# Patient Record
Sex: FEMALE | Race: WHITE | NOT HISPANIC OR LATINO | Employment: OTHER | URBAN - METROPOLITAN AREA
[De-identification: names, ages, dates, MRNs, and addresses within clinical notes are randomized per-mention and may not be internally consistent; named-entity substitution may affect disease eponyms.]

---

## 2018-03-30 ENCOUNTER — APPOINTMENT (OUTPATIENT)
Dept: CT IMAGING | Facility: HOSPITAL | Age: 63
End: 2018-03-30

## 2018-03-30 ENCOUNTER — HOSPITAL ENCOUNTER (EMERGENCY)
Facility: HOSPITAL | Age: 63
Discharge: HOME | End: 2018-03-30
Attending: EMERGENCY MEDICINE | Admitting: EMERGENCY MEDICINE

## 2018-03-30 ENCOUNTER — APPOINTMENT (OUTPATIENT)
Dept: ULTRASOUND IMAGING | Facility: HOSPITAL | Age: 63
End: 2018-03-30

## 2018-03-30 VITALS
TEMPERATURE: 98.6 F | RESPIRATION RATE: 18 BRPM | DIASTOLIC BLOOD PRESSURE: 78 MMHG | BODY MASS INDEX: 16.22 KG/M2 | WEIGHT: 95 LBS | HEIGHT: 64 IN | HEART RATE: 91 BPM | OXYGEN SATURATION: 97 % | SYSTOLIC BLOOD PRESSURE: 143 MMHG

## 2018-03-30 DIAGNOSIS — R10.11 RIGHT UPPER QUADRANT ABDOMINAL PAIN: Primary | ICD-10-CM

## 2018-03-30 DIAGNOSIS — E27.8 ADRENAL MASS, LEFT (HCC): ICD-10-CM

## 2018-03-30 LAB
ALBUMIN SERPL-MCNC: 3.9 G/DL (ref 3.5–5.2)
ALBUMIN/GLOB SERPL: 1.2 G/DL
ALP SERPL-CCNC: 72 U/L (ref 39–117)
ALT SERPL W P-5'-P-CCNC: 13 U/L (ref 1–33)
ANION GAP SERPL CALCULATED.3IONS-SCNC: 10 MMOL/L
AST SERPL-CCNC: 22 U/L (ref 1–32)
BASOPHILS # BLD AUTO: 0.02 10*3/MM3 (ref 0–0.2)
BASOPHILS NFR BLD AUTO: 0.3 % (ref 0–1.5)
BILIRUB SERPL-MCNC: 0.2 MG/DL (ref 0.1–1.2)
BILIRUB UR QL STRIP: NEGATIVE
BUN BLD-MCNC: 15 MG/DL (ref 8–23)
BUN/CREAT SERPL: 30 (ref 7–25)
CALCIUM SPEC-SCNC: 9.9 MG/DL (ref 8.6–10.5)
CHLORIDE SERPL-SCNC: 98 MMOL/L (ref 98–107)
CLARITY UR: CLEAR
CO2 SERPL-SCNC: 29 MMOL/L (ref 22–29)
COLOR UR: YELLOW
CREAT BLD-MCNC: 0.5 MG/DL (ref 0.57–1)
DEPRECATED RDW RBC AUTO: 46.6 FL (ref 37–54)
EOSINOPHIL # BLD AUTO: 0.03 10*3/MM3 (ref 0–0.7)
EOSINOPHIL NFR BLD AUTO: 0.4 % (ref 0.3–6.2)
ERYTHROCYTE [DISTWIDTH] IN BLOOD BY AUTOMATED COUNT: 12.8 % (ref 11.7–13)
GFR SERPL CREATININE-BSD FRML MDRD: 124 ML/MIN/1.73
GLOBULIN UR ELPH-MCNC: 3.3 GM/DL
GLUCOSE BLD-MCNC: 100 MG/DL (ref 65–99)
GLUCOSE UR STRIP-MCNC: NEGATIVE MG/DL
HCT VFR BLD AUTO: 42.6 % (ref 35.6–45.5)
HGB BLD-MCNC: 14 G/DL (ref 11.9–15.5)
HGB UR QL STRIP.AUTO: NEGATIVE
HOLD SPECIMEN: NORMAL
HOLD SPECIMEN: NORMAL
IMM GRANULOCYTES # BLD: 0 10*3/MM3 (ref 0–0.03)
IMM GRANULOCYTES NFR BLD: 0 % (ref 0–0.5)
KETONES UR QL STRIP: ABNORMAL
LEUKOCYTE ESTERASE UR QL STRIP.AUTO: NEGATIVE
LIPASE SERPL-CCNC: 23 U/L (ref 13–60)
LYMPHOCYTES # BLD AUTO: 1.33 10*3/MM3 (ref 0.9–4.8)
LYMPHOCYTES NFR BLD AUTO: 17 % (ref 19.6–45.3)
MCH RBC QN AUTO: 32.8 PG (ref 26.9–32)
MCHC RBC AUTO-ENTMCNC: 32.9 G/DL (ref 32.4–36.3)
MCV RBC AUTO: 99.8 FL (ref 80.5–98.2)
MONOCYTES # BLD AUTO: 0.93 10*3/MM3 (ref 0.2–1.2)
MONOCYTES NFR BLD AUTO: 11.9 % (ref 5–12)
NEUTROPHILS # BLD AUTO: 5.53 10*3/MM3 (ref 1.9–8.1)
NEUTROPHILS NFR BLD AUTO: 70.4 % (ref 42.7–76)
NITRITE UR QL STRIP: NEGATIVE
PH UR STRIP.AUTO: 6 [PH] (ref 5–8)
PLATELET # BLD AUTO: 310 10*3/MM3 (ref 140–500)
PMV BLD AUTO: 10.8 FL (ref 6–12)
POTASSIUM BLD-SCNC: 3.9 MMOL/L (ref 3.5–5.2)
PROT SERPL-MCNC: 7.2 G/DL (ref 6–8.5)
PROT UR QL STRIP: NEGATIVE
RBC # BLD AUTO: 4.27 10*6/MM3 (ref 3.9–5.2)
SODIUM BLD-SCNC: 137 MMOL/L (ref 136–145)
SP GR UR STRIP: 1.02 (ref 1–1.03)
UROBILINOGEN UR QL STRIP: ABNORMAL
WBC NRBC COR # BLD: 7.84 10*3/MM3 (ref 4.5–10.7)
WHOLE BLOOD HOLD SPECIMEN: NORMAL
WHOLE BLOOD HOLD SPECIMEN: NORMAL

## 2018-03-30 PROCEDURE — 36415 COLL VENOUS BLD VENIPUNCTURE: CPT

## 2018-03-30 PROCEDURE — 80053 COMPREHEN METABOLIC PANEL: CPT

## 2018-03-30 PROCEDURE — 81003 URINALYSIS AUTO W/O SCOPE: CPT | Performed by: EMERGENCY MEDICINE

## 2018-03-30 PROCEDURE — 85025 COMPLETE CBC W/AUTO DIFF WBC: CPT

## 2018-03-30 PROCEDURE — 76705 ECHO EXAM OF ABDOMEN: CPT

## 2018-03-30 PROCEDURE — 74176 CT ABD & PELVIS W/O CONTRAST: CPT

## 2018-03-30 PROCEDURE — 99283 EMERGENCY DEPT VISIT LOW MDM: CPT

## 2018-03-30 PROCEDURE — 83690 ASSAY OF LIPASE: CPT

## 2018-03-30 RX ORDER — SODIUM CHLORIDE 0.9 % (FLUSH) 0.9 %
10 SYRINGE (ML) INJECTION AS NEEDED
Status: DISCONTINUED | OUTPATIENT
Start: 2018-03-30 | End: 2018-03-30 | Stop reason: HOSPADM

## 2018-03-30 RX ORDER — OMEPRAZOLE 40 MG/1
40 CAPSULE, DELAYED RELEASE ORAL 2 TIMES DAILY
Qty: 60 CAPSULE | Refills: 0 | Status: SHIPPED | OUTPATIENT
Start: 2018-03-30 | End: 2018-04-23 | Stop reason: SINTOL

## 2018-04-05 ENCOUNTER — TELEPHONE (OUTPATIENT)
Dept: FAMILY MEDICINE CLINIC | Facility: CLINIC | Age: 63
End: 2018-04-05

## 2018-04-05 DIAGNOSIS — E27.8 ADRENAL MASS, LEFT (HCC): Primary | ICD-10-CM

## 2018-04-05 DIAGNOSIS — Z12.39 SCREENING FOR BREAST CANCER: ICD-10-CM

## 2018-04-05 NOTE — TELEPHONE ENCOUNTER
Patient called and spoke with the  wanting someone to let her know the results of her CT scan.  Patient's call returned and let her know there was a mass on her adrenal gland that we would like to refer her to Endo for. She understands and accepts.

## 2018-04-13 ENCOUNTER — TELEPHONE (OUTPATIENT)
Dept: FAMILY MEDICINE CLINIC | Facility: CLINIC | Age: 63
End: 2018-04-13

## 2018-04-13 NOTE — TELEPHONE ENCOUNTER
"Patient left  asking for a call back because \"there is a test she thinks should be ordered from her ER visit last month.\"  "

## 2018-04-16 NOTE — TELEPHONE ENCOUNTER
Called and spoke with patient to let her know we cannot order any testing until we see her in office. If the pain gets worse she needs to go back to the ER.

## 2018-04-16 NOTE — TELEPHONE ENCOUNTER
Please call pt back and let her know that we can discuss her abdominal pain further on May 1st at her appointment.  If the pain is severe or if she cannot PO intake, then she should return to the ER.

## 2018-04-16 NOTE — TELEPHONE ENCOUNTER
Patient left a second VM this morning stating that she is still having gallbladder pain and would like the further imaging suggested from the ultrasound of her gallbladder from her ER visit.     No evidence for acute cholecystitis. With persistent clinical indication, hepatobiliary scintigraphy and/or CT scan could be considered for further evaluation.

## 2018-04-19 ENCOUNTER — HOSPITAL ENCOUNTER (OUTPATIENT)
Dept: MAMMOGRAPHY | Facility: HOSPITAL | Age: 63
Discharge: HOME | End: 2018-04-19
Admitting: FAMILY MEDICINE

## 2018-04-19 DIAGNOSIS — Z12.39 SCREENING FOR BREAST CANCER: ICD-10-CM

## 2018-04-19 PROCEDURE — 77067 SCR MAMMO BI INCL CAD: CPT

## 2018-04-23 ENCOUNTER — OFFICE VISIT (OUTPATIENT)
Dept: FAMILY MEDICINE CLINIC | Facility: CLINIC | Age: 63
End: 2018-04-23

## 2018-04-23 VITALS
RESPIRATION RATE: 13 BRPM | WEIGHT: 89 LBS | HEIGHT: 64 IN | HEART RATE: 77 BPM | SYSTOLIC BLOOD PRESSURE: 124 MMHG | BODY MASS INDEX: 15.19 KG/M2 | DIASTOLIC BLOOD PRESSURE: 86 MMHG | OXYGEN SATURATION: 98 %

## 2018-04-23 DIAGNOSIS — N85.8 UTERINE MASS: ICD-10-CM

## 2018-04-23 DIAGNOSIS — R10.11 RUQ PAIN: ICD-10-CM

## 2018-04-23 DIAGNOSIS — N28.1 RENAL CYST: ICD-10-CM

## 2018-04-23 DIAGNOSIS — R53.83 FATIGUE, UNSPECIFIED TYPE: ICD-10-CM

## 2018-04-23 DIAGNOSIS — R59.0 LEFT CERVICAL LYMPHADENOPATHY: Primary | ICD-10-CM

## 2018-04-23 DIAGNOSIS — R63.4 WEIGHT LOSS: ICD-10-CM

## 2018-04-23 PROBLEM — L71.9 ROSACEA: Status: ACTIVE | Noted: 2018-04-23

## 2018-04-23 PROBLEM — I34.1 PROLAPSE OF MITRAL VALVE: Status: ACTIVE | Noted: 2018-04-23

## 2018-04-23 PROCEDURE — 99215 OFFICE O/P EST HI 40 MIN: CPT | Performed by: FAMILY MEDICINE

## 2018-04-23 RX ORDER — DOXYCYCLINE HYCLATE 100 MG/1
100 CAPSULE ORAL AS NEEDED
COMMUNITY
End: 2018-05-17

## 2018-04-23 NOTE — PROGRESS NOTES
Subjective   Sujata Waters is a 62 y.o. female.     Chief Complaint   Patient presents with   • Establish Care   • Adenopathy   • Weight Loss   • Abdominal/flank pain   • ER follow up       HPI     ER follow up:  Pt went to [facility] ER on 3/30/18 for R flank pain  -ER labs reviewed and essentially WNL, notable only for: mild macrocytosis w/o anemia, BUN/Cr ratio of 30, serum glucose 100, and ketones in urine  -CT abdomen and pelvis dated 3/30/18 revealed:   1. 21 mm left adrenal mass with average density measurement of 40,  indeterminate.  2. 1.5 mm nonobstructing right posterior middle pole kidney stone  appears be associated with a low attenuating right renal mass 12 mm  question benign cyst, no obstruction.  3. Left kidney appears normal, no obstruction nor calcification.  4. Significant vascular calcification without aneurysm. There are 5  normal bowel gas gas pattern no obstruction, free air nor dilatation of  loops.  6. Apparent exophytic mass off the uterus with coarse calcifications  likely fibroid measuring up to 36 mm, no free fluid  -she was discharged home with omeprazole but she did not tolerate it due to palpitations   -of note pt has a hx of  mitral valve prolapse diagnosed in the 1980s and she does not follow regularly with a Cardiologist any more  -palpitations resolved with discontinuation of PPI  -today she is still having intermittent RUQ and R flank pain radiating to the R upper back  -no vomiting  -no apparent food triggers  -bowel movements are normal  -she has lost about 6 lb  -baseline weight 100-105 lb, but she is down to 89 lb today  -diet consists of a lot of fish and rice  -she has been trying to eat high-calorie nutritious snacks like dried fruit and peanut butter, but the weight loss continues  -on 4/5/18 I referred her to Endocrinology to follow up the adrenal mass discovered on CT scan and the appt is scheduled for  6/11/18    Swollen lymph node:  -in left side of  neck  -painless  -present x at least 2 weeks   -she does not think it has grown  -her dentist noticed the enlarged node and advised her to seek care last week  -pt reports a hx of basal cell carcinoma s/p resection but no other cancers    It has been several years since she last saw her previous PCP Dr. Karyn Snow      Review of Systems   Constitutional: Positive for fatigue and unexpected weight change (losing weight despite eating normally). Negative for activity change, appetite change, diaphoresis (no night sweats) and fever.   HENT: Negative for congestion, ear pain, rhinorrhea, sinus pain, sinus pressure and sore throat.    Respiratory: Negative for cough and shortness of breath.    Cardiovascular: Positive for palpitations (intermittently for years). Negative for chest pain.   Gastrointestinal: Positive for abdominal pain and nausea. Negative for abdominal distention, blood in stool, constipation, diarrhea and vomiting.   Genitourinary: Negative for dysuria and hematuria.   Skin: Negative for rash and wound.   Neurological: Negative for dizziness and headaches.   Hematological: Positive for adenopathy. Does not bruise/bleed easily.       The following portions of the patient's history were reviewed and updated as appropriate: allergies, current medications, past family history, past medical history, past social history, past surgical history and problem list.    Past Medical History:   Diagnosis Date   • Atherosclerosis of abdominal aorta    • Basal cell carcinoma     Follows up with Dermatology annually, PA with Dr. Antoine's office   • Bowen's disease of vulva    • Left adrenal mass    • Mitral valve prolapse    • Right renal mass    • Rosacea    • Uterine mass      Past Surgical History:   Procedure Laterality Date   • BREAST AUGMENTATION     • EYE SURGERY     • RETINAL LASER PROCEDURE     • VULVECTOMY COMPLETE / RADICAL / PARTIAL      partial, due to Bowen's Disease       Family History   Problem Relation  Age of Onset   • Heart disease Father    • Thyroid cancer Sister    • No Known Problems Brother      Social History     Social History   • Marital status:      Social History Main Topics   • Smoking status: Current Every Day Smoker     Packs/day: 1.50     Types: Cigarettes   • Smokeless tobacco: Never Used      Comment: started smoking at age 16   • Alcohol use Yes      Comment: 2 glasses of wine a few nights per week   • Drug use: No     Social History Narrative    Works as a .  Lives at home with her daughter.          No Known Allergies     Outpatient Medications Prior to Visit   Medication Sig Dispense Refill   • Loratadine (CLARITIN CHILDRENS PO) Take  by mouth.           Objective     Vitals:    04/23/18 1404   BP: 124/86   Pulse: 77   Resp: 13   SpO2: 98%   BMI 15.3    Physical Exam   Constitutional: No distress.   Cachetic appearing adult woman in no acute distress   HENT:   Head: Normocephalic and atraumatic.   Nose: Nose normal.   Mouth/Throat: Oropharynx is clear and moist.   Eyes: Conjunctivae are normal. Pupils are equal, round, and reactive to light.   Neck: No thyromegaly present.   Cardiovascular: Normal rate, regular rhythm and normal heart sounds.  Exam reveals no gallop and no friction rub.    No murmur heard.  Pulmonary/Chest: Effort normal and breath sounds normal. No respiratory distress. She has no wheezes. She has no rhonchi. She has no rales.   Abdominal: Soft. Bowel sounds are normal. She exhibits no distension and no mass. There is no tenderness. There is no rebound and no guarding.   Lymphadenopathy:     She has cervical adenopathy (enlarged, firm, fixed post-auricular lymph node on the L side).   Skin: Skin is warm and dry.   Psychiatric: She has a normal mood and affect. Her behavior is normal.       ASSESSMENT/PLAN          Visit Diagnoses     Left cervical lymphadenopathy    physical exam concerning for possible malignancy    Relevant Orders    Urgent Ambulatory  Referral to ENT (Otolaryngology) for biopsy    CBC & Differential (Completed)    Comprehensive Metabolic Panel (Completed)    TSH (Completed)    Weight loss   ddx is broad and includes malignancy (particulalry lymphoma given enlarged lymph node), gallbladder disease, thyroid disease, and anxiety    Relevant Orders    Ambulatory Referral to ENT (Otolaryngology)    CBC & Differential (Completed)    Comprehensive Metabolic Panel (Completed)    TSH (Completed)    US Abdomen Complete (Completed)  Pt given sample bottles of Ensure nutritional supplement to increase caloric intake.        RUQ pain        Relevant Orders    Comprehensive Metabolic Panel (Completed)    US Abdomen Complete (Completed)  Consider HIDA scan and/or referral to GI pending results        Fatigue, unspecified type        Relevant Orders    Ambulatory Referral to ENT (Otolaryngology)    CBC & Differential (Completed)    Comprehensive Metabolic Panel (Completed)    TSH (Completed)    Renal cyst        Relevant Orders    US Abdomen Complete (Completed)  Consider referral to Urology pending results      Uterine mass   Consistent with fibroid on initial CT scan, pt denies pelvic complaints at this time     Relevant Orders    US Abdomen Complete (Completed)  Consider referral to Gynecology pending results          Available records from pt's OB/GYN and previous PCP reviewed via Care Everywhere and chart updated    Patient Instructions   Please try to drink 2 Ensure drinks daily in addition to your usual 3 meals per day.      High-Protein and High-Calorie Diet  Eating high-protein and high-calorie foods can help you to gain weight, heal after an injury, and recover after an illness or surgery.  What is my plan?  The specific amount of daily protein and calories you need depends on:  · Your body weight.  · The reason this diet is recommended for you.  Generally, a high-protein, high-calorie diet involves:  · Eating 250-500 extra calories each day.  · Making  sure that 10-35% of your daily calories come from protein.  Talk to your health care provider about how much protein and how many calories you need each day. Follow the diet as directed by your health care provider.  What do I need to know about this diet?  · Ask your health care provider if you should take a nutritional supplement.  · Try to eat six small meals each day instead of three large meals.  · Eat a balanced diet, including one food that is high in protein at each meal.  · Keep nutritious snacks handy, such as nuts, trail mixes, dried fruit, and yogurt.  · If you have kidney disease or diabetes, eating too much protein may put extra stress on your kidneys. Talk to your health care provider if you have either of those conditions.  What are some high-protein foods?  Grains   Quinoa. Bulgur wheat.  Vegetables   Soybeans. Peas.  Meats and Other Protein Sources   Beef, pork, and poultry. Fish and seafood. Eggs. Tofu. Textured vegetable protein (TVP). Peanut butter. Nuts and seeds. Dried beans. Protein powders.  Dairy   Whole milk. Whole-milk yogurt. Powdered milk. Cheese. Cottage Cheese. Eggnog.  Beverages   High-protein supplement drinks. Soy milk.  Other   Protein bars.  The items listed above may not be a complete list of recommended foods or beverages. Contact your dietitian for more options.   What are some high-calorie foods?  Grains   Pasta. Quick breads. Muffins. Pancakes. Ready-to-eat cereal.  Vegetables   Vegetables cooked in oil or butter. Fried potatoes.  Fruits   Dried fruit. Fruit leather. Canned fruit in syrup. Fruit juice. Avocados.  Meats and Other Protein Sources   Peanut butter. Nuts and seeds.  Dairy   Heavy cream. Whipped cream. Cream cheese. Sour cream. Ice cream. Custard. Pudding.  Beverages   Meal-replacement beverages. Nutrition shakes. Fruit juice. Sugar-sweetened soft drinks.  Condiments   Salad dressing. Mayonnaise. Ramesh sauce. Fruit preserves or jelly. Honey.  Syrup.  Sweets/Desserts   Cake. Cookies. Pie. Pastries. Candy bars. Chocolate.  Fats and Oils   Butter or margarine. Oil. Gravy.  Other   Meal-replacement bars.  The items listed above may not be a complete list of recommended foods or beverages. Contact your dietitian for more options.   What are some tips for including high-protein and high-calorie foods in my diet?  · Add whole milk, half-and-half, or heavy cream to cereal, pudding, soup, or hot cocoa.  · Add whole milk to instant breakfast drinks.  · Add peanut butter to oatmeal or smoothies.  · Add powdered milk to baked goods, smoothies, or milkshakes.  · Add powdered milk, cream, or butter to mashed potatoes.  · Add cheese to cooked vegetables.  · Make whole-milk yogurt parfaits. Top them with granola, fruit, or nuts.  · Add cottage cheese to your fruit.  · Add avocados, cheese, or both to sandwiches or salads.  · Add meat, poultry, or seafood to rice, pasta, casseroles, salads, and soups.  · Use mayonnaise when making egg salad, chicken salad, or tuna salad.  · Use peanut butter as a topping for pretzels, celery, or crackers.  · Add beans to casseroles, dips, and spreads.  · Add pureed beans to sauces and soups.  · Replace calorie-free drinks with calorie-containing drinks, such as milk and fruit juice.  This information is not intended to replace advice given to you by your health care provider. Make sure you discuss any questions you have with your health care provider.  Document Released: 12/18/2006 Document Revised: 05/25/2017 Document Reviewed: 06/02/2015  [employer] Interactive Patient Education © 2017 [employer]      Return in about 1 week (around 4/30/2018). for close follow up      Bernie Francois MD  04/24/18

## 2018-04-23 NOTE — PATIENT INSTRUCTIONS
Please try to drink 2 Ensure drinks daily in addition to your usual 3 meals per day.      High-Protein and High-Calorie Diet  Eating high-protein and high-calorie foods can help you to gain weight, heal after an injury, and recover after an illness or surgery.  What is my plan?  The specific amount of daily protein and calories you need depends on:  · Your body weight.  · The reason this diet is recommended for you.  Generally, a high-protein, high-calorie diet involves:  · Eating 250-500 extra calories each day.  · Making sure that 10-35% of your daily calories come from protein.  Talk to your health care provider about how much protein and how many calories you need each day. Follow the diet as directed by your health care provider.  What do I need to know about this diet?  · Ask your health care provider if you should take a nutritional supplement.  · Try to eat six small meals each day instead of three large meals.  · Eat a balanced diet, including one food that is high in protein at each meal.  · Keep nutritious snacks handy, such as nuts, trail mixes, dried fruit, and yogurt.  · If you have kidney disease or diabetes, eating too much protein may put extra stress on your kidneys. Talk to your health care provider if you have either of those conditions.  What are some high-protein foods?  Grains   Quinoa. Bulgur wheat.  Vegetables   Soybeans. Peas.  Meats and Other Protein Sources   Beef, pork, and poultry. Fish and seafood. Eggs. Tofu. Textured vegetable protein (TVP). Peanut butter. Nuts and seeds. Dried beans. Protein powders.  Dairy   Whole milk. Whole-milk yogurt. Powdered milk. Cheese. Cottage Cheese. Eggnog.  Beverages   High-protein supplement drinks. Soy milk.  Other   Protein bars.  The items listed above may not be a complete list of recommended foods or beverages. Contact your dietitian for more options.   What are some high-calorie foods?  Grains   Pasta. Quick breads. Muffins. Pancakes.  Ready-to-eat cereal.  Vegetables   Vegetables cooked in oil or butter. Fried potatoes.  Fruits   Dried fruit. Fruit leather. Canned fruit in syrup. Fruit juice. Avocados.  Meats and Other Protein Sources   Peanut butter. Nuts and seeds.  Dairy   Heavy cream. Whipped cream. Cream cheese. Sour cream. Ice cream. Custard. Pudding.  Beverages   Meal-replacement beverages. Nutrition shakes. Fruit juice. Sugar-sweetened soft drinks.  Condiments   Salad dressing. Mayonnaise. Ramesh sauce. Fruit preserves or jelly. Honey. Syrup.  Sweets/Desserts   Cake. Cookies. Pie. Pastries. Candy bars. Chocolate.  Fats and Oils   Butter or margarine. Oil. Gravy.  Other   Meal-replacement bars.  The items listed above may not be a complete list of recommended foods or beverages. Contact your dietitian for more options.   What are some tips for including high-protein and high-calorie foods in my diet?  · Add whole milk, half-and-half, or heavy cream to cereal, pudding, soup, or hot cocoa.  · Add whole milk to instant breakfast drinks.  · Add peanut butter to oatmeal or smoothies.  · Add powdered milk to baked goods, smoothies, or milkshakes.  · Add powdered milk, cream, or butter to mashed potatoes.  · Add cheese to cooked vegetables.  · Make whole-milk yogurt parfaits. Top them with granola, fruit, or nuts.  · Add cottage cheese to your fruit.  · Add avocados, cheese, or both to sandwiches or salads.  · Add meat, poultry, or seafood to rice, pasta, casseroles, salads, and soups.  · Use mayonnaise when making egg salad, chicken salad, or tuna salad.  · Use peanut butter as a topping for pretzels, celery, or crackers.  · Add beans to casseroles, dips, and spreads.  · Add pureed beans to sauces and soups.  · Replace calorie-free drinks with calorie-containing drinks, such as milk and fruit juice.  This information is not intended to replace advice given to you by your health care provider. Make sure you discuss any questions you have with  your health care provider.  Document Released: 12/18/2006 Document Revised: 05/25/2017 Document Reviewed: 06/02/2015  [employer] Interactive Patient Education © 2017 [employer]

## 2018-04-24 ENCOUNTER — HOSPITAL ENCOUNTER (OUTPATIENT)
Dept: ULTRASOUND IMAGING | Facility: HOSPITAL | Age: 63
Discharge: HOME | End: 2018-04-24
Admitting: FAMILY MEDICINE

## 2018-04-24 DIAGNOSIS — N28.1 RENAL CYST: ICD-10-CM

## 2018-04-24 DIAGNOSIS — R10.11 RUQ PAIN: ICD-10-CM

## 2018-04-24 DIAGNOSIS — R63.4 WEIGHT LOSS: ICD-10-CM

## 2018-04-24 DIAGNOSIS — N85.8 UTERINE MASS: ICD-10-CM

## 2018-04-24 PROBLEM — R59.0 LEFT CERVICAL LYMPHADENOPATHY: Status: ACTIVE | Noted: 2018-04-24

## 2018-04-24 LAB
ALBUMIN SERPL-MCNC: 4.3 G/DL (ref 3.6–4.8)
ALBUMIN/GLOB SERPL: 1.4 {RATIO} (ref 1.2–2.2)
ALP SERPL-CCNC: 78 IU/L (ref 39–117)
ALT SERPL-CCNC: 13 IU/L (ref 0–32)
AST SERPL-CCNC: 28 IU/L (ref 0–40)
BASOPHILS # BLD AUTO: 0 X10E3/UL (ref 0–0.2)
BASOPHILS NFR BLD AUTO: 0 %
BILIRUB SERPL-MCNC: 0.4 MG/DL (ref 0–1.2)
BUN SERPL-MCNC: 9 MG/DL (ref 8–27)
BUN/CREAT SERPL: 19 (ref 12–28)
CALCIUM SERPL-MCNC: 9.7 MG/DL (ref 8.7–10.3)
CHLORIDE SERPL-SCNC: 93 MMOL/L (ref 96–106)
CO2 SERPL-SCNC: 24 MMOL/L (ref 18–29)
CREAT SERPL-MCNC: 0.47 MG/DL (ref 0.57–1)
EOSINOPHIL # BLD AUTO: 0 X10E3/UL (ref 0–0.4)
EOSINOPHIL NFR BLD AUTO: 0 %
ERYTHROCYTE [DISTWIDTH] IN BLOOD BY AUTOMATED COUNT: 13.6 % (ref 12.3–15.4)
GFR SERPLBLD CREATININE-BSD FMLA CKD-EPI: 105 ML/MIN/1.73
GFR SERPLBLD CREATININE-BSD FMLA CKD-EPI: 121 ML/MIN/1.73
GLOBULIN SER CALC-MCNC: 3 G/DL (ref 1.5–4.5)
GLUCOSE SERPL-MCNC: 94 MG/DL (ref 65–99)
HCT VFR BLD AUTO: 42.8 % (ref 34–46.6)
HGB BLD-MCNC: 14.7 G/DL (ref 11.1–15.9)
IMM GRANULOCYTES # BLD: 0 X10E3/UL (ref 0–0.1)
IMM GRANULOCYTES NFR BLD: 0 %
LYMPHOCYTES # BLD AUTO: 1.3 X10E3/UL (ref 0.7–3.1)
LYMPHOCYTES NFR BLD AUTO: 18 %
MCH RBC QN AUTO: 33 PG (ref 26.6–33)
MCHC RBC AUTO-ENTMCNC: 34.3 G/DL (ref 31.5–35.7)
MCV RBC AUTO: 96 FL (ref 79–97)
MONOCYTES # BLD AUTO: 1 X10E3/UL (ref 0.1–0.9)
MONOCYTES NFR BLD AUTO: 13 %
NEUTROPHILS # BLD AUTO: 5 X10E3/UL (ref 1.4–7)
NEUTROPHILS NFR BLD AUTO: 69 %
PLATELET # BLD AUTO: 311 X10E3/UL (ref 150–379)
POTASSIUM SERPL-SCNC: 4.5 MMOL/L (ref 3.5–5.2)
PROT SERPL-MCNC: 7.3 G/DL (ref 6–8.5)
RBC # BLD AUTO: 4.45 X10E6/UL (ref 3.77–5.28)
SODIUM SERPL-SCNC: 135 MMOL/L (ref 134–144)
TSH SERPL DL<=0.005 MIU/L-ACNC: 1.02 UIU/ML (ref 0.45–4.5)
WBC # BLD AUTO: 7.3 X10E3/UL (ref 3.4–10.8)

## 2018-04-24 PROCEDURE — 76700 US EXAM ABDOM COMPLETE: CPT

## 2018-04-26 DIAGNOSIS — Z72.0 TOBACCO ABUSE: ICD-10-CM

## 2018-04-26 DIAGNOSIS — R59.1 LYMPHADENOPATHY: ICD-10-CM

## 2018-04-26 DIAGNOSIS — R63.4 WEIGHT LOSS: Primary | ICD-10-CM

## 2018-04-26 NOTE — PROGRESS NOTES
Patient called and informed of indeterminate right renal and left adrenal lesions essentially unchanged compared to previous CT abdomen and pelvis.  Will defer follow-up CT abdomen renal protocol until after lymph node biopsy and CT chest as there is high concern for possible malignancy and these studies take precedence at this time.  Stat order for CT chest with contrast placed.  Lymph node biopsy scheduled in 2 weeks time on May 10, 2018.  Patient reports that the physician's assistant at the ENT office whom she saw today was able to perform flexible laryngoscopy in the office which was normal.

## 2018-05-01 ENCOUNTER — HOSPITAL ENCOUNTER (OUTPATIENT)
Dept: CT IMAGING | Facility: HOSPITAL | Age: 63
Discharge: HOME | End: 2018-05-01
Admitting: FAMILY MEDICINE

## 2018-05-01 DIAGNOSIS — R63.4 WEIGHT LOSS: ICD-10-CM

## 2018-05-01 DIAGNOSIS — R59.1 LYMPHADENOPATHY: ICD-10-CM

## 2018-05-01 DIAGNOSIS — Z72.0 TOBACCO ABUSE: ICD-10-CM

## 2018-05-01 LAB — CREAT BLDA-MCNC: 0.4 MG/DL (ref 0.6–1.3)

## 2018-05-01 PROCEDURE — 82565 ASSAY OF CREATININE: CPT

## 2018-05-01 PROCEDURE — 71260 CT THORAX DX C+: CPT

## 2018-05-01 PROCEDURE — 25010000002 IOPAMIDOL 61 % SOLUTION: Performed by: FAMILY MEDICINE

## 2018-05-01 RX ADMIN — IOPAMIDOL 75 ML: 612 INJECTION, SOLUTION INTRAVENOUS at 10:03

## 2018-05-02 DIAGNOSIS — R91.8 MASS OF RIGHT LUNG: Primary | ICD-10-CM

## 2018-05-09 ENCOUNTER — OFFICE VISIT (OUTPATIENT)
Dept: OTHER | Facility: HOSPITAL | Age: 63
End: 2018-05-09
Attending: INTERNAL MEDICINE

## 2018-05-09 VITALS
TEMPERATURE: 97.8 F | RESPIRATION RATE: 16 BRPM | HEIGHT: 64 IN | OXYGEN SATURATION: 96 % | SYSTOLIC BLOOD PRESSURE: 152 MMHG | DIASTOLIC BLOOD PRESSURE: 71 MMHG | WEIGHT: 87.6 LBS | HEART RATE: 73 BPM | BODY MASS INDEX: 14.95 KG/M2

## 2018-05-09 DIAGNOSIS — C79.9 METASTATIC CANCER (HCC): Primary | ICD-10-CM

## 2018-05-09 DIAGNOSIS — C34.90 MALIGNANT NEOPLASM OF LUNG, UNSPECIFIED LATERALITY, UNSPECIFIED PART OF LUNG (HCC): ICD-10-CM

## 2018-05-09 DIAGNOSIS — I34.1 PROLAPSE OF MITRAL VALVE: ICD-10-CM

## 2018-05-09 DIAGNOSIS — R59.0 ENLARGED LYMPH NODE IN NECK: ICD-10-CM

## 2018-05-09 DIAGNOSIS — B02.29 POSTHERPETIC NEURALGIA: ICD-10-CM

## 2018-05-09 PROCEDURE — 99205 OFFICE O/P NEW HI 60 MIN: CPT | Performed by: INTERNAL MEDICINE

## 2018-05-09 PROCEDURE — G0463 HOSPITAL OUTPT CLINIC VISIT: HCPCS | Performed by: INTERNAL MEDICINE

## 2018-05-09 RX ORDER — LORAZEPAM 1 MG/1
1 TABLET ORAL ONCE
Qty: 1 TABLET | Refills: 0 | Status: SHIPPED | OUTPATIENT
Start: 2018-05-09 | End: 2018-05-09

## 2018-05-09 NOTE — PROGRESS NOTES
Subjective     REASON FOR CONSULTATION:  Metastatic cancer likely lung primary  Provide an opinion on any further workup or treatment                             REQUESTING PHYSICIAN:  Dr. Kevin Heath    RECORDS OBTAINED:  Records of the patients history including those obtained from the referring provider were reviewed and summarized in detail.    HISTORY OF PRESENT ILLNESS:  The patient is a 62 y.o. year old female who is here for an opinion about the above issue.    History of Present Illness patient is a 62-year-old female who had gone to the emergency room at [facility] on March 30, 2018 with intermittent severe sharp right flank pain and right back pain which started a week prior.  She also states that it worsens in the night and it lasted 20-30 minutes and was intermittent.  She had pain after eating.  In the emergency room they did a CT of the abdomen pelvis which showed a 21 mm left adrenal mass.  A 1.5 cm nonobstructing right kidney stone.  Ultrasound of the gallbladder was done which showed no evidence of acute cholecystitis.    She also had a recent mammogram April 19, 2018 which was negative.  She came in with continued pain and went to the primary care physician.  She had a repeat abdominal ultrasound on April 23, 2018.  This showed that the liver and pancreas appeared normal the gallbladder appeared normal the spleen was normal.  She had calcifications in the [employer].  There is a 15 mm focus hypoechoic lesion in the right mid pole of the kidney.  Adjacent to the upper pole of the left kidney there is a solid appearing mass which is 28 mm.  This corresponds to the adrenal lesion which was seen on the CT scan previously.    Patient  had a CT scan of the chest with contrast on May 2, 2018.  There is a large heterogeneous mass within the posterior  segment of the right upper lobe which measures approximately 8.8 x 5.6  cm and the craniocaudad span is approximately 6.6 cm. There is extension  into the  posterior chest wall at the posterior right 4th-5th intercostal  space and there is extension into the right T4-5 neural foramen. There  are multiple nodules surrounding the lesion in the right upper lobe.  There is also an irregular 4 x 3 cm mass at the anterior aspect of the  left upper lobe. In addition, there is an 8 mm irregular opacity in the  left apical region and a 6 mm pulmonary nodule inferiorly in the left  upper lobe. There are 2 irregular reticular opacities in the right lower  lobe which both measure approximately 1 cm. There are no pleural or  pericardial effusions. There is ill-defined lymphadenopathy at the right  hilum. There are moderately extensive underlying emphysematous changes.  In the visualized upper abdomen, there is a 3.0 x 2.2 cm left adrenal  nodule. There is a faintly hyperenhancing 7 mm lesion within the  anterior hepatic segment.    Patient continues to have right upper back pain.  She denies any bladder or bowel incontinence.  She denies any headache.  She does not have a tissue diagnosis.  She has also seen that she developed a left neck node which has increased within the last month.  She was referred to ENT Dr. Forbes.  The patient had an endoscopy which apparently was negative.  The neck node is reasonably large about 3 into 4 cm.  She will require core needle biopsy of the neck node which is easy access.  She did lose a lot of weight.        Past Medical History:   Diagnosis Date   • Atherosclerosis of abdominal aorta    • Basal cell carcinoma     Follows up with Dermatology annually, [place] with Dr. Antoine's office   • Bowen's disease of vulva    • Left adrenal mass    • Lung mass    • Lung mass     Bilateral   • Mitral valve prolapse    • Right renal mass    • Rosacea    • Shingles    • Uterine mass         Past Surgical History:   Procedure Laterality Date   • BREAST AUGMENTATION     • EYE SURGERY     • RETINAL LASER PROCEDURE     • VULVECTOMY COMPLETE / RADICAL / PARTIAL       "partial, due to Bowen's Disease        Current Outpatient Prescriptions on File Prior to Visit   Medication Sig Dispense Refill   • doxycycline (VIBRAMYCIN) 100 MG capsule Take 100 mg by mouth.     • Loratadine (CLARITIN CHILDRENS PO) Take  by mouth.       No current facility-administered medications on file prior to visit.         ALLERGIES:  No Known Allergies     Social History     Social History   • Marital status:      Social History Main Topics   • Smoking status: Current Every Day Smoker     Packs/day: 1.50     Types: Cigarettes   • Smokeless tobacco: Never Used      Comment: started smoking at age 16   • Alcohol use Yes      Comment: 2 glasses of wine a few nights per week   • Drug use: No   • Sexual activity: Defer     Other Topics Concern   • Not on file     Social History Narrative    Works as a .  Lives at home with her daughter.          Family History   Problem Relation Age of Onset   • Heart disease Father    • Thyroid cancer Sister    • No Known Problems Brother         Review of Systems   Constitutional: Positive for fatigue and unexpected weight change.   Respiratory: Negative for cough, chest tightness and shortness of breath.    Cardiovascular: Positive for chest pain. Negative for palpitations and leg swelling.   Gastrointestinal: Negative for abdominal pain, blood in stool, nausea and vomiting.   All other systems reviewed and are negative.       Objective     Vitals:    05/09/18 1024   BP: 152/71   Pulse: 73   Resp: 16   Temp: 97.8 °F (36.6 °C)   TempSrc: Oral   SpO2: 96%  Comment: room air   Weight: 39.7 kg (87 lb 9.6 oz)   Height: 162.6 cm (64.02\")     No flowsheet data found.    Physical Exam      GENERAL:  Well-developed, well-nourished in no acute distress.   SKIN:  Warm, dry without rashes, purpura or petechiae.  EYES:  Pupils equal, round and reactive to light.  EOMs intact.  Conjunctivae normal.  EARS:  Hearing intact.  NOSE:  Septum midline.  No excoriations or " nasal discharge.  MOUTH:  Tongue is well-papillated; no stomatitis or ulcers.  Lips normal.  THROAT:  Oropharynx without lesions or exudates.  NECK:  Supple with good range of motion; no thyromegaly or masses, no JVD.  LYMPHATICS:  No cervical, supraclavicular, axillary or inguinal adenopathy.  CHEST:  Lungs clear to auscultation. Good airflow.  CARDIAC:  Regular rate and rhythm without murmurs, rubs or gallops. Normal S1,S2.  ABDOMEN:  Soft, nontender with no hepatosplenomegaly or masses.  EXTREMITIES:  No clubbing, cyanosis or edema.  NEUROLOGICAL:  Cranial Nerves II-XII grossly intact.  No focal neurological deficits.  PSYCHIATRIC:  Normal affect and mood.          RECENT LABS:  Hematology WBC   Date Value Ref Range Status   04/23/2018 7.3 3.4 - 10.8 x10E3/uL Final   03/30/2018 7.84 4.50 - 10.70 10*3/mm3 Final     RBC   Date Value Ref Range Status   04/23/2018 4.45 3.77 - 5.28 x10E6/uL Final   03/30/2018 4.27 3.90 - 5.20 10*6/mm3 Final     Hemoglobin   Date Value Ref Range Status   04/23/2018 14.7 11.1 - 15.9 g/dL Final   03/30/2018 14.0 11.9 - 15.5 g/dL Final     Hematocrit   Date Value Ref Range Status   04/23/2018 42.8 34.0 - 46.6 % Final   03/30/2018 42.6 35.6 - 45.5 % Final     Platelets   Date Value Ref Range Status   04/23/2018 311 150 - 379 x10E3/uL Final   03/30/2018 310 140 - 500 10*3/mm3 Final          Assessment/Plan     1.  Newly diagnosed bilateral lung nodules, with a large 8 cm ×6 cm right upper lobe lung nodule with extension into the intercostal space between the fourth and fifth rib and also extending into the thoracic 4-5 neural foramina.  Patient has bilateral pulmonary nodular opacities which are suspicious for metastasis.  Patient also has a 3 cm left adrenal nodule suspicious for metastasis.  Further evaluation with a PET CT is recommended.  Patient has noticed a left neck nodule and is a highly high nodule which is growing very quickly in the last month.  Patient has seen ENT and  endoscopy was negative.  She does have a significant amount of pain on the right chest wall area.  She does not have a tissue diagnosis and will give 1.  She has distant metastases to the adrenal gland.  I reviewed both the CT scan and the PET scan personally with the patient    2.  Pain with tumor extending near the thoracic 4 and 5 neural canal as well as the intercostal spaces.  Patient will require a PET scan.  He'll also require an MRI of the thoracic spine.    3.  Anxiety and depression    Plan 1.  MRI of the brain    2.  MRI of the thoracic spine    3.  Will receive Ativan 1 mg prior to the MRI    4.  Follow-up extended week    5.  Ultrasound-guided neck node biopsy, we will obtain a core biopsy and needs to be sent to pathology as this is likely metastatic lung cancer.    6.  Will obtain CD X testing on the tissue    Leyla Canada MD        Cc: Dr. Heath

## 2018-05-09 NOTE — PROGRESS NOTES
Subjective   Patient ID: Sujata Waters is a 62 y.o. female {Specialty - why patient here?:[id number]}    History of Present Illness  Dear Colleague,  Sujata Waters was seen in our office today for continued follow up and surveillance  postoperative visit after surgery for ***.  She denies any complaints of fever, chills, cough, hemoptysis, pleuritic chest pain, shortness of air, dyspnea with exertion, night sweats, hoarseness, or unintentional weight loss. Underlying medical conditions including *** remain stable.  She has no other somatic complaints or alleviating or exacerbating factors aside from those mentioned above.    The following portions of the patient's history were reviewed and updated as appropriate: allergies, current medications, past family history, past medical history, past social history, past surgical history and problem list.  ROS  Patient Active Problem List   Diagnosis   • AR (allergic rhinitis)   • Postherpetic neuralgia   • Prolapse of mitral valve   • Rosacea   • Smoking addiction   • Left adrenal mass   • Right renal mass   • Uterine mass   • Basal cell carcinoma   • Shingles   • Left cervical lymphadenopathy   • Weight loss   • RUQ pain   • Renal cyst   • Lung mass     Past Medical History:   Diagnosis Date   • Atherosclerosis of abdominal aorta    • Basal cell carcinoma     Follows up with Dermatology annually, [place] with Dr. Antoine's office   • Bowen's disease of vulva    • Left adrenal mass    • Lung mass    • Mitral valve prolapse    • Right renal mass    • Rosacea    • Shingles    • Uterine mass      Past Surgical History:   Procedure Laterality Date   • BREAST AUGMENTATION     • EYE SURGERY     • RETINAL LASER PROCEDURE     • VULVECTOMY COMPLETE / RADICAL / PARTIAL      partial, due to Bowen's Disease     Family History   Problem Relation Age of Onset   • Heart disease Father    • Thyroid cancer Sister    • No Known Problems Brother      Social History     Social History   • Marital  status:      Spouse name: N/A   • Number of children: N/A   • Years of education: N/A     Occupational History   • Not on file.     Social History Main Topics   • Smoking status: Current Every Day Smoker     Packs/day: 1.50     Types: Cigarettes   • Smokeless tobacco: Never Used      Comment: started smoking at age 16   • Alcohol use Yes      Comment: 2 glasses of wine a few nights per week   • Drug use: No   • Sexual activity: Defer     Other Topics Concern   • Not on file     Social History Narrative    Works as a .  Lives at home with her daughter.         Current Outpatient Prescriptions:   •  doxycycline (VIBRAMYCIN) 100 MG capsule, Take 100 mg by mouth., Disp: , Rfl:   •  Loratadine (CLARITIN CHILDRENS PO), Take  by mouth., Disp: , Rfl:   No Known Allergies     Objective   Vitals:    05/09/18 1024   BP: 152/71   Pulse: 73   Resp: 16   Temp: 97.8 °F (36.6 °C)   SpO2: 96%     Physical Exam  Independent Review of Radiographic Studies:    ***  Assessment/Plan   Assessment:      Plan:    There are no diagnoses linked to this encounter.

## 2018-05-10 ENCOUNTER — TELEPHONE (OUTPATIENT)
Dept: ONCOLOGY | Facility: CLINIC | Age: 63
End: 2018-05-10

## 2018-05-10 NOTE — TELEPHONE ENCOUNTER
----- Message from April June Marion RN sent at 5/10/2018 12:52 PM EDT -----  Traci,  Can you call me about this patient when you have a minute. Dr. BERNAL wanted there scheduled next week on 05/16 @0800. [phone]

## 2018-05-11 ENCOUNTER — HOSPITAL ENCOUNTER (OUTPATIENT)
Dept: MRI IMAGING | Facility: HOSPITAL | Age: 63
Discharge: HOME | End: 2018-05-11
Attending: INTERNAL MEDICINE

## 2018-05-11 ENCOUNTER — HOSPITAL ENCOUNTER (OUTPATIENT)
Dept: MRI IMAGING | Facility: HOSPITAL | Age: 63
Discharge: HOME | End: 2018-05-11
Attending: INTERNAL MEDICINE | Admitting: INTERNAL MEDICINE

## 2018-05-11 DIAGNOSIS — C79.9 METASTATIC CANCER (HCC): ICD-10-CM

## 2018-05-11 PROCEDURE — 70553 MRI BRAIN STEM W/O & W/DYE: CPT

## 2018-05-11 PROCEDURE — 0 GADOBENATE DIMEGLUMINE 529 MG/ML SOLUTION: Performed by: INTERNAL MEDICINE

## 2018-05-11 PROCEDURE — A9577 INJ MULTIHANCE: HCPCS | Performed by: INTERNAL MEDICINE

## 2018-05-11 PROCEDURE — 72157 MRI CHEST SPINE W/O & W/DYE: CPT

## 2018-05-11 RX ADMIN — GADOBENATE DIMEGLUMINE 8 ML: 529 INJECTION, SOLUTION INTRAVENOUS at 07:54

## 2018-05-14 ENCOUNTER — HOSPITAL ENCOUNTER (OUTPATIENT)
Dept: CT IMAGING | Facility: HOSPITAL | Age: 63
Discharge: HOME | End: 2018-05-14
Attending: INTERNAL MEDICINE | Admitting: INTERNAL MEDICINE

## 2018-05-14 ENCOUNTER — HOSPITAL ENCOUNTER (OUTPATIENT)
Dept: ULTRASOUND IMAGING | Facility: HOSPITAL | Age: 63
Discharge: HOME | End: 2018-05-14
Attending: INTERNAL MEDICINE

## 2018-05-14 VITALS
DIASTOLIC BLOOD PRESSURE: 76 MMHG | TEMPERATURE: 97.3 F | SYSTOLIC BLOOD PRESSURE: 128 MMHG | WEIGHT: 85 LBS | HEIGHT: 64 IN | OXYGEN SATURATION: 97 % | BODY MASS INDEX: 14.51 KG/M2 | HEART RATE: 72 BPM | RESPIRATION RATE: 16 BRPM

## 2018-05-14 VITALS
HEART RATE: 61 BPM | SYSTOLIC BLOOD PRESSURE: 140 MMHG | OXYGEN SATURATION: 94 % | RESPIRATION RATE: 16 BRPM | DIASTOLIC BLOOD PRESSURE: 76 MMHG

## 2018-05-14 DIAGNOSIS — R59.0 ENLARGED LYMPH NODE IN NECK: ICD-10-CM

## 2018-05-14 DIAGNOSIS — C34.90 MALIGNANT NEOPLASM OF LUNG, UNSPECIFIED LATERALITY, UNSPECIFIED PART OF LUNG (HCC): ICD-10-CM

## 2018-05-14 DIAGNOSIS — C79.9 METASTATIC CANCER (HCC): ICD-10-CM

## 2018-05-14 LAB
INR PPP: 1 (ref 0.8–1.2)
PROTHROMBIN TIME: 12.5 SECONDS (ref 12.8–15.2)

## 2018-05-14 PROCEDURE — 76942 ECHO GUIDE FOR BIOPSY: CPT

## 2018-05-14 PROCEDURE — 88305 TISSUE EXAM BY PATHOLOGIST: CPT | Performed by: INTERNAL MEDICINE

## 2018-05-14 PROCEDURE — 88341 IMHCHEM/IMCYTCHM EA ADD ANTB: CPT | Performed by: INTERNAL MEDICINE

## 2018-05-14 PROCEDURE — 88342 IMHCHEM/IMCYTCHM 1ST ANTB: CPT | Performed by: INTERNAL MEDICINE

## 2018-05-14 RX ORDER — LIDOCAINE HYDROCHLORIDE 10 MG/ML
10 INJECTION, SOLUTION INFILTRATION; PERINEURAL ONCE
Status: COMPLETED | OUTPATIENT
Start: 2018-05-14 | End: 2018-05-14

## 2018-05-14 RX ADMIN — LIDOCAINE HYDROCHLORIDE 4 ML: 10 INJECTION, SOLUTION INFILTRATION; PERINEURAL at 08:31

## 2018-05-14 NOTE — DISCHARGE INSTRUCTIONS
EDUCATION /DISCHARGE INSTRUCTIONS  CT/US guided biopsy:  A biopsy is a procedure done to remove tissue for further analysis.  Before images are taken to locate the target area.  Images can be obtained using ultrasound, CT or MRI.  A physician will clean your skin with antiseptic soap, place a sterile towel around the site and administer a local anesthetic to numb the area.  The physician will then insert a special needle.  Sometimes images are taken of the needle after it is inserted to ensure the needle is in the correct area to be biopsied.   A sample is obtained and sent to the laboratory for study.  Occasionally the laboratory is unable to make a diagnosis from the sample and the procedure may need to be repeated.  Within a week the radiologist will send a report to your physician.  A pathologist will also examine the tissue and send a report.      Risks of the procedure include but are not limited to:   *  Bleeding    *  Infection   *  Puncture of surrounding organs *  Death     *  Lung collapse if the biopsy is near the chest which may require insertion of a       tube to re-inflate the lung if severe.      Benefits of the procedure:  Using x-ray helps to locate the area that requires a biopsy. The procedure is less invasive than a surgical procedure, there are no large incisions and it does not require anesthesia.      Alternatives to the procedure:  A biopsy can be performed surgically.  Risks of a surgical biopsy include exposure to anesthesia, infection, excessive bleeding and injury to abdominal organs.  A benefit of surgical biopsy is the ability to see the area to be biopsied and remove of a larger piece of tissue.    THIS EDUCATION INFORMATION WAS REVIEWED PRIOR TO PROCEDURE AND CONSENT. Patient initials__________________Time___________________    Post Procedure:    *  Expect the biopsy site may be tender up to one week.    *  Rest today (no pushing pulling or straining).   *  Slowly increase activity  tomorrow.    *  If you received sedation do not drive for 24 hours.   *  Keep dressing clean and dry.   *  Leave dressing on puncture site for 24 hours.    *  You may shower when dressing removed.    Call your doctor if experiencing:   *  Signs of infection such as redness, swelling, excessive pain and / or foul        smelling drainage from the puncture site.   *  Chills or fever over 101 degrees (by mouth).   *  Unrelieved pain.   *  Any new or severe symptoms.   *  If experiencing sudden / severe shortness of breath or chest pain go to the       nearest emergency room.     Following the procedure:     Follow-up with the ordering physician as directed.    Continue to take other medications as directed by your physician unless    otherwise instructed.   If applicable, resume taking your blood thinners or Aspirin on __No aspirin for 24 hours after procedure__.    If you have any concerns please call the Radiology Nurses Desk at [phone].  You are the most important factor in your recovery.  Follow the above instructions carefully.

## 2018-05-14 NOTE — DISCHARGE INSTRUCTIONS
EDUCATION /DISCHARGE INSTRUCTIONS  CT/US guided biopsy:  A biopsy is a procedure done to remove tissue for further analysis.  Before images are taken to locate the target area.  Images can be obtained using ultrasound, CT or MRI.  A physician will clean your skin with antiseptic soap, place a sterile towel around the site and administer a local anesthetic to numb the area.  The physician will then insert a special needle.  Sometimes images are taken of the needle after it is inserted to ensure the needle is in the correct area to be biopsied.   A sample is obtained and sent to the laboratory for study.  Occasionally the laboratory is unable to make a diagnosis from the sample and the procedure may need to be repeated.  Within a week the radiologist will send a report to your physician.  A pathologist will also examine the tissue and send a report.      Risks of the procedure include but are not limited to:   *  Bleeding    *  Infection   *  Puncture of surrounding organs *  Death     *  Lung collapse if the biopsy is near the chest which may require insertion of a       tube to re-inflate the lung if severe.      Benefits of the procedure:  Using x-ray helps to locate the area that requires a biopsy. The procedure is less invasive than a surgical procedure, there are no large incisions and it does not require anesthesia.      Alternatives to the procedure:  A biopsy can be performed surgically.  Risks of a surgical biopsy include exposure to anesthesia, infection, excessive bleeding and injury to abdominal organs.  A benefit of surgical biopsy is the ability to see the area to be biopsied and remove of a larger piece of tissue.    THIS EDUCATION INFORMATION WAS REVIEWED PRIOR TO PROCEDURE AND CONSENT. Patient initials__________________Time___________________    Post Procedure:    *  Expect the biopsy site may be tender up to one week.    *  Rest today (no pushing pulling or straining).   *  Slowly increase activity  tomorrow.    *  If you received sedation do not drive for 24 hours.   *  Keep dressing clean and dry.   *  Leave dressing on puncture site for 24 hours.    *  You may shower when dressing removed.    Call your doctor if experiencing:   *  Signs of infection such as redness, swelling, excessive pain and / or foul        smelling drainage from the puncture site.   *  Chills or fever over 101 degrees (by mouth).   *  Unrelieved pain.   *  Any new or severe symptoms.   *  If experiencing sudden / severe shortness of breath or chest pain go to the       nearest emergency room.     Following the procedure:     Follow-up with the ordering physician as directed.    Continue to take other medications as directed by your physician unless    otherwise instructed.   If applicable, resume taking your blood thinners or Aspirin on ___________.   If applicable, resume taking Glucophage on ___________.    If you have any concerns please call the Radiology Nurses Desk at [phone].  You are the most important factor in your recovery.  Follow the above instructions carefully.

## 2018-05-14 NOTE — NURSING NOTE
Patient returned from biopsy. Patient has bandaid to left side of neck that is dry and intact. Patient given coffee and icepack. Patient denies pain.

## 2018-05-14 NOTE — H&P (VIEW-ONLY)
Subjective     REASON FOR CONSULTATION:  Metastatic cancer likely lung primary  Provide an opinion on any further workup or treatment                             REQUESTING PHYSICIAN:  Dr. Kevin Heath    RECORDS OBTAINED:  Records of the patients history including those obtained from the referring provider were reviewed and summarized in detail.    HISTORY OF PRESENT ILLNESS:  The patient is a 62 y.o. year old female who is here for an opinion about the above issue.    History of Present Illness patient is a 62-year-old female who had gone to the emergency room at [facility] on March 30, 2018 with intermittent severe sharp right flank pain and right back pain which started a week prior.  She also states that it worsens in the night and it lasted 20-30 minutes and was intermittent.  She had pain after eating.  In the emergency room they did a CT of the abdomen pelvis which showed a 21 mm left adrenal mass.  A 1.5 cm nonobstructing right kidney stone.  Ultrasound of the gallbladder was done which showed no evidence of acute cholecystitis.    She also had a recent mammogram April 19, 2018 which was negative.  She came in with continued pain and went to the primary care physician.  She had a repeat abdominal ultrasound on April 23, 2018.  This showed that the liver and pancreas appeared normal the gallbladder appeared normal the spleen was normal.  She had calcifications in the [place].  There is a 15 mm focus hypoechoic lesion in the right mid pole of the kidney.  Adjacent to the upper pole of the left kidney there is a solid appearing mass which is 28 mm.  This corresponds to the adrenal lesion which was seen on the CT scan previously.    Patient  had a CT scan of the chest with contrast on May 2, 2018.  There is a large heterogeneous mass within the posterior  segment of the right upper lobe which measures approximately 8.8 x 5.6  cm and the craniocaudad span is approximately 6.6 cm. There is extension  into the  posterior chest wall at the posterior right 4th-5th intercostal  space and there is extension into the right T4-5 neural foramen. There  are multiple nodules surrounding the lesion in the right upper lobe.  There is also an irregular 4 x 3 cm mass at the anterior aspect of the  left upper lobe. In addition, there is an 8 mm irregular opacity in the  left apical region and a 6 mm pulmonary nodule inferiorly in the left  upper lobe. There are 2 irregular reticular opacities in the right lower  lobe which both measure approximately 1 cm. There are no pleural or  pericardial effusions. There is ill-defined lymphadenopathy at the right  hilum. There are moderately extensive underlying emphysematous changes.  In the visualized upper abdomen, there is a 3.0 x 2.2 cm left adrenal  nodule. There is a faintly hyperenhancing 7 mm lesion within the  anterior hepatic segment.    Patient continues to have right upper back pain.  She denies any bladder or bowel incontinence.  She denies any headache.  She does not have a tissue diagnosis.  She has also seen that she developed a left neck node which has increased within the last month.  She was referred to ENT Dr. Forbes.  The patient had an endoscopy which apparently was negative.  The neck node is reasonably large about 3 into 4 cm.  She will require core needle biopsy of the neck node which is easy access.  She did lose a lot of weight.        Past Medical History:   Diagnosis Date   • Atherosclerosis of abdominal aorta    • Basal cell carcinoma     Follows up with Dermatology annually, [place] with Dr. Antoine's office   • Bowen's disease of vulva    • Left adrenal mass    • Lung mass    • Lung mass     Bilateral   • Mitral valve prolapse    • Right renal mass    • Rosacea    • Shingles    • Uterine mass         Past Surgical History:   Procedure Laterality Date   • BREAST AUGMENTATION     • EYE SURGERY     • RETINAL LASER PROCEDURE     • VULVECTOMY COMPLETE / RADICAL / PARTIAL       "partial, due to Bowen's Disease        Current Outpatient Prescriptions on File Prior to Visit   Medication Sig Dispense Refill   • doxycycline (VIBRAMYCIN) 100 MG capsule Take 100 mg by mouth.     • Loratadine (CLARITIN CHILDRENS PO) Take  by mouth.       No current facility-administered medications on file prior to visit.         ALLERGIES:  No Known Allergies     Social History     Social History   • Marital status:      Social History Main Topics   • Smoking status: Current Every Day Smoker     Packs/day: 1.50     Types: Cigarettes   • Smokeless tobacco: Never Used      Comment: started smoking at age 16   • Alcohol use Yes      Comment: 2 glasses of wine a few nights per week   • Drug use: No   • Sexual activity: Defer     Other Topics Concern   • Not on file     Social History Narrative    Works as a .  Lives at home with her daughter.          Family History   Problem Relation Age of Onset   • Heart disease Father    • Thyroid cancer Sister    • No Known Problems Brother         Review of Systems   Constitutional: Positive for fatigue and unexpected weight change.   Respiratory: Negative for cough, chest tightness and shortness of breath.    Cardiovascular: Positive for chest pain. Negative for palpitations and leg swelling.   Gastrointestinal: Negative for abdominal pain, blood in stool, nausea and vomiting.   All other systems reviewed and are negative.       Objective     Vitals:    05/09/18 1024   BP: 152/71   Pulse: 73   Resp: 16   Temp: 97.8 °F (36.6 °C)   TempSrc: Oral   SpO2: 96%  Comment: room air   Weight: 39.7 kg (87 lb 9.6 oz)   Height: 162.6 cm (64.02\")     No flowsheet data found.    Physical Exam      GENERAL:  Well-developed, well-nourished in no acute distress.   SKIN:  Warm, dry without rashes, purpura or petechiae.  EYES:  Pupils equal, round and reactive to light.  EOMs intact.  Conjunctivae normal.  EARS:  Hearing intact.  NOSE:  Septum midline.  No excoriations or " nasal discharge.  MOUTH:  Tongue is well-papillated; no stomatitis or ulcers.  Lips normal.  THROAT:  Oropharynx without lesions or exudates.  NECK:  Supple with good range of motion; no thyromegaly or masses, no JVD.  LYMPHATICS:  No cervical, supraclavicular, axillary or inguinal adenopathy.  CHEST:  Lungs clear to auscultation. Good airflow.  CARDIAC:  Regular rate and rhythm without murmurs, rubs or gallops. Normal S1,S2.  ABDOMEN:  Soft, nontender with no hepatosplenomegaly or masses.  EXTREMITIES:  No clubbing, cyanosis or edema.  NEUROLOGICAL:  Cranial Nerves II-XII grossly intact.  No focal neurological deficits.  PSYCHIATRIC:  Normal affect and mood.          RECENT LABS:  Hematology WBC   Date Value Ref Range Status   04/23/2018 7.3 3.4 - 10.8 x10E3/uL Final   03/30/2018 7.84 4.50 - 10.70 10*3/mm3 Final     RBC   Date Value Ref Range Status   04/23/2018 4.45 3.77 - 5.28 x10E6/uL Final   03/30/2018 4.27 3.90 - 5.20 10*6/mm3 Final     Hemoglobin   Date Value Ref Range Status   04/23/2018 14.7 11.1 - 15.9 g/dL Final   03/30/2018 14.0 11.9 - 15.5 g/dL Final     Hematocrit   Date Value Ref Range Status   04/23/2018 42.8 34.0 - 46.6 % Final   03/30/2018 42.6 35.6 - 45.5 % Final     Platelets   Date Value Ref Range Status   04/23/2018 311 150 - 379 x10E3/uL Final   03/30/2018 310 140 - 500 10*3/mm3 Final          Assessment/Plan     1.  Newly diagnosed bilateral lung nodules, with a large 8 cm ×6 cm right upper lobe lung nodule with extension into the intercostal space between the fourth and fifth rib and also extending into the thoracic 4-5 neural foramina.  Patient has bilateral pulmonary nodular opacities which are suspicious for metastasis.  Patient also has a 3 cm left adrenal nodule suspicious for metastasis.  Further evaluation with a PET CT is recommended.  Patient has noticed a left neck nodule and is a highly high nodule which is growing very quickly in the last month.  Patient has seen ENT and  endoscopy was negative.  She does have a significant amount of pain on the right chest wall area.  She does not have a tissue diagnosis and will give 1.  She has distant metastases to the adrenal gland.  I reviewed both the CT scan and the PET scan personally with the patient    2.  Pain with tumor extending near the thoracic 4 and 5 neural canal as well as the intercostal spaces.  Patient will require a PET scan.  He'll also require an MRI of the thoracic spine.    3.  Anxiety and depression    Plan 1.  MRI of the brain    2.  MRI of the thoracic spine    3.  Will receive Ativan 1 mg prior to the MRI    4.  Follow-up extended week    5.  Ultrasound-guided neck node biopsy, we will obtain a core biopsy and needs to be sent to pathology as this is likely metastatic lung cancer.    6.  Will obtain CD X testing on the tissue    Leyla Canada MD        Cc: Dr. Heath

## 2018-05-15 ENCOUNTER — TELEPHONE (OUTPATIENT)
Dept: INTERVENTIONAL RADIOLOGY/VASCULAR | Facility: HOSPITAL | Age: 63
End: 2018-05-15

## 2018-05-16 NOTE — PROGRESS NOTES
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      Provide an opinion on any further workup or treatment                             REQUESTING PHYSICIAN:  Dr. Kevin Heath    RECORDS OBTAINED:  Records of the patients history including those obtained from the referring provider were reviewed and summarized in detail.    HISTORY OF PRESENT ILLNESS:  The patient is a 62 y.o. year old female who is here for an opinion about the above issue.    History of Present Illness patient is a 62-year-old female with metastatic cancer with a 9 cm large right upper lobe lung mass and a right hilar node as well as a left upper lobe lung mass which was 4 cm.  Patient has left adrenal gland metastasis and left neck node metastasis.  Patient underwent MRI of the brain and thoracic spine.  MRI brain is negative and thoracic spine MRI does not show evidence of any cord compression except involvement and impingement at the T4 and approved.    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  This is thought to be a small cell metastatic lung cancer.  Patient is here to discuss options of treatment.    I had a lengthy discussion about consideration of chemotherapy with carboplatin/-16.        Oncologic history  patient is a 62-year-old female who had gone to the emergency room at [facility] on March 30, 2018 with intermittent severe sharp right flank pain and right back pain which started a week prior.  She also states that it worsens in the night and it lasted 20-30 minutes and was intermittent.  She had pain after eating.  In the emergency room they did a CT of the abdomen pelvis which showed a 21 mm left adrenal mass.  A 1.5 cm nonobstructing right kidney stone.  Ultrasound  of the gallbladder was done which showed no evidence of acute cholecystitis.    She also had a recent mammogram April 19, 2018 which was negative.  She came in with continued pain and went to the primary care physician.  She had a repeat abdominal ultrasound on April 23, 2018.  This showed that the liver and pancreas appeared normal the gallbladder appeared normal the spleen was normal.  She had calcifications in the [place].  There is a 15 mm focus hypoechoic lesion in the right mid pole of the kidney.  Adjacent to the upper pole of the left kidney there is a solid appearing mass which is 28 mm.  This corresponds to the adrenal lesion which was seen on the CT scan previously.    Patient  had a CT scan of the chest with contrast on May 2, 2018.  There is a large heterogeneous mass within the posterior  segment of the right upper lobe which measures approximately 8.8 x 5.6  cm and the craniocaudad span is approximately 6.6 cm. There is extension  into the posterior chest wall at the posterior right 4th-5th intercostal  space and there is extension into the right T4-5 neural foramen. There  are multiple nodules surrounding the lesion in the right upper lobe.  There is also an irregular 4 x 3 cm mass at the anterior aspect of the  left upper lobe. In addition, there is an 8 mm irregular opacity in the  left apical region and a 6 mm pulmonary nodule inferiorly in the left  upper lobe. There are 2 irregular reticular opacities in the right lower  lobe which both measure approximately 1 cm. There are no pleural or  pericardial effusions. There is ill-defined lymphadenopathy at the right  hilum. There are moderately extensive underlying emphysematous changes.  In the visualized upper abdomen, there is a 3.0 x 2.2 cm left adrenal  nodule. There is a faintly hyperenhancing 7 mm lesion within the  anterior hepatic segment.    Patient continues to have right upper back pain.  She denies any bladder or bowel incontinence.  She  denies any headache.  She does not have a tissue diagnosis.  She has also seen that she developed a left neck node which has increased within the last month.  She was referred to ENT Dr. Forbes.  The patient had an endoscopy which apparently was negative.  The neck node is reasonably large about 3 into 4 cm.  She will require core needle biopsy of the neck node which is easy access.  She did lose a lot of weight.  MRI brain is negative and thoracic spine MRI does not show evidence of any cord compression except involvement and impingement at the T4 and approved.    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  This is thought to be a small cell metastatic lung cancer.  Patient is here to discuss options of treatment.    I had a lengthy discussion about consideration of chemotherapy with carboplatin/-16.        Past Medical History:   Diagnosis Date   • Anxiety    • Atherosclerosis of abdominal aorta    • Basal cell carcinoma     Follows up with Dermatology annually, [place] with Dr. Antoine's office   • Bowen's disease of vulva    • Depression    • H/O Left adrenal mass 2018   • History of kidney stone 2018   • Left adrenal mass    • Lung mass    • Lung mass     Bilateral   • Metastatic cancer    • Mitral valve prolapse    • Neuropathy    • Prolapse of mitral valve    • Right renal mass    • Rosacea    • Seasonal allergies    • Shingles    • Uterine mass         Past Surgical History:   Procedure Laterality Date   • BREAST AUGMENTATION     • EYE SURGERY     • RETINAL LASER PROCEDURE     • US GUIDED LYMPH NODE BIOPSY  5/14/2018   • VULVECTOMY COMPLETE / RADICAL / PARTIAL      partial, due to Bowen's Disease        Current Outpatient Prescriptions on File Prior to Visit   Medication Sig Dispense Refill   • doxycycline (VIBRAMYCIN) 100 MG capsule Take 100 mg by mouth As Needed.     • Loratadine (CLARITIN CHILDRENS PO) Take 5 mL by mouth Daily.       No current  facility-administered medications on file prior to visit.         ALLERGIES:  No Known Allergies     Social History     Social History   • Marital status:      Occupational History   •       self-employed     Social History Main Topics   • Smoking status: Current Every Day Smoker     Packs/day: 1.50     Types: Cigarettes   • Smokeless tobacco: Never Used      Comment: started smoking at age 16   • Alcohol use Yes      Comment: 2 glasses of wine a few nights per week   • Drug use: No   • Sexual activity: Defer     Other Topics Concern   • Not on file     Social History Narrative    Works as a .  Lives at home with her daughter.          Family History   Problem Relation Age of Onset   • Other Mother         Cerebral hemorrhage   • Heart disease Father    • Thyroid cancer Sister    • No Known Problems Brother         Review of Systems   Constitutional: Positive for fatigue and unexpected weight change.   Respiratory: Negative for cough, chest tightness and shortness of breath.    Cardiovascular: Positive for chest pain. Negative for palpitations and leg swelling.   Gastrointestinal: Negative for abdominal pain, blood in stool, nausea and vomiting.   All other systems reviewed and are negative.       Objective     There were no vitals filed for this visit.  No flowsheet data found.    Physical Exam      GENERAL:  Well-developed, well-nourished in no acute distress.   SKIN:  Warm, dry without rashes, purpura or petechiae.  EYES:  Pupils equal, round and reactive to light.  EOMs intact.  Conjunctivae normal.  EARS:  Hearing intact.  NOSE:  Septum midline.  No excoriations or nasal discharge.  MOUTH:  Tongue is well-papillated; no stomatitis or ulcers.  Lips normal.  THROAT:  Oropharynx without lesions or exudates.  NECK:  Supple with good range of motion; no thyromegaly or masses, no JVD.  LYMPHATICS:  No cervical, supraclavicular, axillary or inguinal adenopathy.  CHEST:  Lungs clear  "to auscultation. Good airflow.  CARDIAC:  Regular rate and rhythm without murmurs, rubs or gallops. Normal S1,S2.  ABDOMEN:  Soft, nontender with no hepatosplenomegaly or masses.  EXTREMITIES:  No clubbing, cyanosis or edema.  NEUROLOGICAL:  Cranial Nerves II-XII grossly intact.  No focal neurological deficits.  PSYCHIATRIC:  Normal affect and mood.        Left Neck mass   Final Diagnosis   \"ULTRASOUND GUIDED LEFT NECK MASS BIOPSY\", NEEDLE BIOPSY:               NEUROENDOCRINE CARCINOMA WITH NECROSIS, PREDOMINANTLY SMALL CELL TYPE.                PENDING:  [facility] AS PER REQUEST.         Final Diagnosis   \"ULTRASOUND GUIDED LEFT NECK MASS BIOPSY\", NEEDLE BIOPSY:               NEUROENDOCRINE CARCINOMA WITH NECROSIS, PREDOMINANTLY SMALL CELL TYPE.                PENDING:  [facility] ONE MOLECULAR STUDIES AS PER REQUEST.      THM/brb IHC/a/CMK     CPT CODES:  1.  17623, 83774, 88341 x6   Electronically signed by Roberto Bates MD on 5/15/2018 at 1426   Intradepartmental Consult    Dr. PIERRE Ash who concurs.      Gross Description    Received in formalin labeled \"left neck biopsy\" are multiple white tan needle core biopsies up to 1.9 cm long and less than 0.1 cm across.  The tissue is entirely submitted in a single block labeled 1A.  CC/USO/CMK/brb    Special Stains    Immunoperoxidase stains with appropriate controls are performed on block 1A with tumor cells showing the following reactivity:      CK 7:   positive  CK 20:  negative  AE I/3:    positive  TTF-1:    positive  Synaptophysin:   positive  Cd56:   positive  CD45:  negative   Microscopic Description        RECENT LABS:  Hematology WBC   Date Value Ref Range Status   04/23/2018 7.3 3.4 - 10.8 x10E3/uL Final   03/30/2018 7.84 4.50 - 10.70 10*3/mm3 Final     RBC   Date Value Ref Range Status   04/23/2018 4.45 3.77 - 5.28 x10E6/uL Final   03/30/2018 4.27 3.90 - 5.20 10*6/mm3 Final     Hemoglobin   Date Value Ref Range Status "   04/23/2018 14.7 11.1 - 15.9 g/dL Final   03/30/2018 14.0 11.9 - 15.5 g/dL Final     Hematocrit   Date Value Ref Range Status   04/23/2018 42.8 34.0 - 46.6 % Final   03/30/2018 42.6 35.6 - 45.5 % Final     Platelets   Date Value Ref Range Status   04/23/2018 311 150 - 379 x10E3/uL Final   03/30/2018 310 140 - 500 10*3/mm3 Final          Assessment/Plan     1.  Newly diagnosed bilateral lung nodules, with a large 8 cm ×6 cm right upper lobe lung nodule with extension into the intercostal space between the fourth and fifth rib and also extending into the thoracic 4-5 neural foramina.  Patient has bilateral pulmonary nodular opacities which are suspicious for metastasis.  Patient also has a 3 cm left adrenal nodule suspicious for metastasis.  Further evaluation with a PET CT is recommended.  Patient has noticed a left neck nodule and is a highly high nodule which is growing very quickly in the last month.  Patient has seen ENT and endoscopy was negative.  She does have a significant amount of pain on the right chest wall area.  She does not have a tissue diagnosis and will give 1.  She has distant metastases to the adrenal gland.  I reviewed both the CT scan .  Patient has extensive disease with involvement of the left adrenal gland and left neck node.  I have reviewed the pathology in length with the patient and is consistent with small cell consistent with a lung primary.  I did discuss with pathologist Dr. Roberto Bates and he does think that there is some intermediate appearing cells in addition to small cells with necrosis and that this is a lung primary.  I had a lengthy discussion about consideration of chemotherapy with carboplatinum -16.  I also reviewed the MRI of the brain and MRI of the spine with the patient.  MRI brain negative and MRI of the spine shows no impingement without any cord compression.  Patient has no neurological symptoms.    I have educated her on the side effects of chemotherapy with  carboplatinum and -16 and we will plan to obtain a port placement and started chemotherapy next week.    3.  Anxiety and depression    Plan 1. Obtain port placement in 1 week.    2.  Chemotherapy education with nurse practitioner next week with every 3 weeks carbo platinum/-16 ×4- 6 cycles    3.  Follow-up with me next with the stay to start chemotherapy with day 1 carboplatinum and day 1-3 -16    6.  Await [facility]  CD X testing on the tissue    Leyla Canada MD        Cc: Dr. Heath

## 2018-05-17 ENCOUNTER — OFFICE VISIT (OUTPATIENT)
Dept: ONCOLOGY | Facility: CLINIC | Age: 63
End: 2018-05-17

## 2018-05-17 ENCOUNTER — APPOINTMENT (OUTPATIENT)
Dept: LAB | Facility: HOSPITAL | Age: 63
End: 2018-05-17

## 2018-05-17 VITALS
TEMPERATURE: 99.2 F | RESPIRATION RATE: 14 BRPM | OXYGEN SATURATION: 97 % | DIASTOLIC BLOOD PRESSURE: 78 MMHG | BODY MASS INDEX: 14.75 KG/M2 | HEART RATE: 66 BPM | WEIGHT: 86.4 LBS | SYSTOLIC BLOOD PRESSURE: 122 MMHG | HEIGHT: 64 IN

## 2018-05-17 DIAGNOSIS — C34.90 SMALL CELL LUNG CANCER IN ADULT (HCC): ICD-10-CM

## 2018-05-17 DIAGNOSIS — R91.8 LUNG MASS: ICD-10-CM

## 2018-05-17 DIAGNOSIS — C34.90 MALIGNANT NEOPLASM OF LUNG, UNSPECIFIED LATERALITY, UNSPECIFIED PART OF LUNG (HCC): Primary | ICD-10-CM

## 2018-05-17 DIAGNOSIS — C44.91 BASAL CELL CARCINOMA, UNSPECIFIED SITE: Primary | ICD-10-CM

## 2018-05-17 LAB
BASOPHILS # BLD AUTO: 0.04 10*3/MM3 (ref 0–0.1)
BASOPHILS NFR BLD AUTO: 0.6 % (ref 0–1.1)
DEPRECATED RDW RBC AUTO: 42.9 FL (ref 37–49)
EOSINOPHIL # BLD AUTO: 0.06 10*3/MM3 (ref 0–0.36)
EOSINOPHIL NFR BLD AUTO: 0.8 % (ref 1–5)
ERYTHROCYTE [DISTWIDTH] IN BLOOD BY AUTOMATED COUNT: 12.3 % (ref 11.7–14.5)
HCT VFR BLD AUTO: 43.8 % (ref 34–45)
HGB BLD-MCNC: 14.7 G/DL (ref 11.5–14.9)
IMM GRANULOCYTES # BLD: 0.04 10*3/MM3 (ref 0–0.03)
IMM GRANULOCYTES NFR BLD: 0.6 % (ref 0–0.5)
LYMPHOCYTES # BLD AUTO: 1.35 10*3/MM3 (ref 1–3.5)
LYMPHOCYTES NFR BLD AUTO: 18.7 % (ref 20–49)
MCH RBC QN AUTO: 31.9 PG (ref 27–33)
MCHC RBC AUTO-ENTMCNC: 33.6 G/DL (ref 32–35)
MCV RBC AUTO: 95 FL (ref 83–97)
MONOCYTES # BLD AUTO: 0.95 10*3/MM3 (ref 0.25–0.8)
MONOCYTES NFR BLD AUTO: 13.2 % (ref 4–12)
NEUTROPHILS # BLD AUTO: 4.78 10*3/MM3 (ref 1.5–7)
NEUTROPHILS NFR BLD AUTO: 66.1 % (ref 39–75)
NRBC BLD MANUAL-RTO: 0 /100 WBC (ref 0–0)
PLATELET # BLD AUTO: 269 10*3/MM3 (ref 150–375)
PMV BLD AUTO: 9.7 FL (ref 8.9–12.1)
RBC # BLD AUTO: 4.61 10*6/MM3 (ref 3.9–5)
WBC NRBC COR # BLD: 7.22 10*3/MM3 (ref 4–10)

## 2018-05-17 PROCEDURE — 99215 OFFICE O/P EST HI 40 MIN: CPT | Performed by: INTERNAL MEDICINE

## 2018-05-17 PROCEDURE — 85025 COMPLETE CBC W/AUTO DIFF WBC: CPT | Performed by: INTERNAL MEDICINE

## 2018-05-17 PROCEDURE — 36416 COLLJ CAPILLARY BLOOD SPEC: CPT | Performed by: INTERNAL MEDICINE

## 2018-05-18 ENCOUNTER — TELEPHONE (OUTPATIENT)
Dept: FAMILY MEDICINE CLINIC | Facility: CLINIC | Age: 63
End: 2018-05-18

## 2018-05-18 NOTE — TELEPHONE ENCOUNTER
Pt called and new diagnosis of metastatic lung cancer discussed.  She has established care with an Oncologist, Dr. Canada and has plans to start chemotherapy next week.  She will also have a port placed next week.  Her daughter has canceled a trip to [place] in order to help her mother through treatment.  Pt's sister also lives nearby and plans to lend support.  Pt reports having no medical questions at this time.

## 2018-05-21 ENCOUNTER — OFFICE VISIT (OUTPATIENT)
Dept: SURGERY | Facility: CLINIC | Age: 63
End: 2018-05-21

## 2018-05-21 ENCOUNTER — APPOINTMENT (OUTPATIENT)
Dept: LAB | Facility: HOSPITAL | Age: 63
End: 2018-05-21

## 2018-05-21 ENCOUNTER — OFFICE VISIT (OUTPATIENT)
Dept: ONCOLOGY | Facility: CLINIC | Age: 63
End: 2018-05-21

## 2018-05-21 ENCOUNTER — APPOINTMENT (OUTPATIENT)
Dept: ONCOLOGY | Facility: CLINIC | Age: 63
End: 2018-05-21

## 2018-05-21 VITALS — BODY MASS INDEX: 14.97 KG/M2 | WEIGHT: 87.2 LBS

## 2018-05-21 VITALS — OXYGEN SATURATION: 97 % | WEIGHT: 87.2 LBS | BODY MASS INDEX: 14.89 KG/M2 | HEIGHT: 64 IN | HEART RATE: 72 BPM

## 2018-05-21 DIAGNOSIS — C80.1 ADENOCARCINOMA (HCC): Primary | ICD-10-CM

## 2018-05-21 DIAGNOSIS — C34.91 SMALL CELL CARCINOMA OF RIGHT LUNG (HCC): Primary | ICD-10-CM

## 2018-05-21 DIAGNOSIS — C44.91 BASAL CELL CARCINOMA, UNSPECIFIED SITE: Primary | ICD-10-CM

## 2018-05-21 DIAGNOSIS — C34.91 SMALL CELL CARCINOMA OF RIGHT LUNG (HCC): ICD-10-CM

## 2018-05-21 PROBLEM — Z45.2 FITTING AND ADJUSTMENT OF VASCULAR CATHETER: Status: ACTIVE | Noted: 2018-05-21

## 2018-05-21 PROCEDURE — G0463 HOSPITAL OUTPT CLINIC VISIT: HCPCS | Performed by: NURSE PRACTITIONER

## 2018-05-21 PROCEDURE — 99203 OFFICE O/P NEW LOW 30 MIN: CPT | Performed by: SURGERY

## 2018-05-21 PROCEDURE — 99215 OFFICE O/P EST HI 40 MIN: CPT | Performed by: NURSE PRACTITIONER

## 2018-05-21 RX ORDER — DEXAMETHASONE 4 MG/1
TABLET ORAL
Qty: 40 TABLET | Refills: 0 | Status: SHIPPED | OUTPATIENT
Start: 2018-05-21 | End: 2018-06-05

## 2018-05-21 RX ORDER — SODIUM CHLORIDE 0.9 % (FLUSH) 0.9 %
10 SYRINGE (ML) INJECTION AS NEEDED
Status: CANCELLED | OUTPATIENT
Start: 2018-05-22

## 2018-05-21 RX ORDER — CEFAZOLIN SODIUM 2 G/100ML
2 INJECTION, SOLUTION INTRAVENOUS ONCE
Status: CANCELLED | OUTPATIENT
Start: 2018-05-25 | End: 2018-05-25

## 2018-05-21 RX ORDER — ONDANSETRON 4 MG/1
4 TABLET, FILM COATED ORAL EVERY 8 HOURS PRN
Qty: 30 TABLET | Refills: 2 | Status: SHIPPED | OUTPATIENT
Start: 2018-05-21 | End: 2018-06-05

## 2018-05-21 NOTE — PROGRESS NOTES
Subjective     PATIENT NAME:  Sujata Waters  YOB: 1955  PATIENTS AGE:  63 y.o.  PATIENTS SEX:  female  DATE OF SERVICE:  05/21/2018  PROVIDER:  KIRAN Garcia      ____________________PATIENT EDUCATION____________________    PATIENT EDUCATION:  Today I met with the patient to discuss the chemotherapy regimen recommended for treatment of her Small Cell Lung Cancer.  The patient was given explanation of treatment premed side effects including office policy that prohibits patients to drive if sedating medications are administered, MD explanation given regarding benefits, side effects, toxicities and goals of treatment.  The patient received a Chemotherapy/Biotherapy Plan Summary including diagnosis and specific treatment plan.    SIDE EFFECTS:  Common side effects were discussed with the patient and/or significant other.  Discussion included hair loss/discoloration, anemia/fatigue, infection/chills/fever, appetite, bleeding risk/precautions, constipation, diarrhea, mouth sores, taste alteration, loss of appetite,nausea/vomiting, peripheral neuropathy, skin/nail changes, rash, muscle aches/weakness, photosensitivity, weight gain/loss, hearing loss, dizziness, menopausal symptoms, menstrrual irregularity, sterility, high blood pressure, heart damage, liver damage, lung damage, kidney damage, DVT/PE risk, fluid retention, pleural/pericardial effusion, somnolence, electrolyte/LFT imbalance, vein exercises and/or the possible need for vascular access/port placement.  The patient was advice that although uncommon, leakage of an infused medication from the vein or venous access device (port) may lead to skin breakdown and/or other tissue damage.  The patient was advised that he/she may have pain, bleeding, and/or bruising from the insertion of a needle in their vein or venous access device (port).  The patient was further advised that, in spite of proper technique, infection with redness and  irritation may rarely occur at the site where the needle was inserted.  The patient was advised that if complications occur, additional medical treatment is available.    Discussion also included side effects specific to drugs in the treatment plan, specifically Carboplatin and Etoposide.     Reproductive risks were discussed, including appropriate use of birth control and protection during sexual relations.    A total of 40 minutes were spent with the patient, with 100% of time spent in education and counseling.

## 2018-05-21 NOTE — PROGRESS NOTES
Cc: Lung cancer    History of presenting illness:   This is a nice, 63-year-old lady recently diagnosed with small cell carcinoma of the lung who presents with the need for port placement.  She initially presented with some right-sided abdominal pain and had a CT which demonstrated a left adrenal mass and this was subsequently worked up.  Eventually she had a biopsy of a node in her neck which demonstrated neuroendocrine carcinoma predominantly small cell type and full workup was suggestive of a lung cancer primary.  She reports breathing comfortably.  She has minimal abdominal pain at this point.  No radiation of any pain.  She has had some weight loss.    Past Medical History: Anxiety, mitral valve prolapse, rosacea    Past Surgical History: Breast augmentation, ultrasound-guided lymph node biopsy, partial vulvectomy    Medications: Doxycycline, Claritin    Allergies: None known    Social History: Patient is a long-term cigarette smoker proximally since age 16.  Admits to drinking a couple of alcoholic beverages per week.    Family History: Heart disease in her father, thyroid cancer in her sister    Review of Systems:  Constitutional: Negative for fever, positive for fatigue and weight loss  Neck: no swollen glands or dysphagia or odynophagia  Respiratory: negative for SOB, cough, hemoptysis or wheezing  Cardiovascular: negative for chest pain, palpitations or peripheral edema  Gastrointestinal: No abdominal pain currently no blood in stool, negative for nausea or vomiting      Physical Exam:    General: alert and oriented, appropriate, no acute distress  Neck: Supple without lymphadenopathy or thyromegaly, trachea is in the midline  Respiratory: Lungs are clear bilaterally without wheezing, no use of accessory muscles is noted  Cardiovascular: Regular rate and rhythm without murmur, no peripheral edema  Gastrointestinal: Soft, benign, no hernia or hepatosplenomegaly    Laboratory data: White blood cell count  7.2.  Hemoglobin 14.7.  Platelets 269.  Pathology report reviewed.  Ultrasound guided left neck mass with neuroendocrine carcinoma with necrosis, probably small cell type.    Imaging data: CT demonstrates 9 cm right upper lobe mass.  4 cm left upper lobe mass.      Assessment and plan:   Metastatic small cell lung cancer  Plan for Gpnsjm-m-Kcle placement for chemotherapy.  Due to the thin and frail nature of the patient I have recommended a right internal jugular vein approach.  I think she would be at high risk for pneumothorax with a subclavian approach.  Risks and benefits including bleeding, pneumothorax, infection, device failure discussed with patient.      Jelani Granger MD, FACS  General, Minimally Invasive and Endoscopic Surgery  [facility]    [address], [address]  [place], [place], [address]  P: [phone]  F: [phone]

## 2018-05-22 ENCOUNTER — INFUSION (OUTPATIENT)
Dept: ONCOLOGY | Facility: HOSPITAL | Age: 63
End: 2018-05-22

## 2018-05-22 ENCOUNTER — APPOINTMENT (OUTPATIENT)
Dept: LAB | Facility: HOSPITAL | Age: 63
End: 2018-05-22

## 2018-05-22 ENCOUNTER — OFFICE VISIT (OUTPATIENT)
Dept: ONCOLOGY | Facility: CLINIC | Age: 63
End: 2018-05-22

## 2018-05-22 ENCOUNTER — APPOINTMENT (OUTPATIENT)
Dept: ONCOLOGY | Facility: CLINIC | Age: 63
End: 2018-05-22

## 2018-05-22 ENCOUNTER — DOCUMENTATION (OUTPATIENT)
Dept: ONCOLOGY | Facility: CLINIC | Age: 63
End: 2018-05-22

## 2018-05-22 ENCOUNTER — APPOINTMENT (OUTPATIENT)
Dept: ONCOLOGY | Facility: HOSPITAL | Age: 63
End: 2018-05-22

## 2018-05-22 VITALS
HEART RATE: 68 BPM | RESPIRATION RATE: 14 BRPM | WEIGHT: 88.6 LBS | TEMPERATURE: 98.3 F | SYSTOLIC BLOOD PRESSURE: 102 MMHG | DIASTOLIC BLOOD PRESSURE: 60 MMHG | BODY MASS INDEX: 15.13 KG/M2 | HEIGHT: 64 IN | OXYGEN SATURATION: 97 %

## 2018-05-22 DIAGNOSIS — C44.91 BASAL CELL CARCINOMA, UNSPECIFIED SITE: ICD-10-CM

## 2018-05-22 DIAGNOSIS — C34.91 SMALL CELL CARCINOMA OF RIGHT LUNG (HCC): ICD-10-CM

## 2018-05-22 DIAGNOSIS — C34.91 SMALL CELL CARCINOMA OF RIGHT LUNG (HCC): Primary | ICD-10-CM

## 2018-05-22 LAB
ALBUMIN SERPL-MCNC: 4.5 G/DL (ref 3.5–5.2)
ALBUMIN/GLOB SERPL: 1.3 G/DL (ref 1.1–2.4)
ALP SERPL-CCNC: 96 U/L (ref 38–116)
ALT SERPL W P-5'-P-CCNC: 13 U/L (ref 0–33)
ANION GAP SERPL CALCULATED.3IONS-SCNC: 14.1 MMOL/L
AST SERPL-CCNC: 27 U/L (ref 0–32)
BASOPHILS # BLD AUTO: 0.04 10*3/MM3 (ref 0–0.1)
BASOPHILS NFR BLD AUTO: 0.7 % (ref 0–1.1)
BILIRUB SERPL-MCNC: 0.4 MG/DL (ref 0.1–1.2)
BUN BLD-MCNC: 8 MG/DL (ref 6–20)
BUN/CREAT SERPL: 19.5 (ref 7.3–30)
CALCIUM SPEC-SCNC: 10 MG/DL (ref 8.5–10.2)
CHLORIDE SERPL-SCNC: 94 MMOL/L (ref 98–107)
CO2 SERPL-SCNC: 26.9 MMOL/L (ref 22–29)
CREAT BLD-MCNC: 0.41 MG/DL (ref 0.6–1.1)
DEPRECATED RDW RBC AUTO: 43.9 FL (ref 37–49)
EOSINOPHIL # BLD AUTO: 0.02 10*3/MM3 (ref 0–0.36)
EOSINOPHIL NFR BLD AUTO: 0.3 % (ref 1–5)
ERYTHROCYTE [DISTWIDTH] IN BLOOD BY AUTOMATED COUNT: 12.3 % (ref 11.7–14.5)
GFR SERPL CREATININE-BSD FRML MDRD: >150 ML/MIN/1.73
GLOBULIN UR ELPH-MCNC: 3.5 GM/DL (ref 1.8–3.5)
GLUCOSE BLD-MCNC: 161 MG/DL (ref 74–124)
HCT VFR BLD AUTO: 48.1 % (ref 34–45)
HGB BLD-MCNC: 16.1 G/DL (ref 11.5–14.9)
IMM GRANULOCYTES # BLD: 0.01 10*3/MM3 (ref 0–0.03)
IMM GRANULOCYTES NFR BLD: 0.2 % (ref 0–0.5)
LYMPHOCYTES # BLD AUTO: 0.99 10*3/MM3 (ref 1–3.5)
LYMPHOCYTES NFR BLD AUTO: 16.4 % (ref 20–49)
MCH RBC QN AUTO: 32.1 PG (ref 27–33)
MCHC RBC AUTO-ENTMCNC: 33.5 G/DL (ref 32–35)
MCV RBC AUTO: 96 FL (ref 83–97)
MONOCYTES # BLD AUTO: 0.6 10*3/MM3 (ref 0.25–0.8)
MONOCYTES NFR BLD AUTO: 9.9 % (ref 4–12)
NEUTROPHILS # BLD AUTO: 4.39 10*3/MM3 (ref 1.5–7)
NEUTROPHILS NFR BLD AUTO: 72.5 % (ref 39–75)
NRBC BLD MANUAL-RTO: 0 /100 WBC (ref 0–0)
PLATELET # BLD AUTO: 297 10*3/MM3 (ref 150–375)
PMV BLD AUTO: 9.8 FL (ref 8.9–12.1)
POTASSIUM BLD-SCNC: 3.9 MMOL/L (ref 3.5–4.7)
PROT SERPL-MCNC: 8 G/DL (ref 6.3–8)
RBC # BLD AUTO: 5.01 10*6/MM3 (ref 3.9–5)
SODIUM BLD-SCNC: 135 MMOL/L (ref 134–145)
WBC NRBC COR # BLD: 6.05 10*3/MM3 (ref 4–10)

## 2018-05-22 PROCEDURE — 25010000002 FOSAPREPITANT PER 1 MG: Performed by: INTERNAL MEDICINE

## 2018-05-22 PROCEDURE — 96375 TX/PRO/DX INJ NEW DRUG ADDON: CPT | Performed by: INTERNAL MEDICINE

## 2018-05-22 PROCEDURE — 36415 COLL VENOUS BLD VENIPUNCTURE: CPT | Performed by: INTERNAL MEDICINE

## 2018-05-22 PROCEDURE — 25010000002 PALONOSETRON PER 25 MCG: Performed by: INTERNAL MEDICINE

## 2018-05-22 PROCEDURE — 96417 CHEMO IV INFUS EACH ADDL SEQ: CPT | Performed by: INTERNAL MEDICINE

## 2018-05-22 PROCEDURE — 80053 COMPREHEN METABOLIC PANEL: CPT | Performed by: INTERNAL MEDICINE

## 2018-05-22 PROCEDURE — 25010000002 CARBOPLATIN PER 50 MG: Performed by: INTERNAL MEDICINE

## 2018-05-22 PROCEDURE — 25010000002 ETOPOSIDE 100 MG/5ML SOLUTION 5 ML VIAL: Performed by: INTERNAL MEDICINE

## 2018-05-22 PROCEDURE — 99215 OFFICE O/P EST HI 40 MIN: CPT | Performed by: INTERNAL MEDICINE

## 2018-05-22 PROCEDURE — 96413 CHEMO IV INFUSION 1 HR: CPT | Performed by: INTERNAL MEDICINE

## 2018-05-22 PROCEDURE — 85025 COMPLETE CBC W/AUTO DIFF WBC: CPT | Performed by: INTERNAL MEDICINE

## 2018-05-22 PROCEDURE — 96367 TX/PROPH/DG ADDL SEQ IV INF: CPT | Performed by: INTERNAL MEDICINE

## 2018-05-22 PROCEDURE — 25010000002 DEXAMETHASONE: Performed by: INTERNAL MEDICINE

## 2018-05-22 RX ORDER — SODIUM CHLORIDE 9 MG/ML
250 INJECTION, SOLUTION INTRAVENOUS ONCE
Status: COMPLETED | OUTPATIENT
Start: 2018-05-22 | End: 2018-05-22

## 2018-05-22 RX ORDER — SODIUM CHLORIDE 9 MG/ML
250 INJECTION, SOLUTION INTRAVENOUS ONCE
Status: CANCELLED | OUTPATIENT
Start: 2018-05-22

## 2018-05-22 RX ORDER — SODIUM CHLORIDE 9 MG/ML
250 INJECTION, SOLUTION INTRAVENOUS ONCE
Status: CANCELLED | OUTPATIENT
Start: 2018-05-24

## 2018-05-22 RX ORDER — ONDANSETRON HYDROCHLORIDE 8 MG/1
8 TABLET, FILM COATED ORAL 3 TIMES DAILY PRN
Qty: 30 TABLET | Refills: 5 | Status: SHIPPED | OUTPATIENT
Start: 2018-05-22 | End: 2018-06-05

## 2018-05-22 RX ORDER — PALONOSETRON 0.05 MG/ML
0.25 INJECTION, SOLUTION INTRAVENOUS ONCE
Status: CANCELLED | OUTPATIENT
Start: 2018-05-22

## 2018-05-22 RX ORDER — PROCHLORPERAZINE MALEATE 10 MG
10 TABLET ORAL ONCE
Status: CANCELLED | OUTPATIENT
Start: 2018-05-24 | End: 2018-05-24

## 2018-05-22 RX ORDER — PALONOSETRON 0.05 MG/ML
0.25 INJECTION, SOLUTION INTRAVENOUS ONCE
Status: COMPLETED | OUTPATIENT
Start: 2018-05-22 | End: 2018-05-22

## 2018-05-22 RX ORDER — SODIUM CHLORIDE 9 MG/ML
250 INJECTION, SOLUTION INTRAVENOUS ONCE
Status: CANCELLED | OUTPATIENT
Start: 2018-05-23

## 2018-05-22 RX ORDER — PROCHLORPERAZINE MALEATE 10 MG
10 TABLET ORAL ONCE
Status: CANCELLED | OUTPATIENT
Start: 2018-05-23 | End: 2018-05-23

## 2018-05-22 RX ADMIN — ETOPOSIDE 140 MG: 20 INJECTION INTRAVENOUS at 15:53

## 2018-05-22 RX ADMIN — SODIUM CHLORIDE 250 ML: 900 INJECTION, SOLUTION INTRAVENOUS at 14:14

## 2018-05-22 RX ADMIN — PALONOSETRON HYDROCHLORIDE 0.25 MG: 0.25 INJECTION INTRAVENOUS at 14:14

## 2018-05-22 RX ADMIN — DEXAMETHASONE SODIUM PHOSPHATE 12 MG: 4 INJECTION, SOLUTION INTRAMUSCULAR; INTRAVENOUS at 14:48

## 2018-05-22 RX ADMIN — SODIUM CHLORIDE 150 MG: 900 INJECTION, SOLUTION INTRAVENOUS at 14:15

## 2018-05-22 RX ADMIN — CARBOPLATIN 450 MG: 10 INJECTION, SOLUTION INTRAVENOUS at 15:06

## 2018-05-22 NOTE — PROGRESS NOTES
On 18, KAYDEN met with the patient after her chemo education session. She will have her first chemo treatment tomorrow. She states she was amazed at her diagnosis, as she had no symptoms. A CT scan was done routinely, as she has a 50 year history of smoking. The cancer was then found.     Pt has been  twice. Her first   of lymphoma. Her second  of a brain tumor about 20 years ago. She and her  adopted a daughter from [place]. She was 11 months at the time of adoption. Pt talked about the difficult adjustment her daughter had when she was taken from her foster parents.     Her daughter lives with her in her own home. Her daughter has a part-time job now as she has gone through school. On 18, she will be starting a new full-time job as an  with [employer]. Pt hopes that her cancer will not interfere in any way with her daughter's job.     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 She said that as the  she worked with have gotten older, her work has also decreased. She has now stopped work completely.     Pt said her sister will accompany her to treatment tomorrow. She does not want her daughter to have treatment memories of her, and does not want her to be with her. Pt will have 4 - 6 cycles of chemo, every 3 weeks.    Pt is oriented x 3. She is very thoughtful and careful when speaking. She is very thin. She said she is now 88#, but was 78# last month. She has been eating a lot of high calorie food to increase her weight, to help her through chemotherapy.  services were reviewed and assistance offered as needed in the future.

## 2018-05-22 NOTE — PROGRESS NOTES
Subjective     REASON FOR CONSULTATION: 1.  Extensive  stage small cell carcinoma of the lung with metastasis to the neck node and left adrenal gland and questionable lesion at C5 cervical vertebrae, and her impingement on the thoracic 4 and 5 vertebrae.    2.  Patient started chemotherapy with carboplatinum/-16 as of May 22, 2018.                             HISTORY OF PRESENT ILLNESS:  The patient is a 63 y.o. year old female who is here for an opinion about the above issue.    History of Present Illness patient is a 62-year-old female with metastatic cancer with a 9 cm large right upper lobe lung mass and a right hilar node as well as a left upper lobe lung mass which was 4 cm.  Patient has left adrenal gland metastasis and left neck node metastasis.  Patient underwent MRI of the brain and thoracic spine.  MRI brain is negative and thoracic spine MRI does not show evidence of any cord compression except involvement and impingement at the T4 and approved.    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  This is thought to be a small cell metastatic lung cancer.  Patient is here to discuss options of treatment.    I had a lengthy discussion about consideration of chemotherapy with carboplatin/-16.    Patient has seen Dr. Busby who plans to place her on a port this Friday.  I have contacted Dr. Busby to make sure he checks her CBC prior to obtaining the port as hopefully she will not be neutropenic at the time of port placement.  He knows not to do the procedure if she is neutropenic.        Oncologic history  patient is a 62-year-old female who had gone to the emergency room at [facility] on March 30, 2018 with intermittent severe sharp right flank pain and right back pain which started a week prior.  She also states that it worsens in the night and it lasted 20-30 minutes and was intermittent.  She had pain after eating.  In the emergency room  they did a CT of the abdomen pelvis which showed a 21 mm left adrenal mass.  A 1.5 cm nonobstructing right kidney stone.  Ultrasound of the gallbladder was done which showed no evidence of acute cholecystitis.    She also had a recent mammogram April 19, 2018 which was negative.  She came in with continued pain and went to the primary care physician.  She had a repeat abdominal ultrasound on April 23, 2018.  This showed that the liver and pancreas appeared normal the gallbladder appeared normal the spleen was normal.  She had calcifications in the [place].  There is a 15 mm focus hypoechoic lesion in the right mid pole of the kidney.  Adjacent to the upper pole of the left kidney there is a solid appearing mass which is 28 mm.  This corresponds to the adrenal lesion which was seen on the CT scan previously.    Patient  had a CT scan of the chest with contrast on May 2, 2018.  There is a large heterogeneous mass within the posterior  segment of the right upper lobe which measures approximately 8.8 x 5.6  cm and the craniocaudad span is approximately 6.6 cm. There is extension  into the posterior chest wall at the posterior right 4th-5th intercostal  space and there is extension into the right T4-5 neural foramen. There  are multiple nodules surrounding the lesion in the right upper lobe.  There is also an irregular 4 x 3 cm mass at the anterior aspect of the  left upper lobe. In addition, there is an 8 mm irregular opacity in the  left apical region and a 6 mm pulmonary nodule inferiorly in the left  upper lobe. There are 2 irregular reticular opacities in the right lower  lobe which both measure approximately 1 cm. There are no pleural or  pericardial effusions. There is ill-defined lymphadenopathy at the right  hilum. There are moderately extensive underlying emphysematous changes.  In the visualized upper abdomen, there is a 3.0 x 2.2 cm left adrenal  nodule. There is a faintly hyperenhancing 7 mm lesion within  the  anterior hepatic segment.    Patient continues to have right upper back pain.  She denies any bladder or bowel incontinence.  She denies any headache.  She does not have a tissue diagnosis.  She has also seen that she developed a left neck node which has increased within the last month.  She was referred to ENT Dr. Forbes.  The patient had an endoscopy which apparently was negative.  The neck node is reasonably large about 3 into 4 cm.  She will require core needle biopsy of the neck node which is easy access.  She did lose a lot of weight.  MRI brain is negative and thoracic spine MRI does not show evidence of any cord compression except involvement and impingement at the T4 and approved.    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  This is thought to be a small cell metastatic lung cancer.  Patient is here to discuss options of treatment.    I had a lengthy discussion about consideration of chemotherapy with carboplatin/-16.    Carboplatin and -16 started 5/22 2008.  Discussed with Dr. Robin Terry at [facility], following upfront chemotherapy he does not have any maintenance clinical trials.  I believe [facility] may have maintenance clinical trial.  If very good response could consider referral to Dr. Nieto.    Past Medical History:   Diagnosis Date   • Anxiety    • Atherosclerosis of abdominal aorta    • Basal cell carcinoma     Follows up with Dermatology annually, [place] with Dr. Antoine's office   • Bowen's disease of vulva    • Depression    • H/O Left adrenal mass 2018   • History of kidney stone 2018   • Left adrenal mass    • Lung mass     Bilateral   • Mitral valve prolapse    • Neuropathy    • Prolapse of mitral valve    • Right renal mass    • Rosacea    • Seasonal allergies    • Shingles    • Small cell carcinoma 05/14/2018    Small cell carcinoma of the lung with metastasis to the left neck node and the left  adrenal gland   • Substance abuse Smoking   • Thyroid nodule Current medical issues.   • Uterine mass         Past Surgical History:   Procedure Laterality Date   • BASAL CELL CARCINOMA EXCISION     • BREAST AUGMENTATION Bilateral 1980   • CATARACT EXTRACTION WITH INTRAOCULAR LENS IMPLANT     • EYE SURGERY     • RETINAL LASER PROCEDURE     • US GUIDED LYMPH NODE BIOPSY  5/14/2018    Ultrasound-guided biopsy of left neck mass-Dr. Roberto Calle, [facility]   • VULVECTOMY N/A     partial, due to Bowen's Disease        Current Outpatient Prescriptions on File Prior to Visit   Medication Sig Dispense Refill   • dexamethasone (DECADRON) 4 MG tablet Take 2 tablets the morning after chemotherapy and then 2 tablets, twice daily on days 3 and 4. 40 tablet 0   • Loratadine (CLARITIN CHILDRENS PO) Take 5 mL by mouth Daily.     • ondansetron (ZOFRAN) 4 MG tablet Take 1 tablet by mouth Every 8 (Eight) Hours As Needed for Nausea or Vomiting. 30 tablet 2     No current facility-administered medications on file prior to visit.         ALLERGIES:  No Known Allergies     Social History     Social History   • Marital status:      Occupational History   •       self-employed     Social History Main Topics   • Smoking status: Current Every Day Smoker     Packs/day: 1.50     Years: 48.00     Types: Cigarettes     Start date: 5/30/1971   • Smokeless tobacco: Never Used      Comment: started smoking at age 16   • Alcohol use Yes     4 - 6 Glasses of wine per week      Comment: 2 glasses of wine a few nights per week   • Drug use: No   • Sexual activity: No     Other Topics Concern   • Not on file     Social History Narrative    Works as a .  Lives at home with her daughter.          Family History   Problem Relation Age of Onset   • Other Mother         Cerebral hemorrhage   • Heart disease Father    • Thyroid cancer Sister    • No Known Problems Brother         Review of Systems   Constitutional: Positive for  "fatigue and unexpected weight change.   Respiratory: Negative for cough, chest tightness and shortness of breath.    Cardiovascular: Positive for chest pain. Negative for palpitations and leg swelling.   Gastrointestinal: Negative for abdominal pain, blood in stool, nausea and vomiting.   All other systems reviewed and are negative.     No change in her symptoms  Objective     Vitals:    05/22/18 1222   BP: 102/60   Pulse: 68   Resp: 14   Temp: 98.3 °F (36.8 °C)   SpO2: 97%  Comment: at rest   Weight: 40.2 kg (88 lb 9.6 oz)   Height: 163.5 cm (64.37\")   PainSc: 0-No pain     Current Status 5/22/2018   ECOG score 0       Physical Exam      GENERAL:  Well-developed, well-nourished in no acute distress. .  THROAT:  Oropharynx without lesions or exudates.  NECK:  Supple with good range of motion; no thyromegaly or masses, no JVD.  Patient has large left neck mass which is increasing in size about 4×5 cm  LYMPHATICS:  No cervical, supraclavicular, axillary or inguinal adenopathy.  CHEST:  Lungs clear to auscultation. Good airflow.  CARDIAC:  Regular rate and rhythm without murmurs, rubs or gallops. Normal S1,S2.  ABDOMEN:  Soft, nontender with no hepatosplenomegaly or masses.  EXTREMITIES:  No clubbing, cyanosis or edema.  NEUROLOGICAL:  Cranial Nerves II-XII grossly intact.  No focal neurological deficits.  PSYCHIATRIC:  Normal affect and mood.        Left Neck mass   Final Diagnosis   \"ULTRASOUND GUIDED LEFT NECK MASS BIOPSY\", NEEDLE BIOPSY:               NEUROENDOCRINE CARCINOMA WITH NECROSIS, PREDOMINANTLY SMALL CELL TYPE.                PENDING:  [facility] AS PER REQUEST.         Final Diagnosis   \"ULTRASOUND GUIDED LEFT NECK MASS BIOPSY\", NEEDLE BIOPSY:               NEUROENDOCRINE CARCINOMA WITH NECROSIS, PREDOMINANTLY SMALL CELL TYPE.                PENDING:  [facility] AS PER REQUEST.      THM/brb IHC/a/CMK     CPT CODES:  1.  05104, 49923, 88341 x6   Electronically " "signed by Roberto Bates MD on 5/15/2018 at 1426   Intradepartmental Consult    Dr. PIERRE Ash who concurs.      Gross Description    Received in formalin labeled \"left neck biopsy\" are multiple white tan needle core biopsies up to 1.9 cm long and less than 0.1 cm across.  The tissue is entirely submitted in a single block labeled 1A.  CC/USO/CMK/brb    Special Stains    Immunoperoxidase stains with appropriate controls are performed on block 1A with tumor cells showing the following reactivity:      CK 7:   positive  CK 20:  negative  AE I/3:    positive  TTF-1:    positive  Synaptophysin:   positive  Cd56:   positive  CD45:  negative   Microscopic Description        RECENT LABS:  Hematology WBC   Date Value Ref Range Status   05/22/2018 6.05 4.00 - 10.00 10*3/mm3 Final     RBC   Date Value Ref Range Status   05/22/2018 5.01 (H) 3.90 - 5.00 10*6/mm3 Final     Hemoglobin   Date Value Ref Range Status   05/22/2018 16.1 (H) 11.5 - 14.9 g/dL Final     Hematocrit   Date Value Ref Range Status   05/22/2018 48.1 (H) 34.0 - 45.0 % Final     Platelets   Date Value Ref Range Status   05/22/2018 297 150 - 375 10*3/mm3 Final          Assessment/Plan     1.  Newly diagnosed bilateral lung nodules, with a large 8 cm ×6 cm right upper lobe lung nodule with extension into the intercostal space between the fourth and fifth rib and also extending into the thoracic 4-5 neural foramina.  Patient has bilateral pulmonary nodular opacities which are suspicious for metastasis.  Patient also has a 3 cm left adrenal nodule suspicious for metastasis.  Further evaluation with a PET CT is recommended.  Patient has noticed a left neck nodule and is a highly high nodule which is growing very quickly in the last month.  Patient has seen ENT and endoscopy was negative.  She does have a significant amount of pain on the right chest wall area.  She does not have a tissue diagnosis and will give 1.  She has distant metastases to the adrenal " gland.  I reviewed both the CT scan .  Patient has extensive disease with involvement of the left adrenal gland and left neck node.  I have reviewed the pathology in length with the patient and is consistent with small cell consistent with a lung primary.  I did discuss with pathologist Dr. Roberto Bates and he does think that there is some intermediate appearing cells in addition to small cells with necrosis and that this is a lung primary.  I had a lengthy discussion about consideration of chemotherapy with carboplatinum -16.  I also reviewed the MRI of the brain and MRI of the spine with the patient.  MRI brain negative and MRI of the spine shows no impingement without any cord compression.  Patient has no neurological symptoms.    I have educated her on the side effects of chemotherapy with carboplatinum and -16 and we will plan to obtain a port placement and started chemotherapy next week.    · May 22, 2018, carboplatinum -16 started.  Plans to give 4-6 cycles of carboplatinum -16 with CT scan to be done after every 2 cycles.    3.  Anxiety and depression    Plan 1. Obtain port placement this Friday by Dr. Busby.    2.  Cycle 1 day 1 chemotherapy with carboplatinum/-16 to be done starting 5/22/2018    3.  Given frail patient will plan to see if Neulasta can be given on day 4    4.  Await [facility]  CD X testing on the tissue    5.  Follow-up with nurse practitioner in 1 week and with me in 2 weeks for toxicity check    Leyla Canada MD        Cc: Dr. Heath

## 2018-05-23 ENCOUNTER — INFUSION (OUTPATIENT)
Dept: ONCOLOGY | Facility: HOSPITAL | Age: 63
End: 2018-05-23

## 2018-05-23 DIAGNOSIS — C34.91 SMALL CELL CARCINOMA OF RIGHT LUNG (HCC): Primary | ICD-10-CM

## 2018-05-23 PROCEDURE — 63710000001 PROCHLORPERAZINE MALEATE PER 10 MG: Performed by: INTERNAL MEDICINE

## 2018-05-23 PROCEDURE — 96413 CHEMO IV INFUSION 1 HR: CPT | Performed by: INTERNAL MEDICINE

## 2018-05-23 PROCEDURE — 25010000002 ETOPOSIDE 100 MG/5ML SOLUTION 25 ML VIAL: Performed by: INTERNAL MEDICINE

## 2018-05-23 PROCEDURE — 25010000002 ETOPOSIDE 100 MG/5ML SOLUTION 5 ML VIAL: Performed by: INTERNAL MEDICINE

## 2018-05-23 RX ORDER — SODIUM CHLORIDE 9 MG/ML
250 INJECTION, SOLUTION INTRAVENOUS ONCE
Status: COMPLETED | OUTPATIENT
Start: 2018-05-23 | End: 2018-05-23

## 2018-05-23 RX ORDER — PROCHLORPERAZINE MALEATE 10 MG
10 TABLET ORAL ONCE
Status: COMPLETED | OUTPATIENT
Start: 2018-05-23 | End: 2018-05-23

## 2018-05-23 RX ADMIN — SODIUM CHLORIDE 250 ML: 9 INJECTION, SOLUTION INTRAVENOUS at 14:27

## 2018-05-23 RX ADMIN — PROCHLORPERAZINE MALEATE 10 MG: 10 TABLET, FILM COATED ORAL at 14:25

## 2018-05-23 RX ADMIN — ETOPOSIDE 140 MG: 20 INJECTION, SOLUTION, CONCENTRATE INTRAVENOUS at 14:50

## 2018-05-23 NOTE — PROGRESS NOTES
Per TIARRA Lopez pre-cert patient approved for Neulasta. Per verbal order Dr Canada order placed for Neulasta on body on Day 2 of treatment cycle

## 2018-05-24 ENCOUNTER — INFUSION (OUTPATIENT)
Dept: ONCOLOGY | Facility: HOSPITAL | Age: 63
End: 2018-05-24

## 2018-05-24 ENCOUNTER — TELEPHONE (OUTPATIENT)
Dept: GENERAL RADIOLOGY | Facility: HOSPITAL | Age: 63
End: 2018-05-24

## 2018-05-24 VITALS
BODY MASS INDEX: 15.76 KG/M2 | HEART RATE: 73 BPM | TEMPERATURE: 98.1 F | SYSTOLIC BLOOD PRESSURE: 116 MMHG | DIASTOLIC BLOOD PRESSURE: 66 MMHG | WEIGHT: 91.8 LBS

## 2018-05-24 DIAGNOSIS — C34.91 SMALL CELL CARCINOMA OF RIGHT LUNG (HCC): Primary | ICD-10-CM

## 2018-05-24 PROCEDURE — 63710000001 PROCHLORPERAZINE MALEATE PER 10 MG: Performed by: INTERNAL MEDICINE

## 2018-05-24 PROCEDURE — 96415 CHEMO IV INFUSION ADDL HR: CPT | Performed by: INTERNAL MEDICINE

## 2018-05-24 PROCEDURE — 96413 CHEMO IV INFUSION 1 HR: CPT | Performed by: INTERNAL MEDICINE

## 2018-05-24 PROCEDURE — 25010000002 ETOPOSIDE 100 MG/5ML SOLUTION 25 ML VIAL: Performed by: INTERNAL MEDICINE

## 2018-05-24 RX ORDER — PROCHLORPERAZINE MALEATE 10 MG
10 TABLET ORAL ONCE
Status: COMPLETED | OUTPATIENT
Start: 2018-05-24 | End: 2018-05-24

## 2018-05-24 RX ORDER — SODIUM CHLORIDE 9 MG/ML
250 INJECTION, SOLUTION INTRAVENOUS ONCE
Status: COMPLETED | OUTPATIENT
Start: 2018-05-24 | End: 2018-05-24

## 2018-05-24 RX ADMIN — SODIUM CHLORIDE 250 ML: 900 INJECTION, SOLUTION INTRAVENOUS at 15:30

## 2018-05-24 RX ADMIN — ETOPOSIDE 140 MG: 20 INJECTION, SOLUTION, CONCENTRATE INTRAVENOUS at 15:30

## 2018-05-24 RX ADMIN — PROCHLORPERAZINE MALEATE 10 MG: 10 TABLET, FILM COATED ORAL at 15:27

## 2018-05-24 NOTE — TELEPHONE ENCOUNTER
----- Message from Latonia Cavanaugh RN sent at 5/24/2018  4:04 PM EDT -----  Regarding: Neulasta injection  Patient needs apt for Neulasta injection tomorrow afternoon, approx 4 pm.  Thanks

## 2018-05-24 NOTE — PROGRESS NOTES
Patient refusing to wear Neulasta Onpro after tx today.  Patient having outpt mediport insertion tomorrow and has to do special shower tonight.  Did not want to do that with Onpro on.  Discussed with Dr. Canada.  Order received for patient to come in tomorrow for Neulasta injection  Message sent to schedule desk.  Discussed with patient.  Pt v/u.

## 2018-05-25 ENCOUNTER — APPOINTMENT (OUTPATIENT)
Dept: GENERAL RADIOLOGY | Facility: HOSPITAL | Age: 63
End: 2018-05-25

## 2018-05-25 ENCOUNTER — HOSPITAL ENCOUNTER (OUTPATIENT)
Facility: HOSPITAL | Age: 63
Setting detail: HOSPITAL OUTPATIENT SURGERY
Discharge: HOME | End: 2018-05-25
Attending: SURGERY | Admitting: SURGERY

## 2018-05-25 ENCOUNTER — ANESTHESIA EVENT (OUTPATIENT)
Dept: PERIOP | Facility: HOSPITAL | Age: 63
End: 2018-05-25

## 2018-05-25 ENCOUNTER — INFUSION (OUTPATIENT)
Dept: ONCOLOGY | Facility: HOSPITAL | Age: 63
End: 2018-05-25

## 2018-05-25 ENCOUNTER — ANESTHESIA (OUTPATIENT)
Dept: PERIOP | Facility: HOSPITAL | Age: 63
End: 2018-05-25

## 2018-05-25 ENCOUNTER — APPOINTMENT (OUTPATIENT)
Dept: GENERAL RADIOLOGY | Facility: HOSPITAL | Age: 63
End: 2018-05-25
Attending: SURGERY

## 2018-05-25 VITALS
TEMPERATURE: 98.6 F | HEART RATE: 59 BPM | DIASTOLIC BLOOD PRESSURE: 73 MMHG | HEIGHT: 64 IN | SYSTOLIC BLOOD PRESSURE: 128 MMHG | WEIGHT: 92.13 LBS | OXYGEN SATURATION: 92 % | RESPIRATION RATE: 16 BRPM | BODY MASS INDEX: 15.73 KG/M2

## 2018-05-25 DIAGNOSIS — C80.1 ADENOCARCINOMA (HCC): ICD-10-CM

## 2018-05-25 LAB
BASOPHILS # BLD AUTO: 0.01 10*3/MM3 (ref 0–0.2)
BASOPHILS NFR BLD AUTO: 0.1 % (ref 0–1.5)
DEPRECATED RDW RBC AUTO: 46.9 FL (ref 37–54)
EOSINOPHIL # BLD AUTO: 0.01 10*3/MM3 (ref 0–0.7)
EOSINOPHIL NFR BLD AUTO: 0.1 % (ref 0.3–6.2)
ERYTHROCYTE [DISTWIDTH] IN BLOOD BY AUTOMATED COUNT: 12.7 % (ref 11.7–13)
HCT VFR BLD AUTO: 40.3 % (ref 35.6–45.5)
HGB BLD-MCNC: 13 G/DL (ref 11.9–15.5)
IMM GRANULOCYTES # BLD: 0.02 10*3/MM3 (ref 0–0.03)
IMM GRANULOCYTES NFR BLD: 0.3 % (ref 0–0.5)
LYMPHOCYTES # BLD AUTO: 1.3 10*3/MM3 (ref 0.9–4.8)
LYMPHOCYTES NFR BLD AUTO: 17.3 % (ref 19.6–45.3)
MCH RBC QN AUTO: 32.3 PG (ref 26.9–32)
MCHC RBC AUTO-ENTMCNC: 32.3 G/DL (ref 32.4–36.3)
MCV RBC AUTO: 100.2 FL (ref 80.5–98.2)
MONOCYTES # BLD AUTO: 0.13 10*3/MM3 (ref 0.2–1.2)
MONOCYTES NFR BLD AUTO: 1.7 % (ref 5–12)
NEUTROPHILS # BLD AUTO: 6.03 10*3/MM3 (ref 1.9–8.1)
NEUTROPHILS NFR BLD AUTO: 80.5 % (ref 42.7–76)
PLATELET # BLD AUTO: 234 10*3/MM3 (ref 140–500)
PMV BLD AUTO: 10.6 FL (ref 6–12)
RBC # BLD AUTO: 4.02 10*6/MM3 (ref 3.9–5.2)
WBC NRBC COR # BLD: 7.5 10*3/MM3 (ref 4.5–10.7)

## 2018-05-25 PROCEDURE — 93005 ELECTROCARDIOGRAM TRACING: CPT | Performed by: SURGERY

## 2018-05-25 PROCEDURE — 85025 COMPLETE CBC W/AUTO DIFF WBC: CPT | Performed by: SURGERY

## 2018-05-25 PROCEDURE — 77001 FLUOROGUIDE FOR VEIN DEVICE: CPT | Performed by: SURGERY

## 2018-05-25 PROCEDURE — 71045 X-RAY EXAM CHEST 1 VIEW: CPT

## 2018-05-25 PROCEDURE — 77001 FLUOROGUIDE FOR VEIN DEVICE: CPT

## 2018-05-25 PROCEDURE — C1788 PORT, INDWELLING, IMP: HCPCS | Performed by: SURGERY

## 2018-05-25 PROCEDURE — 25010000003 CEFAZOLIN IN DEXTROSE 2-4 GM/100ML-% SOLUTION: Performed by: SURGERY

## 2018-05-25 PROCEDURE — 25010000002 FENTANYL CITRATE (PF) 100 MCG/2ML SOLUTION: Performed by: NURSE ANESTHETIST, CERTIFIED REGISTERED

## 2018-05-25 PROCEDURE — 25010000002 PROPOFOL 10 MG/ML EMULSION: Performed by: NURSE ANESTHETIST, CERTIFIED REGISTERED

## 2018-05-25 PROCEDURE — 36561 INSERT TUNNELED CV CATH: CPT | Performed by: SURGERY

## 2018-05-25 PROCEDURE — 25010000002 MIDAZOLAM PER 1 MG: Performed by: ANESTHESIOLOGY

## 2018-05-25 PROCEDURE — 25010000002 PEGFILGRASTIM 6 MG/0.6ML SOLUTION PREFILLED SYRINGE: Performed by: INTERNAL MEDICINE

## 2018-05-25 PROCEDURE — 93010 ELECTROCARDIOGRAM REPORT: CPT | Performed by: INTERNAL MEDICINE

## 2018-05-25 PROCEDURE — 96372 THER/PROPH/DIAG INJ SC/IM: CPT | Performed by: INTERNAL MEDICINE

## 2018-05-25 DEVICE — POWERPORT CLEARVUE IMPLANTABLE PORT WITH ATTACHABLE 8F POLYURETHANE OPEN-ENDED SINGLE-LUMEN VENOUS CATHETER INTERMEDIATE KIT
Type: IMPLANTABLE DEVICE | Status: FUNCTIONAL
Brand: POWERPORT CLEARVUE

## 2018-05-25 RX ORDER — OXYCODONE HYDROCHLORIDE AND ACETAMINOPHEN 5; 325 MG/1; MG/1
1 TABLET ORAL ONCE AS NEEDED
Status: DISCONTINUED | OUTPATIENT
Start: 2018-05-25 | End: 2018-05-25 | Stop reason: HOSPADM

## 2018-05-25 RX ORDER — LABETALOL HYDROCHLORIDE 5 MG/ML
5 INJECTION, SOLUTION INTRAVENOUS
Status: DISCONTINUED | OUTPATIENT
Start: 2018-05-25 | End: 2018-05-25 | Stop reason: HOSPADM

## 2018-05-25 RX ORDER — MIDAZOLAM HYDROCHLORIDE 1 MG/ML
1 INJECTION INTRAMUSCULAR; INTRAVENOUS
Status: DISCONTINUED | OUTPATIENT
Start: 2018-05-25 | End: 2018-05-25 | Stop reason: HOSPADM

## 2018-05-25 RX ORDER — ACETAMINOPHEN 325 MG/1
650 TABLET ORAL ONCE AS NEEDED
Status: DISCONTINUED | OUTPATIENT
Start: 2018-05-25 | End: 2018-05-25 | Stop reason: HOSPADM

## 2018-05-25 RX ORDER — MIDAZOLAM HYDROCHLORIDE 1 MG/ML
2 INJECTION INTRAMUSCULAR; INTRAVENOUS
Status: DISCONTINUED | OUTPATIENT
Start: 2018-05-25 | End: 2018-05-25 | Stop reason: HOSPADM

## 2018-05-25 RX ORDER — FAMOTIDINE 10 MG/ML
20 INJECTION, SOLUTION INTRAVENOUS ONCE
Status: COMPLETED | OUTPATIENT
Start: 2018-05-25 | End: 2018-05-25

## 2018-05-25 RX ORDER — PROPOFOL 10 MG/ML
VIAL (ML) INTRAVENOUS AS NEEDED
Status: DISCONTINUED | OUTPATIENT
Start: 2018-05-25 | End: 2018-05-25 | Stop reason: SURG

## 2018-05-25 RX ORDER — NALOXONE HCL 0.4 MG/ML
0.2 VIAL (ML) INJECTION AS NEEDED
Status: DISCONTINUED | OUTPATIENT
Start: 2018-05-25 | End: 2018-05-25 | Stop reason: HOSPADM

## 2018-05-25 RX ORDER — ONDANSETRON 2 MG/ML
4 INJECTION INTRAMUSCULAR; INTRAVENOUS ONCE AS NEEDED
Status: DISCONTINUED | OUTPATIENT
Start: 2018-05-25 | End: 2018-05-25 | Stop reason: HOSPADM

## 2018-05-25 RX ORDER — PROPOFOL 10 MG/ML
VIAL (ML) INTRAVENOUS CONTINUOUS PRN
Status: DISCONTINUED | OUTPATIENT
Start: 2018-05-25 | End: 2018-05-25 | Stop reason: SURG

## 2018-05-25 RX ORDER — FLUMAZENIL 0.1 MG/ML
0.2 INJECTION INTRAVENOUS AS NEEDED
Status: DISCONTINUED | OUTPATIENT
Start: 2018-05-25 | End: 2018-05-25 | Stop reason: HOSPADM

## 2018-05-25 RX ORDER — MAGNESIUM HYDROXIDE 1200 MG/15ML
LIQUID ORAL AS NEEDED
Status: DISCONTINUED | OUTPATIENT
Start: 2018-05-25 | End: 2018-05-25 | Stop reason: HOSPADM

## 2018-05-25 RX ORDER — LIDOCAINE HYDROCHLORIDE 20 MG/ML
INJECTION, SOLUTION INFILTRATION; PERINEURAL AS NEEDED
Status: DISCONTINUED | OUTPATIENT
Start: 2018-05-25 | End: 2018-05-25 | Stop reason: SURG

## 2018-05-25 RX ORDER — CEFAZOLIN SODIUM 2 G/100ML
2 INJECTION, SOLUTION INTRAVENOUS ONCE
Status: COMPLETED | OUTPATIENT
Start: 2018-05-25 | End: 2018-05-25

## 2018-05-25 RX ORDER — SODIUM CHLORIDE 0.9 % (FLUSH) 0.9 %
1-10 SYRINGE (ML) INJECTION AS NEEDED
Status: DISCONTINUED | OUTPATIENT
Start: 2018-05-25 | End: 2018-05-25 | Stop reason: HOSPADM

## 2018-05-25 RX ORDER — HYDROCODONE BITARTRATE AND ACETAMINOPHEN 5; 325 MG/1; MG/1
1 TABLET ORAL EVERY 6 HOURS PRN
Qty: 15 TABLET | Refills: 0 | Status: SHIPPED | OUTPATIENT
Start: 2018-05-25 | End: 2018-06-05

## 2018-05-25 RX ORDER — HYDRALAZINE HYDROCHLORIDE 20 MG/ML
5 INJECTION INTRAMUSCULAR; INTRAVENOUS
Status: DISCONTINUED | OUTPATIENT
Start: 2018-05-25 | End: 2018-05-25 | Stop reason: HOSPADM

## 2018-05-25 RX ORDER — ALBUTEROL SULFATE 2.5 MG/3ML
2.5 SOLUTION RESPIRATORY (INHALATION) ONCE AS NEEDED
Status: DISCONTINUED | OUTPATIENT
Start: 2018-05-25 | End: 2018-05-25 | Stop reason: HOSPADM

## 2018-05-25 RX ORDER — LIDOCAINE HYDROCHLORIDE 10 MG/ML
0.5 INJECTION, SOLUTION EPIDURAL; INFILTRATION; INTRACAUDAL; PERINEURAL ONCE AS NEEDED
Status: DISCONTINUED | OUTPATIENT
Start: 2018-05-25 | End: 2018-05-25 | Stop reason: HOSPADM

## 2018-05-25 RX ORDER — SODIUM CHLORIDE, SODIUM LACTATE, POTASSIUM CHLORIDE, CALCIUM CHLORIDE 600; 310; 30; 20 MG/100ML; MG/100ML; MG/100ML; MG/100ML
9 INJECTION, SOLUTION INTRAVENOUS CONTINUOUS
Status: DISCONTINUED | OUTPATIENT
Start: 2018-05-25 | End: 2018-05-25 | Stop reason: HOSPADM

## 2018-05-25 RX ORDER — FENTANYL CITRATE 50 UG/ML
50 INJECTION, SOLUTION INTRAMUSCULAR; INTRAVENOUS
Status: DISCONTINUED | OUTPATIENT
Start: 2018-05-25 | End: 2018-05-25 | Stop reason: HOSPADM

## 2018-05-25 RX ORDER — FENTANYL CITRATE 50 UG/ML
INJECTION, SOLUTION INTRAMUSCULAR; INTRAVENOUS AS NEEDED
Status: DISCONTINUED | OUTPATIENT
Start: 2018-05-25 | End: 2018-05-25 | Stop reason: SURG

## 2018-05-25 RX ADMIN — SODIUM CHLORIDE, POTASSIUM CHLORIDE, SODIUM LACTATE AND CALCIUM CHLORIDE 9 ML/HR: 600; 310; 30; 20 INJECTION, SOLUTION INTRAVENOUS at 10:01

## 2018-05-25 RX ADMIN — FENTANYL CITRATE 25 MCG: 50 INJECTION INTRAMUSCULAR; INTRAVENOUS at 11:35

## 2018-05-25 RX ADMIN — PROPOFOL 40 MG: 10 INJECTION, EMULSION INTRAVENOUS at 11:42

## 2018-05-25 RX ADMIN — FAMOTIDINE 20 MG: 10 INJECTION INTRAVENOUS at 10:01

## 2018-05-25 RX ADMIN — MIDAZOLAM 1 MG: 1 INJECTION INTRAMUSCULAR; INTRAVENOUS at 10:02

## 2018-05-25 RX ADMIN — LIDOCAINE HYDROCHLORIDE 60 MG: 20 INJECTION, SOLUTION INFILTRATION; PERINEURAL at 11:42

## 2018-05-25 RX ADMIN — CEFAZOLIN SODIUM 2 G: 2 INJECTION, SOLUTION INTRAVENOUS at 11:35

## 2018-05-25 RX ADMIN — FENTANYL CITRATE 25 MCG: 50 INJECTION INTRAMUSCULAR; INTRAVENOUS at 11:40

## 2018-05-25 RX ADMIN — PROPOFOL 100 MCG/KG/MIN: 10 INJECTION, EMULSION INTRAVENOUS at 11:42

## 2018-05-25 RX ADMIN — PEGFILGRASTIM 6 MG: 6 INJECTION SUBCUTANEOUS at 16:30

## 2018-05-25 NOTE — ANESTHESIA PREPROCEDURE EVALUATION
Anesthesia Evaluation     Patient summary reviewed and Nursing notes reviewed   NPO Solid Status: > 8 hours  NPO Liquid Status: > 2 hours           Airway   Mallampati: II  no difficulty expected  Dental - normal exam     Pulmonary     breath sounds clear to auscultation  (+) a smoker Current Smoked day of surgery, lung cancer,   Cardiovascular     ECG reviewed  Rhythm: regular  Rate: normal    (+) valvular problems/murmurs, PVD,       Neuro/Psych  GI/Hepatic/Renal/Endo      Musculoskeletal     Abdominal    Substance History      OB/GYN          Other      history of cancer                  Anesthesia Plan    ASA 3     MAC     intravenous induction   Anesthetic plan and risks discussed with patient.

## 2018-05-25 NOTE — H&P (VIEW-ONLY)
Cc: Lung cancer    History of presenting illness:   This is a nice, 63-year-old lady recently diagnosed with small cell carcinoma of the lung who presents with the need for port placement.  She initially presented with some right-sided abdominal pain and had a CT which demonstrated a left adrenal mass and this was subsequently worked up.  Eventually she had a biopsy of a node in her neck which demonstrated neuroendocrine carcinoma predominantly small cell type and full workup was suggestive of a lung cancer primary.  She reports breathing comfortably.  She has minimal abdominal pain at this point.  No radiation of any pain.  She has had some weight loss.    Past Medical History: Anxiety, mitral valve prolapse, rosacea    Past Surgical History: Breast augmentation, ultrasound-guided lymph node biopsy, partial vulvectomy    Medications: Doxycycline, Claritin    Allergies: None known    Social History: Patient is a long-term cigarette smoker proximally since age 16.  Admits to drinking a couple of alcoholic beverages per week.    Family History: Heart disease in her father, thyroid cancer in her sister    Review of Systems:  Constitutional: Negative for fever, positive for fatigue and weight loss  Neck: no swollen glands or dysphagia or odynophagia  Respiratory: negative for SOB, cough, hemoptysis or wheezing  Cardiovascular: negative for chest pain, palpitations or peripheral edema  Gastrointestinal: No abdominal pain currently no blood in stool, negative for nausea or vomiting      Physical Exam:    General: alert and oriented, appropriate, no acute distress  Neck: Supple without lymphadenopathy or thyromegaly, trachea is in the midline  Respiratory: Lungs are clear bilaterally without wheezing, no use of accessory muscles is noted  Cardiovascular: Regular rate and rhythm without murmur, no peripheral edema  Gastrointestinal: Soft, benign, no hernia or hepatosplenomegaly    Laboratory data: White blood cell count  7.2.  Hemoglobin 14.7.  Platelets 269.  Pathology report reviewed.  Ultrasound guided left neck mass with neuroendocrine carcinoma with necrosis, probably small cell type.    Imaging data: CT demonstrates 9 cm right upper lobe mass.  4 cm left upper lobe mass.      Assessment and plan:   Metastatic small cell lung cancer  Plan for Keseuq-l-Kmwa placement for chemotherapy.  Due to the thin and frail nature of the patient I have recommended a right internal jugular vein approach.  I think she would be at high risk for pneumothorax with a subclavian approach.  Risks and benefits including bleeding, pneumothorax, infection, device failure discussed with patient.      Jelani Granger MD, FACS  General, Minimally Invasive and Endoscopic Surgery  [facility]    [address], [address]  [place], [place], [address]  P: [phone]  F: [phone]

## 2018-05-25 NOTE — PERIOPERATIVE NURSING NOTE
Dr. Granger at bedside to speak with patient and family. May shower Sunday. No follow up needed at this time.

## 2018-05-25 NOTE — OP NOTE
Operative Note :   MD Tatiana Moratayajuan DE PAZ Evelin  1955    Procedure Date: 05/25/18    Pre-op Diagnosis:  Small cell lung cancer     Post-Op Diagnosis:  Same    Procedure:   · Right internal jugular approach infusaport placement    Surgeon: Jelani Granger MD    Assistant: Anna SENIOR    Anesthesia:  General (general endotracheal tube)    EBL:   minimal    Specimens:   none    Indications:  · 63 year old with lung cancer    Findings:   · none    Recommendations:   · Routine incisional care    Description of procedure:    After obtaining informed consent, patient was brought to the operating room and sedated.  Chest and neck were sterilely prepped and draped.  The area overlying the right internal jugular vein was anesthetized and I then easily gain access to the right internal jugular vein with a single stick.  A wire was passed through the needle and under fluoroscopic guidance directed to the superior vena cava.  The wire was secured in place.  I then marked out a spot for a pocket on the right anterior chest wall below the clavicle and then made an incision through the skin dissected down onto the pectoralis fascia.  I then bluntly dissected in the subcutaneous space crating enough space for the port to be placed.  I then enlarged the stick site at the neck and passed the vein dilator and sheath over the wire under fluoroscopic guidance.  The wire was then removed along with the vein dilator.  I then tunneled up from the pocket up to the stick site with the tunneler and then passed the catheter through the tunnel and then through the sheath under fluoroscopic guidance and directed it towards the superior vena cava.  The peel-away sheath was removed.  The catheter was cut to the appropriate length and attached to the port.  The port was sewn onto the chest wall with two 3-0 Prolene sutures.  The port was then flushed and then locked with the concentrated heparin solution.  The skin incision was  closed with interrupted 3-0 Vicryl's and then with skin glue.  Sterile dressings were applied.  The patient tolerated the procedure well.    Jelani Granger MD  General and Endoscopic Surgery  [facility]    [address], [address]  [place], [place], [address]  P: [phone]  F: [phone]

## 2018-05-25 NOTE — ANESTHESIA POSTPROCEDURE EVALUATION
"Patient: Sujata Waters    Procedure Summary     Date:  05/25/18 Room / Location:  [facility] OR  / [facility] MAIN OR    Anesthesia Start:  1129 Anesthesia Stop:  1218    Procedure:  INSERTION OF PORTACATH (Right ) Diagnosis:       Adenocarcinoma      (Adenocarcinoma [C80.1])    Surgeon:  Jelani Granger MD Provider:  Dimitri Bernal MD    Anesthesia Type:  MAC ASA Status:  3          Anesthesia Type: MAC  Last vitals  BP   142/72 (05/25/18 1235)   Temp   37 °C (98.6 °F) (05/25/18 1215)   Pulse   69 (05/25/18 1235)   Resp   16 (05/25/18 1235)     SpO2   93 % (05/25/18 1235)     Post Anesthesia Care and Evaluation    Patient location during evaluation: bedside  Patient participation: complete - patient participated  Level of consciousness: sleepy but conscious  Pain score: 0  Pain management: adequate  Airway patency: patent  Anesthetic complications: No anesthetic complications    Cardiovascular status: acceptable  Respiratory status: acceptable  Hydration status: acceptable    Comments: /72   Pulse 69   Temp 37 °C (98.6 °F) (Oral)   Resp 16   Ht 162.6 cm (64\")   Wt 41.8 kg (92 lb 2 oz)   SpO2 93%   BMI 15.81 kg/m²         "

## 2018-05-30 ENCOUNTER — OFFICE VISIT (OUTPATIENT)
Dept: ONCOLOGY | Facility: CLINIC | Age: 63
End: 2018-05-30

## 2018-05-30 ENCOUNTER — LAB (OUTPATIENT)
Dept: LAB | Facility: HOSPITAL | Age: 63
End: 2018-05-30

## 2018-05-30 VITALS
RESPIRATION RATE: 14 BRPM | WEIGHT: 86.4 LBS | DIASTOLIC BLOOD PRESSURE: 60 MMHG | SYSTOLIC BLOOD PRESSURE: 112 MMHG | HEART RATE: 63 BPM | TEMPERATURE: 99.3 F | HEIGHT: 64 IN | BODY MASS INDEX: 14.75 KG/M2 | OXYGEN SATURATION: 98 %

## 2018-05-30 DIAGNOSIS — C34.91 SMALL CELL CARCINOMA OF RIGHT LUNG (HCC): Primary | ICD-10-CM

## 2018-05-30 DIAGNOSIS — F41.9 ANXIETY: ICD-10-CM

## 2018-05-30 DIAGNOSIS — C34.91 SMALL CELL CARCINOMA OF RIGHT LUNG (HCC): ICD-10-CM

## 2018-05-30 DIAGNOSIS — C79.9 METASTATIC CANCER (HCC): ICD-10-CM

## 2018-05-30 DIAGNOSIS — T45.1X5A CHEMOTHERAPY-INDUCED NAUSEA: ICD-10-CM

## 2018-05-30 DIAGNOSIS — R11.0 CHEMOTHERAPY-INDUCED NAUSEA: ICD-10-CM

## 2018-05-30 LAB
ALBUMIN SERPL-MCNC: 3.9 G/DL (ref 3.5–5.2)
ALBUMIN/GLOB SERPL: 1.2 G/DL (ref 1.1–2.4)
ALP SERPL-CCNC: 113 U/L (ref 38–116)
ALT SERPL W P-5'-P-CCNC: 20 U/L (ref 0–33)
ANION GAP SERPL CALCULATED.3IONS-SCNC: 11.7 MMOL/L
AST SERPL-CCNC: 25 U/L (ref 0–32)
BASOPHILS # BLD AUTO: 0.03 10*3/MM3 (ref 0–0.1)
BASOPHILS NFR BLD AUTO: 0.5 % (ref 0–1.1)
BILIRUB SERPL-MCNC: 0.2 MG/DL (ref 0.1–1.2)
BUN BLD-MCNC: 17 MG/DL (ref 6–20)
BUN/CREAT SERPL: 34 (ref 7.3–30)
CALCIUM SPEC-SCNC: 9.4 MG/DL (ref 8.5–10.2)
CHLORIDE SERPL-SCNC: 93 MMOL/L (ref 98–107)
CO2 SERPL-SCNC: 29.3 MMOL/L (ref 22–29)
CREAT BLD-MCNC: 0.5 MG/DL (ref 0.6–1.1)
DEPRECATED RDW RBC AUTO: 43.9 FL (ref 37–49)
EOSINOPHIL # BLD AUTO: 0.09 10*3/MM3 (ref 0–0.36)
EOSINOPHIL NFR BLD AUTO: 1.6 % (ref 1–5)
ERYTHROCYTE [DISTWIDTH] IN BLOOD BY AUTOMATED COUNT: 12.1 % (ref 11.7–14.5)
GFR SERPL CREATININE-BSD FRML MDRD: 124 ML/MIN/1.73
GLOBULIN UR ELPH-MCNC: 3.3 GM/DL (ref 1.8–3.5)
GLUCOSE BLD-MCNC: 147 MG/DL (ref 74–124)
HCT VFR BLD AUTO: 42.5 % (ref 34–45)
HGB BLD-MCNC: 13.9 G/DL (ref 11.5–14.9)
IMM GRANULOCYTES # BLD: 0.11 10*3/MM3 (ref 0–0.03)
IMM GRANULOCYTES NFR BLD: 2 % (ref 0–0.5)
LYMPHOCYTES # BLD AUTO: 1.05 10*3/MM3 (ref 1–3.5)
LYMPHOCYTES NFR BLD AUTO: 18.9 % (ref 20–49)
MCH RBC QN AUTO: 32 PG (ref 27–33)
MCHC RBC AUTO-ENTMCNC: 32.7 G/DL (ref 32–35)
MCV RBC AUTO: 97.7 FL (ref 83–97)
MONOCYTES # BLD AUTO: 0.69 10*3/MM3 (ref 0.25–0.8)
MONOCYTES NFR BLD AUTO: 12.4 % (ref 4–12)
NEUTROPHILS # BLD AUTO: 3.58 10*3/MM3 (ref 1.5–7)
NEUTROPHILS NFR BLD AUTO: 64.6 % (ref 39–75)
NRBC BLD MANUAL-RTO: 0 /100 WBC (ref 0–0)
PLATELET # BLD AUTO: 176 10*3/MM3 (ref 150–375)
PMV BLD AUTO: 10.1 FL (ref 8.9–12.1)
POTASSIUM BLD-SCNC: 3.9 MMOL/L (ref 3.5–4.7)
PROT SERPL-MCNC: 7.2 G/DL (ref 6.3–8)
RBC # BLD AUTO: 4.35 10*6/MM3 (ref 3.9–5)
SODIUM BLD-SCNC: 134 MMOL/L (ref 134–145)
WBC NRBC COR # BLD: 5.55 10*3/MM3 (ref 4–10)

## 2018-05-30 PROCEDURE — 99213 OFFICE O/P EST LOW 20 MIN: CPT | Performed by: NURSE PRACTITIONER

## 2018-05-30 PROCEDURE — 85025 COMPLETE CBC W/AUTO DIFF WBC: CPT

## 2018-05-30 PROCEDURE — 80053 COMPREHEN METABOLIC PANEL: CPT

## 2018-05-30 PROCEDURE — 36415 COLL VENOUS BLD VENIPUNCTURE: CPT | Performed by: INTERNAL MEDICINE

## 2018-05-30 NOTE — PROGRESS NOTES
Subjective     REASON FOR CONSULTATION:   1.  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    2.  Patient started chemotherapy with carboplatin/-16 as of May 22, 2018.                       HISTORY OF PRESENT ILLNESS:  The patient is a 63 y.o. year old female who is here for an opinion about the above issue.    History of Present Illness    The patient returns today, 5/30/2018 for toxicity check.  She initiated chemotherapy 5/22/2018 with carboplatin -16.  Thankfully, she reports tolerating chemotherapy reasonably well.  She denies nausea or vomiting.  She denies changes in her bowels.  She did struggle with having her port placed last Friday, 5/25/2018.  She reports significant discomfort in her right shoulder and chest wall.  She is very thin, therefore her port protrudes which is irritating to her seatbelt and clothing. She is using Tylenol as needed for pain which she finds beneficial.   She denies fevers or chills, signs or symptoms of bleeding.  She denies worsening shortness of breath or chest pain.  She denies new neuropathy.      Past Medical History:   Diagnosis Date   • Anxiety    • Basal cell carcinoma     Follows up with Dermatology annually, [place] with Dr. Antoine's office   • Bowen's disease of vulva    • History of kidney stone 2018   • Left adrenal mass    • Lung cancer    • Lung mass     Bilateral   • Mitral valve prolapse    • Right renal mass    • Rosacea    • Seasonal allergies    • Shingles 2014   • Small cell carcinoma 05/14/2018    Small cell carcinoma of the lung with metastasis to the left neck node and the left adrenal gland   • Uterine mass      Past Surgical History:   Procedure Laterality Date   • BASAL CELL CARCINOMA EXCISION     • BREAST AUGMENTATION Bilateral 1980   • CATARACT EXTRACTION WITH INTRAOCULAR LENS IMPLANT     •  EYE SURGERY      macular hole surgically closed/cataract removal and implant   • RETINAL LASER PROCEDURE     • US GUIDED LYMPH NODE BIOPSY  5/14/2018    Ultrasound-guided biopsy of left neck mass-Dr. Roberto Calle, [facility]   • VENOUS ACCESS DEVICE (PORT) INSERTION Right 5/25/2018    Procedure: INSERTION OF PORTACATH;  Surgeon: Jelani Granger MD;  Location: [facility]  Service: General   • VULVECTOMY N/A     partial, due to Bowen's Disease       Oncologic history  patient is a 62-year-old female who had gone to the emergency room at [facility] on March 30, 2018 with intermittent severe sharp right flank pain and right back pain which started a week prior.  She also states that it worsens in the night and it lasted 20-30 minutes and was intermittent.  She had pain after eating.  In the emergency room they did a CT of the abdomen pelvis which showed a 21 mm left adrenal mass.  A 1.5 cm nonobstructing right kidney stone.  Ultrasound of the gallbladder was done which showed no evidence of acute cholecystitis.    She also had a recent mammogram April 19, 2018 which was negative.  She came in with continued pain and went to the primary care physician.  She had a repeat abdominal ultrasound on April 23, 2018.  This showed that the liver and pancreas appeared normal the gallbladder appeared normal the spleen was normal.  She had calcifications in the [place].  There is a 15 mm focus hypoechoic lesion in the right mid pole of the kidney.  Adjacent to the upper pole of the left kidney there is a solid appearing mass which is 28 mm.  This corresponds to the adrenal lesion which was seen on the CT scan previously.    Patient  had a CT scan of the chest with contrast on May 2, 2018.  There is a large heterogeneous mass within the posterior  segment of the right upper lobe which measures approximately 8.8 x 5.6  cm and the craniocaudad span is approximately 6.6 cm. There is extension  into the posterior chest wall at the  posterior right 4th-5th intercostal  space and there is extension into the right T4-5 neural foramen. There  are multiple nodules surrounding the lesion in the right upper lobe.  There is also an irregular 4 x 3 cm mass at the anterior aspect of the  left upper lobe. In addition, there is an 8 mm irregular opacity in the  left apical region and a 6 mm pulmonary nodule inferiorly in the left  upper lobe. There are 2 irregular reticular opacities in the right lower  lobe which both measure approximately 1 cm. There are no pleural or  pericardial effusions. There is ill-defined lymphadenopathy at the right  hilum. There are moderately extensive underlying emphysematous changes.  In the visualized upper abdomen, there is a 3.0 x 2.2 cm left adrenal  nodule. There is a faintly hyperenhancing 7 mm lesion within the  anterior hepatic segment.    Patient continues to have right upper back pain.  She denies any bladder or bowel incontinence.  She denies any headache.  She does not have a tissue diagnosis.  She has also seen that she developed a left neck node which has increased within the last month.  She was referred to ENT Dr. Forbes.  The patient had an endoscopy which apparently was negative.  The neck node is reasonably large about 3 into 4 cm.  She will require core needle biopsy of the neck node which is easy access.  She did lose a lot of weight.  MRI brain is negative and thoracic spine MRI does not show evidence of any cord compression except involvement and impingement at the T4 and approved.    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  This is thought to be a small cell metastatic lung cancer.  Patient is here to discuss options of treatment.    I had a lengthy discussion about consideration of chemotherapy with carboplatin/-16.    Carboplatin and -16 started 5/22 2008.  Discussed with Dr. Robin Terry at [facility], following upfront  chemotherapy he does not have any maintenance clinical trials.  I believe [facility] may have maintenance clinical trial.  If very good response could consider referral to Dr. Nieto.    Current Outpatient Prescriptions on File Prior to Visit   Medication Sig Dispense Refill   • dexamethasone (DECADRON) 4 MG tablet Take 2 tablets the morning after chemotherapy and then 2 tablets, twice daily on days 3 and 4. 40 tablet 0   • HYDROcodone-acetaminophen (NORCO) 5-325 MG per tablet Take 1 tablet by mouth Every 6 (Six) Hours As Needed for Moderate Pain . 15 tablet 0   • Loratadine (CLARITIN CHILDRENS PO) Take 5 mL by mouth Daily.     • ondansetron (ZOFRAN) 4 MG tablet Take 1 tablet by mouth Every 8 (Eight) Hours As Needed for Nausea or Vomiting. 30 tablet 2   • ondansetron (ZOFRAN) 8 MG tablet Take 1 tablet by mouth 3 (Three) Times a Day As Needed for Nausea or Vomiting. 30 tablet 5     No current facility-administered medications on file prior to visit.         ALLERGIES:  No Known Allergies     Social History     Social History   • Marital status:      Occupational History   •       self-employed     Social History Main Topics   • Smoking status: Current Every Day Smoker     Packs/day: 1.50     Years: 48.00     Types: Cigarettes     Start date: 5/30/1971   • Smokeless tobacco: Never Used      Comment: started smoking at age 16   • Alcohol use Yes     4 - 6 Glasses of wine per week      Comment: 2 glasses of wine a few nights per week   • Drug use: No   • Sexual activity: No     Other Topics Concern   • Not on file     Social History Narrative    Works as a .  Lives at home with her daughter.          Family History   Problem Relation Age of Onset   • Other Mother         Cerebral hemorrhage   • Heart disease Father    • Thyroid cancer Sister    • No Known Problems Brother    • Malig Hyperthermia Neg Hx       I have reviewed the patient's medical history in detail and updated  "the computerized patient record.    Review of Systems   Constitutional: Positive for fatigue and unexpected weight change. Negative for chills and fever.   HENT: Negative for mouth sores and sore throat.    Eyes: Negative for visual disturbance.   Respiratory: Negative for cough, chest tightness and shortness of breath.    Cardiovascular: Negative for chest pain, palpitations and leg swelling.   Gastrointestinal: Negative for abdominal pain, blood in stool, diarrhea, nausea and vomiting.   Genitourinary: Negative for dysuria and frequency.   Neurological: Negative for dizziness and weakness.   Hematological: Does not bruise/bleed easily.   Psychiatric/Behavioral: The patient is not nervous/anxious.    All other systems reviewed and are negative.      Objective     Vitals:    05/30/18 1405   BP: 112/60   Pulse: 63   Resp: 14   Temp: 99.3 °F (37.4 °C)   SpO2: 98%  Comment: at rest   Weight: 39.2 kg (86 lb 6.4 oz)   Height: 163.4 cm (64.33\")   PainSc: 0-No pain     Current Status 5/30/2018   ECOG score 0       Physical Exam    GENERAL:  Well-developed, well-nourished in no acute distress. Bruise on right forearm from previous IV stick.  THROAT:  Oropharynx without lesions or exudates.  NECK:  Patient has large left neck mass, Previously measuring 4 x 5 cm, though I did not measure this today.  LYMPHATICS:  No cervical, supraclavicular adenopathy.  CHEST:  Lungs clear to auscultation. Good airflow.  Right chest port is clearly visible, present in the upper chest, the patient is very thin, without swelling or signs of infection, no erythema or warmth.  CARDIAC:  Regular rate and rhythm without murmurs, rubs or gallops. Normal S1,S2.  ABDOMEN:  Soft, nontender with no hepatosplenomegaly or masses.  EXTREMITIES:  No clubbing, cyanosis or edema.  NEUROLOGICAL:  Cranial Nerves II-XII grossly intact.  No focal neurological deficits.  PSYCHIATRIC:  Normal affect and mood.      RECENT LABS:    Results from last 7 days  Lab " Units 05/30/18  1327 05/25/18  0923   WBC 10*3/mm3 5.55 7.50   NEUTROS ABS 10*3/mm3 3.58 6.03   HEMOGLOBIN g/dL 13.9 13.0   HEMATOCRIT % 42.5 40.3   PLATELETS 10*3/mm3 176 234       Results from last 7 days  Lab Units 05/30/18  1327   SODIUM mmol/L 134   POTASSIUM mmol/L 3.9   CHLORIDE mmol/L 93*   CO2 mmol/L 29.3*   BUN mg/dL 17   CREATININE mg/dL 0.50*   CALCIUM mg/dL 9.4   ALBUMIN g/dL 3.90   BILIRUBIN mg/dL 0.2   ALK PHOS U/L 113   ALT (SGPT) U/L 20   AST (SGOT) U/L 25   GLUCOSE mg/dL 147*           Assessment/Plan     1. Extensive stage small cell lung cancer.  Newly diagnosed bilateral lung nodules, with a large 8 cm ×6 cm right upper lobe lung nodule with extension into the intercostal space between the fourth and fifth rib and also extending into the thoracic 4-5 neural foramina.  Patient has bilateral pulmonary nodular opacities which are suspicious for metastasis.  Patient also has a 3 cm left adrenal nodule suspicious for metastasis.  Further evaluation with a PET CT is recommended.  Patient has noticed a left neck nodule and is a highly high nodule which is growing very quickly in the last month.  Patient has seen ENT and endoscopy was negative.  She has distant metastases to the adrenal gland.  Pathology consistent with small cell consistent with a lung primary.  MRI brain negative and MRI of the spine shows no impingement without any cord compression.     Chemotherapy initiated 5/22/2018 with carboplatin -16.  Plans to administer 4-6 cycles of chemotherapy.  We will repeat CT scans following cycle 2.    2.  Neutropenia prophylaxis.  The patient will continue to receive Neulasta injection on day 4, rather than on body injector due to frail body habitus.    3.  Anxiety and depression. The patient was previously referred to behavioral oncology.    4.  Venous access.  Port placed by Dr. Granger 5/25/2018.  Patient continues to slowly recover from port placement using tylenol as  needed.    Plan:    1. Continue Tylenol as needed for discomfort from port placement.  2. Continue Zofran as needed for nausea.  The patient has been taking this scheduled with out any nausea, she was encouraged to decrease frequency.  3. Return in 1 week for CBC, CMP and follow-up with Dr. Canada for two-week toxicity check.  Patient will be due for her second dose of carboplatin -16 6/12/2018.      Uyen Skelton, APRN  05/30/2018     Cc: Dr. Heath

## 2018-05-31 ENCOUNTER — TELEPHONE (OUTPATIENT)
Dept: ONCOLOGY | Facility: CLINIC | Age: 63
End: 2018-05-31

## 2018-05-31 NOTE — TELEPHONE ENCOUNTER
Patient calling because she thought she might be constipated so she took two senokot last night and two this morning and then she had diarrhea. SHe wants to see what she should eat for dinner? Informed patient she needs to stay hydrated. If she continues to have diarrhea she can take Immodium and can contact us if that does not help. It is ok for patient to eat. Told her it would be good to avoid anything that could upset her stomach, like spicy food or fried food. Pt v/u

## 2018-06-02 LAB
CYTO UR: NORMAL
LAB AP CASE REPORT: NORMAL
LAB AP CLINICAL INFORMATION: NORMAL
LAB AP INTRADEPARTMENTAL CONSULT: NORMAL
LAB AP SPECIAL STAINS: NORMAL
Lab: NORMAL
Lab: NORMAL
PATH REPORT.ADDENDUM SPEC: NORMAL
PATH REPORT.FINAL DX SPEC: NORMAL
PATH REPORT.GROSS SPEC: NORMAL

## 2018-06-04 NOTE — PROGRESS NOTES
Subjective     REASON FOR CONSULTATION:   1.  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    2.  Patient started chemotherapy with carboplatin/-16 as of May 22, 2018.                       HISTORY OF PRESENT ILLNESS:  The patient is a 63 y.o. year old female who is here for an opinion about the above issue.    History of Present Illness    The patient has extensive small cell lung carcinoma and currently on carboplatin and -16.  She is due to receive cycle 2 next week.  She is here for toxicity check.  She has tolerated the chemotherapy very well without any nausea or vomiting.  The neck node has shrunk significantly almost 50% to 60%.  So she has really responded very well.  She denies any fever or chills.  Her white count has been stable.  Given her creatinine was very low and we had to decrease her dose of chemotherapy therapy that is carboplatinum.  As a result I think she will be reasonable to increase by 10% on the chemotherapy to see if she can tolerate.    Past Medical History:   Diagnosis Date   • Anxiety    • Basal cell carcinoma     Follows up with Dermatology annually, [place] with Dr. Antoine's office   • Bowen's disease of vulva    • History of kidney stone 2018   • Left adrenal mass    • Lung cancer    • Lung mass     Bilateral   • Mitral valve prolapse    • Right renal mass    • Rosacea    • Seasonal allergies    • Shingles 2014   • Small cell carcinoma 05/14/2018    Small cell carcinoma of the lung with metastasis to the left neck node and the left adrenal gland   • Uterine mass      Past Surgical History:   Procedure Laterality Date   • BASAL CELL CARCINOMA EXCISION     • BREAST AUGMENTATION Bilateral 1980   • CATARACT EXTRACTION WITH INTRAOCULAR LENS IMPLANT     • EYE SURGERY      macular hole surgically closed/cataract removal and  implant   • RETINAL LASER PROCEDURE     • US GUIDED LYMPH NODE BIOPSY  5/14/2018    Ultrasound-guided biopsy of left neck mass-Dr. Roberto Calle, [facility]   • VENOUS ACCESS DEVICE (PORT) INSERTION Right 5/25/2018    Procedure: INSERTION OF PORTACATH;  Surgeon: Jelani Granger MD;  Location: [facility] OR;  Service: General   • VULVECTOMY N/A     partial, due to Bowen's Disease       Oncologic history  patient is a 62-year-old female who had gone to the emergency room at [facility] on March 30, 2018 with intermittent severe sharp right flank pain and right back pain which started a week prior.  She also states that it worsens in the night and it lasted 20-30 minutes and was intermittent.  She had pain after eating.  In the emergency room they did a CT of the abdomen pelvis which showed a 21 mm left adrenal mass.  A 1.5 cm nonobstructing right kidney stone.  Ultrasound of the gallbladder was done which showed no evidence of acute cholecystitis.    She also had a recent mammogram April 19, 2018 which was negative.  She came in with continued pain and went to the primary care physician.  She had a repeat abdominal ultrasound on April 23, 2018.  This showed that the liver and pancreas appeared normal the gallbladder appeared normal the spleen was normal.  She had calcifications in the [place].  There is a 15 mm focus hypoechoic lesion in the right mid pole of the kidney.  Adjacent to the upper pole of the left kidney there is a solid appearing mass which is 28 mm.  This corresponds to the adrenal lesion which was seen on the CT scan previously.    Patient  had a CT scan of the chest with contrast on May 2, 2018.  There is a large heterogeneous mass within the posterior  segment of the right upper lobe which measures approximately 8.8 x 5.6  cm and the craniocaudad span is approximately 6.6 cm. There is extension  into the posterior chest wall at the posterior right 4th-5th intercostal  space and there is extension into the  right T4-5 neural foramen. There  are multiple nodules surrounding the lesion in the right upper lobe.  There is also an irregular 4 x 3 cm mass at the anterior aspect of the  left upper lobe. In addition, there is an 8 mm irregular opacity in the  left apical region and a 6 mm pulmonary nodule inferiorly in the left  upper lobe. There are 2 irregular reticular opacities in the right lower  lobe which both measure approximately 1 cm. There are no pleural or  pericardial effusions. There is ill-defined lymphadenopathy at the right  hilum. There are moderately extensive underlying emphysematous changes.  In the visualized upper abdomen, there is a 3.0 x 2.2 cm left adrenal  nodule. There is a faintly hyperenhancing 7 mm lesion within the  anterior hepatic segment.    Patient continues to have right upper back pain.  She denies any bladder or bowel incontinence.  She denies any headache.  She does not have a tissue diagnosis.  She has also seen that she developed a left neck node which has increased within the last month.  She was referred to ENT Dr. Forbes.  The patient had an endoscopy which apparently was negative.  The neck node is reasonably large about 3 into 4 cm.  She will require core needle biopsy of the neck node which is easy access.  She did lose a lot of weight.  MRI brain is negative and thoracic spine MRI does not show evidence of any cord compression except involvement and impingement at the T4 and approved.    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  This is thought to be a small cell metastatic lung cancer.  Patient is here to discuss options of treatment.    I had a lengthy discussion about consideration of chemotherapy with carboplatin/-16.    Carboplatin and -16 started 5/22 2008.  Discussed with Dr. Robin Terry at [facility], following upfront chemotherapy he does not have any maintenance clinical trials.  I believe  [facility] may have maintenance clinical trial.  If very good response could consider referral to Dr. Nieto.    Current Outpatient Prescriptions on File Prior to Visit   Medication Sig Dispense Refill   • dexamethasone (DECADRON) 4 MG tablet Take 2 tablets the morning after chemotherapy and then 2 tablets, twice daily on days 3 and 4. 40 tablet 0   • HYDROcodone-acetaminophen (NORCO) 5-325 MG per tablet Take 1 tablet by mouth Every 6 (Six) Hours As Needed for Moderate Pain . 15 tablet 0   • Loratadine (CLARITIN CHILDRENS PO) Take 5 mL by mouth Daily.     • ondansetron (ZOFRAN) 4 MG tablet Take 1 tablet by mouth Every 8 (Eight) Hours As Needed for Nausea or Vomiting. 30 tablet 2   • ondansetron (ZOFRAN) 8 MG tablet Take 1 tablet by mouth 3 (Three) Times a Day As Needed for Nausea or Vomiting. 30 tablet 5     No current facility-administered medications on file prior to visit.         ALLERGIES:  No Known Allergies     Social History     Social History   • Marital status:      Occupational History   •       self-employed     Social History Main Topics   • Smoking status: Current Every Day Smoker     Packs/day: 1.50     Years: 48.00     Types: Cigarettes     Start date: 5/30/1971   • Smokeless tobacco: Never Used      Comment: started smoking at age 16   • Alcohol use Yes     4 - 6 Glasses of wine per week      Comment: 2 glasses of wine a few nights per week   • Drug use: No   • Sexual activity: No     Other Topics Concern   • Not on file     Social History Narrative    Works as a .  Lives at home with her daughter.          Family History   Problem Relation Age of Onset   • Other Mother         Cerebral hemorrhage   • Heart disease Father    • Thyroid cancer Sister    • No Known Problems Brother    • Malig Hyperthermia Neg Hx       I have reviewed the patient's medical history in detail and updated the computerized patient record.    Review of Systems   Constitutional:  Positive for fatigue and unexpected weight change. Negative for chills and fever.   HENT: Negative for mouth sores and sore throat.    Eyes: Negative for visual disturbance.   Respiratory: Negative for cough, chest tightness and shortness of breath.    Cardiovascular: Negative for chest pain, palpitations and leg swelling.   Gastrointestinal: Negative for abdominal pain, blood in stool, diarrhea, nausea and vomiting.   Genitourinary: Negative for dysuria and frequency.   Neurological: Negative for dizziness and weakness.   Hematological: Does not bruise/bleed easily.   Psychiatric/Behavioral: The patient is not nervous/anxious.    All other systems reviewed and are negative.      Objective     There were no vitals filed for this visit.  Current Status 5/30/2018   ECOG score 0       Physical Exam    GENERAL:  Well-developed, well-nourished in no acute distress. Bruise on right forearm from previous IV stick.  THROAT:  Oropharynx without lesions or exudates.  NECK:  Patient has large left neck mass, Previously measuring 4 x 5 cm, though I did not measure this today.  LYMPHATICS:  No cervical, supraclavicular adenopathy.  CHEST:  Lungs clear to auscultation. Good airflow.  Right chest port is clearly visible, present in the upper chest, the patient is very thin, without swelling or signs of infection, no erythema or warmth.  CARDIAC:  Regular rate and rhythm without murmurs, rubs or gallops. Normal S1,S2.  ABDOMEN:  Soft, nontender with no hepatosplenomegaly or masses.  EXTREMITIES:  No clubbing, cyanosis or edema.  NEUROLOGICAL:  Cranial Nerves II-XII grossly intact.  No focal neurological deficits.  PSYCHIATRIC:  Normal affect and mood.      RECENT LABS:    Results from last 7 days  Lab Units 05/30/18  1327   WBC 10*3/mm3 5.55   NEUTROS ABS 10*3/mm3 3.58   HEMOGLOBIN g/dL 13.9   HEMATOCRIT % 42.5   PLATELETS 10*3/mm3 176       Results from last 7 days  Lab Units 05/30/18  1327   SODIUM mmol/L 134   POTASSIUM mmol/L  3.9   CHLORIDE mmol/L 93*   CO2 mmol/L 29.3*   BUN mg/dL 17   CREATININE mg/dL 0.50*   CALCIUM mg/dL 9.4   ALBUMIN g/dL 3.90   BILIRUBIN mg/dL 0.2   ALK PHOS U/L 113   ALT (SGPT) U/L 20   AST (SGOT) U/L 25   GLUCOSE mg/dL 147*           Assessment/Plan     1. Extensive stage small cell lung cancer.  Newly diagnosed bilateral lung nodules, with a large 8 cm ×6 cm right upper lobe lung nodule with extension into the intercostal space between the fourth and fifth rib and also extending into the thoracic 4-5 neural foramina.  Patient has bilateral pulmonary nodular opacities which are suspicious for metastasis.  Patient also has a 3 cm left adrenal nodule suspicious for metastasis.  Further evaluation with a PET CT is recommended.  Patient has noticed a left neck nodule and is a highly high nodule which is growing very quickly in the last month.  Patient has seen ENT and endoscopy was negative.  She has distant metastases to the adrenal gland.  Pathology consistent with small cell consistent with a lung primary.  MRI brain negative and MRI of the spine shows no impingement without any cord compression.     Chemotherapy initiated 5/22/2018 with carboplatin -16.  Plans to administer 4-6 cycles of chemotherapy.  We will repeat CT scans following cycle 2.  · Patient tolerated chemotherapy very well.    2.  Neutropenia prophylaxis.  The patient will continue to receive Neulasta injection on day 4, rather than on body injector due to frail body habitus.    3.  Anxiety and depression. The patient was previously referred to behavioral oncology.    4.  Venous access.  Port placed by Dr. Granger 5/25/2018.  Patient continues to slowly recover from port placement using tylenol as needed.  Patient has pain at the port site but there is no erythema.  There is no swelling in the right upper extremity.  The pain is likely because the skin is stretched as she is very thin.    Plan:    1. Continue Tylenol as needed for discomfort from  port placement.  2. Continue Zofran as needed for nausea.  The patient has been taking this scheduled with out any nausea, she was encouraged to decrease frequency.  3.  Given patient is tolerated chemotherapy well we will go ahead and bring her back in 1 week with plans to increase the dose on the carboplatin  by 10-15%.  I'll see her back in one week with labs.    Leyla Canada MD  05/30/2018     Cc: Dr. Heath

## 2018-06-05 ENCOUNTER — LAB (OUTPATIENT)
Dept: LAB | Facility: HOSPITAL | Age: 63
End: 2018-06-05

## 2018-06-05 ENCOUNTER — OFFICE VISIT (OUTPATIENT)
Dept: ONCOLOGY | Facility: CLINIC | Age: 63
End: 2018-06-05

## 2018-06-05 VITALS
SYSTOLIC BLOOD PRESSURE: 126 MMHG | OXYGEN SATURATION: 98 % | RESPIRATION RATE: 16 BRPM | WEIGHT: 86.4 LBS | TEMPERATURE: 99.2 F | BODY MASS INDEX: 14.75 KG/M2 | DIASTOLIC BLOOD PRESSURE: 64 MMHG | HEART RATE: 71 BPM | HEIGHT: 64 IN

## 2018-06-05 DIAGNOSIS — C34.91 SMALL CELL CARCINOMA OF RIGHT LUNG (HCC): ICD-10-CM

## 2018-06-05 DIAGNOSIS — C34.90 MALIGNANT NEOPLASM OF LUNG, UNSPECIFIED LATERALITY, UNSPECIFIED PART OF LUNG (HCC): Primary | ICD-10-CM

## 2018-06-05 LAB
ALBUMIN SERPL-MCNC: 3.9 G/DL (ref 3.5–5.2)
ALBUMIN/GLOB SERPL: 1.2 G/DL (ref 1.1–2.4)
ALP SERPL-CCNC: 170 U/L (ref 38–116)
ALT SERPL W P-5'-P-CCNC: 15 U/L (ref 0–33)
ANION GAP SERPL CALCULATED.3IONS-SCNC: 14.5 MMOL/L
AST SERPL-CCNC: 24 U/L (ref 0–32)
BASOPHILS # BLD AUTO: 0.03 10*3/MM3 (ref 0–0.1)
BASOPHILS NFR BLD AUTO: 0.1 % (ref 0–1.1)
BILIRUB SERPL-MCNC: 0.2 MG/DL (ref 0.1–1.2)
BUN BLD-MCNC: 10 MG/DL (ref 6–20)
BUN/CREAT SERPL: 22.2 (ref 7.3–30)
CALCIUM SPEC-SCNC: 9.5 MG/DL (ref 8.5–10.2)
CHLORIDE SERPL-SCNC: 98 MMOL/L (ref 98–107)
CO2 SERPL-SCNC: 27.5 MMOL/L (ref 22–29)
CREAT BLD-MCNC: 0.45 MG/DL (ref 0.6–1.1)
DEPRECATED RDW RBC AUTO: 46.8 FL (ref 37–49)
EOSINOPHIL # BLD AUTO: 0.03 10*3/MM3 (ref 0–0.36)
EOSINOPHIL NFR BLD AUTO: 0.1 % (ref 1–5)
ERYTHROCYTE [DISTWIDTH] IN BLOOD BY AUTOMATED COUNT: 13.1 % (ref 11.7–14.5)
GFR SERPL CREATININE-BSD FRML MDRD: 140 ML/MIN/1.73
GLOBULIN UR ELPH-MCNC: 3.3 GM/DL (ref 1.8–3.5)
GLUCOSE BLD-MCNC: 116 MG/DL (ref 74–124)
HCT VFR BLD AUTO: 42.5 % (ref 34–45)
HGB BLD-MCNC: 13.9 G/DL (ref 11.5–14.9)
IMM GRANULOCYTES # BLD: 2.25 10*3/MM3 (ref 0–0.03)
IMM GRANULOCYTES NFR BLD: 8 % (ref 0–0.5)
LYMPHOCYTES # BLD AUTO: 1.86 10*3/MM3 (ref 1–3.5)
LYMPHOCYTES NFR BLD AUTO: 6.6 % (ref 20–49)
MCH RBC QN AUTO: 32 PG (ref 27–33)
MCHC RBC AUTO-ENTMCNC: 32.7 G/DL (ref 32–35)
MCV RBC AUTO: 97.7 FL (ref 83–97)
MONOCYTES # BLD AUTO: 2.15 10*3/MM3 (ref 0.25–0.8)
MONOCYTES NFR BLD AUTO: 7.7 % (ref 4–12)
NEUTROPHILS # BLD AUTO: 21.74 10*3/MM3 (ref 1.5–7)
NEUTROPHILS NFR BLD AUTO: 77.5 % (ref 39–75)
NRBC BLD MANUAL-RTO: 0 /100 WBC (ref 0–0)
PLATELET # BLD AUTO: 151 10*3/MM3 (ref 150–375)
PMV BLD AUTO: 9.3 FL (ref 8.9–12.1)
POTASSIUM BLD-SCNC: 3.7 MMOL/L (ref 3.5–4.7)
PROT SERPL-MCNC: 7.2 G/DL (ref 6.3–8)
RBC # BLD AUTO: 4.35 10*6/MM3 (ref 3.9–5)
SODIUM BLD-SCNC: 140 MMOL/L (ref 134–145)
WBC NRBC COR # BLD: 28.06 10*3/MM3 (ref 4–10)

## 2018-06-05 PROCEDURE — 36415 COLL VENOUS BLD VENIPUNCTURE: CPT | Performed by: INTERNAL MEDICINE

## 2018-06-05 PROCEDURE — 99215 OFFICE O/P EST HI 40 MIN: CPT | Performed by: INTERNAL MEDICINE

## 2018-06-05 PROCEDURE — 85025 COMPLETE CBC W/AUTO DIFF WBC: CPT | Performed by: INTERNAL MEDICINE

## 2018-06-05 PROCEDURE — 80053 COMPREHEN METABOLIC PANEL: CPT | Performed by: INTERNAL MEDICINE

## 2018-06-07 ENCOUNTER — TELEPHONE (OUTPATIENT)
Dept: ONCOLOGY | Facility: CLINIC | Age: 63
End: 2018-06-07

## 2018-06-07 NOTE — TELEPHONE ENCOUNTER
Pt calling because she had a temp of 101. Pt has no new symptoms or issues. She was just keeping an eye on her temp. Reviewed with Uyen BECK, pt can take 2 aleve and check her temp again this afternoon to see if it is coming down. If not patient should give us a call back. Pt v/u

## 2018-06-12 ENCOUNTER — OFFICE VISIT (OUTPATIENT)
Dept: ONCOLOGY | Facility: CLINIC | Age: 63
End: 2018-06-12

## 2018-06-12 ENCOUNTER — INFUSION (OUTPATIENT)
Dept: ONCOLOGY | Facility: HOSPITAL | Age: 63
End: 2018-06-12

## 2018-06-12 VITALS
TEMPERATURE: 99.5 F | DIASTOLIC BLOOD PRESSURE: 60 MMHG | HEART RATE: 74 BPM | HEIGHT: 64 IN | WEIGHT: 86.6 LBS | OXYGEN SATURATION: 97 % | SYSTOLIC BLOOD PRESSURE: 112 MMHG | BODY MASS INDEX: 14.78 KG/M2 | RESPIRATION RATE: 16 BRPM

## 2018-06-12 DIAGNOSIS — C34.90 MALIGNANT NEOPLASM OF LUNG, UNSPECIFIED LATERALITY, UNSPECIFIED PART OF LUNG (HCC): Primary | ICD-10-CM

## 2018-06-12 DIAGNOSIS — C34.90 MALIGNANT NEOPLASM OF LUNG, UNSPECIFIED LATERALITY, UNSPECIFIED PART OF LUNG (HCC): ICD-10-CM

## 2018-06-12 DIAGNOSIS — C34.91 SMALL CELL CARCINOMA OF RIGHT LUNG (HCC): Primary | ICD-10-CM

## 2018-06-12 LAB
ALBUMIN SERPL-MCNC: 3.5 G/DL (ref 3.5–5.2)
ALBUMIN/GLOB SERPL: 0.9 G/DL (ref 1.1–2.4)
ALP SERPL-CCNC: 114 U/L (ref 38–116)
ALT SERPL W P-5'-P-CCNC: 10 U/L (ref 0–33)
ANION GAP SERPL CALCULATED.3IONS-SCNC: 13.7 MMOL/L
AST SERPL-CCNC: 16 U/L (ref 0–32)
BASOPHILS # BLD AUTO: 0.06 10*3/MM3 (ref 0–0.1)
BASOPHILS NFR BLD AUTO: 0.4 % (ref 0–1.1)
BILIRUB SERPL-MCNC: 0.3 MG/DL (ref 0.1–1.2)
BUN BLD-MCNC: 9 MG/DL (ref 6–20)
BUN/CREAT SERPL: 25 (ref 7.3–30)
CALCIUM SPEC-SCNC: 9.5 MG/DL (ref 8.5–10.2)
CHLORIDE SERPL-SCNC: 96 MMOL/L (ref 98–107)
CO2 SERPL-SCNC: 29.3 MMOL/L (ref 22–29)
CREAT BLD-MCNC: 0.36 MG/DL (ref 0.6–1.1)
DEPRECATED RDW RBC AUTO: 43.9 FL (ref 37–49)
EOSINOPHIL # BLD AUTO: 0.01 10*3/MM3 (ref 0–0.36)
EOSINOPHIL NFR BLD AUTO: 0.1 % (ref 1–5)
ERYTHROCYTE [DISTWIDTH] IN BLOOD BY AUTOMATED COUNT: 12.6 % (ref 11.7–14.5)
GFR SERPL CREATININE-BSD FRML MDRD: >150 ML/MIN/1.73
GLOBULIN UR ELPH-MCNC: 3.7 GM/DL (ref 1.8–3.5)
GLUCOSE BLD-MCNC: 132 MG/DL (ref 74–124)
HCT VFR BLD AUTO: 37.6 % (ref 34–45)
HGB BLD-MCNC: 12.6 G/DL (ref 11.5–14.9)
IMM GRANULOCYTES # BLD: 0.1 10*3/MM3 (ref 0–0.03)
IMM GRANULOCYTES NFR BLD: 0.7 % (ref 0–0.5)
LYMPHOCYTES # BLD AUTO: 1.46 10*3/MM3 (ref 1–3.5)
LYMPHOCYTES NFR BLD AUTO: 9.8 % (ref 20–49)
MCH RBC QN AUTO: 32 PG (ref 27–33)
MCHC RBC AUTO-ENTMCNC: 33.5 G/DL (ref 32–35)
MCV RBC AUTO: 95.4 FL (ref 83–97)
MONOCYTES # BLD AUTO: 1.43 10*3/MM3 (ref 0.25–0.8)
MONOCYTES NFR BLD AUTO: 9.6 % (ref 4–12)
NEUTROPHILS # BLD AUTO: 11.84 10*3/MM3 (ref 1.5–7)
NEUTROPHILS NFR BLD AUTO: 79.4 % (ref 39–75)
NRBC BLD MANUAL-RTO: 0 /100 WBC (ref 0–0)
PLATELET # BLD AUTO: 505 10*3/MM3 (ref 150–375)
PMV BLD AUTO: 9.1 FL (ref 8.9–12.1)
POTASSIUM BLD-SCNC: 3.5 MMOL/L (ref 3.5–4.7)
PROT SERPL-MCNC: 7.2 G/DL (ref 6.3–8)
RBC # BLD AUTO: 3.94 10*6/MM3 (ref 3.9–5)
SODIUM BLD-SCNC: 139 MMOL/L (ref 134–145)
WBC NRBC COR # BLD: 14.9 10*3/MM3 (ref 4–10)

## 2018-06-12 PROCEDURE — 25010000002 DEXAMETHASONE: Performed by: INTERNAL MEDICINE

## 2018-06-12 PROCEDURE — 25010000002 PALONOSETRON PER 25 MCG: Performed by: INTERNAL MEDICINE

## 2018-06-12 PROCEDURE — 96367 TX/PROPH/DG ADDL SEQ IV INF: CPT | Performed by: INTERNAL MEDICINE

## 2018-06-12 PROCEDURE — 96413 CHEMO IV INFUSION 1 HR: CPT | Performed by: INTERNAL MEDICINE

## 2018-06-12 PROCEDURE — 99214 OFFICE O/P EST MOD 30 MIN: CPT | Performed by: INTERNAL MEDICINE

## 2018-06-12 PROCEDURE — 25010000002 FOSAPREPITANT PER 1 MG: Performed by: INTERNAL MEDICINE

## 2018-06-12 PROCEDURE — 80053 COMPREHEN METABOLIC PANEL: CPT | Performed by: INTERNAL MEDICINE

## 2018-06-12 PROCEDURE — 96417 CHEMO IV INFUS EACH ADDL SEQ: CPT | Performed by: INTERNAL MEDICINE

## 2018-06-12 PROCEDURE — 25010000002 ETOPOSIDE 100 MG/5ML SOLUTION 25 ML VIAL: Performed by: INTERNAL MEDICINE

## 2018-06-12 PROCEDURE — 25010000002 CARBOPLATIN PER 50 MG: Performed by: INTERNAL MEDICINE

## 2018-06-12 PROCEDURE — 85025 COMPLETE CBC W/AUTO DIFF WBC: CPT | Performed by: INTERNAL MEDICINE

## 2018-06-12 PROCEDURE — 96375 TX/PRO/DX INJ NEW DRUG ADDON: CPT | Performed by: INTERNAL MEDICINE

## 2018-06-12 PROCEDURE — 36415 COLL VENOUS BLD VENIPUNCTURE: CPT | Performed by: INTERNAL MEDICINE

## 2018-06-12 RX ORDER — PALONOSETRON 0.05 MG/ML
0.25 INJECTION, SOLUTION INTRAVENOUS ONCE
Status: COMPLETED | OUTPATIENT
Start: 2018-06-12 | End: 2018-06-12

## 2018-06-12 RX ORDER — PALONOSETRON 0.05 MG/ML
0.25 INJECTION, SOLUTION INTRAVENOUS ONCE
Status: CANCELLED | OUTPATIENT
Start: 2018-06-12

## 2018-06-12 RX ORDER — SODIUM CHLORIDE 9 MG/ML
250 INJECTION, SOLUTION INTRAVENOUS ONCE
Status: COMPLETED | OUTPATIENT
Start: 2018-06-12 | End: 2018-06-12

## 2018-06-12 RX ORDER — SODIUM CHLORIDE 9 MG/ML
250 INJECTION, SOLUTION INTRAVENOUS ONCE
Status: CANCELLED | OUTPATIENT
Start: 2018-06-12

## 2018-06-12 RX ADMIN — ETOPOSIDE 140 MG: 20 INJECTION, SOLUTION, CONCENTRATE INTRAVENOUS at 14:05

## 2018-06-12 RX ADMIN — SODIUM CHLORIDE 250 ML: 9 INJECTION, SOLUTION INTRAVENOUS at 12:51

## 2018-06-12 RX ADMIN — PALONOSETRON HYDROCHLORIDE 0.25 MG: 0.25 INJECTION INTRAVENOUS at 12:52

## 2018-06-12 RX ADMIN — CARBOPLATIN 500 MG: 10 INJECTION, SOLUTION INTRAVENOUS at 15:19

## 2018-06-12 RX ADMIN — DEXAMETHASONE SODIUM PHOSPHATE 12 MG: 4 INJECTION, SOLUTION INTRAMUSCULAR; INTRAVENOUS at 13:33

## 2018-06-12 RX ADMIN — SODIUM CHLORIDE 150 MG: 9 INJECTION, SOLUTION INTRAVENOUS at 12:56

## 2018-06-12 NOTE — PROGRESS NOTES
Subjective     REASON FOR CONSULTATION:   1.  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    2.  Patient started chemotherapy with carboplatin/-16 as of May 22, 2018.                       HISTORY OF PRESENT ILLNESS:  The patient is a 63 y.o. year old female who is here for an opinion about the above issue.    History of Present Illness    The patient has extensive small cell lung carcinoma and currently on carboplatin and -16.  She is due to receive cycle 2 today.  She tolerated the chemotherapy well.  Given that she is very frail her dose was initiated used as her dose was very very high for her low creatinine.  As a result we gave her for 50 mg of carboplatinum last time.  Currently based on the creatinine her dose comes up to 600 mg but given that she is very frail we will go ahead and give her a total dose of 500 mg carboplatinum today.  She did get Neulasta support and has not had neutropenia.  Her left neck lymphadenopathy has decreased significantly.  She did not have nausea vomiting or neutropenic fever.    Past Medical History:   Diagnosis Date   • Anxiety    • Basal cell carcinoma     Follows up with Dermatology annually, [place] with Dr. Antoine's office   • Bowen's disease of vulva    • History of kidney stone 2018   • Left adrenal mass    • Lung cancer    • Lung mass     Bilateral   • Mitral valve prolapse    • Right renal mass    • Rosacea    • Seasonal allergies    • Shingles 2014   • Small cell carcinoma 05/14/2018    Small cell carcinoma of the lung with metastasis to the left neck node and the left adrenal gland   • Uterine mass      Past Surgical History:   Procedure Laterality Date   • BASAL CELL CARCINOMA EXCISION     • BREAST AUGMENTATION Bilateral 1980   • CATARACT EXTRACTION WITH INTRAOCULAR LENS IMPLANT     • EYE SURGERY       macular hole surgically closed/cataract removal and implant   • RETINAL LASER PROCEDURE     • US GUIDED LYMPH NODE BIOPSY  5/14/2018    Ultrasound-guided biopsy of left neck mass-Dr. Roberto Calle, [facility]   • VENOUS ACCESS DEVICE (PORT) INSERTION Right 5/25/2018    Procedure: INSERTION OF PORTACATH;  Surgeon: Jelani Granger MD;  Location: [facility] OR;  Service: General   • VULVECTOMY N/A     partial, due to Bowen's Disease       Oncologic history  patient is a 62-year-old female who had gone to the emergency room at [facility] on March 30, 2018 with intermittent severe sharp right flank pain and right back pain which started a week prior.  She also states that it worsens in the night and it lasted 20-30 minutes and was intermittent.  She had pain after eating.  In the emergency room they did a CT of the abdomen pelvis which showed a 21 mm left adrenal mass.  A 1.5 cm nonobstructing right kidney stone.  Ultrasound of the gallbladder was done which showed no evidence of acute cholecystitis.    She also had a recent mammogram April 19, 2018 which was negative.  She came in with continued pain and went to the primary care physician.  She had a repeat abdominal ultrasound on April 23, 2018.  This showed that the liver and pancreas appeared normal the gallbladder appeared normal the spleen was normal.  She had calcifications in the [place].  There is a 15 mm focus hypoechoic lesion in the right mid pole of the kidney.  Adjacent to the upper pole of the left kidney there is a solid appearing mass which is 28 mm.  This corresponds to the adrenal lesion which was seen on the CT scan previously.    Patient  had a CT scan of the chest with contrast on May 2, 2018.  There is a large heterogeneous mass within the posterior  segment of the right upper lobe which measures approximately 8.8 x 5.6  cm and the craniocaudad span is approximately 6.6 cm. There is extension  into the posterior chest wall at the posterior right 4th-5th  intercostal  space and there is extension into the right T4-5 neural foramen. There  are multiple nodules surrounding the lesion in the right upper lobe.  There is also an irregular 4 x 3 cm mass at the anterior aspect of the  left upper lobe. In addition, there is an 8 mm irregular opacity in the  left apical region and a 6 mm pulmonary nodule inferiorly in the left  upper lobe. There are 2 irregular reticular opacities in the right lower  lobe which both measure approximately 1 cm. There are no pleural or  pericardial effusions. There is ill-defined lymphadenopathy at the right  hilum. There are moderately extensive underlying emphysematous changes.  In the visualized upper abdomen, there is a 3.0 x 2.2 cm left adrenal  nodule. There is a faintly hyperenhancing 7 mm lesion within the  anterior hepatic segment.    Patient continues to have right upper back pain.  She denies any bladder or bowel incontinence.  She denies any headache.  She does not have a tissue diagnosis.  She has also seen that she developed a left neck node which has increased within the last month.  She was referred to ENT Dr. Forbes.  The patient had an endoscopy which apparently was negative.  The neck node is reasonably large about 3 into 4 cm.  She will require core needle biopsy of the neck node which is easy access.  She did lose a lot of weight.  MRI brain is negative and thoracic spine MRI does not show evidence of any cord compression except involvement and impingement at the T4 and approved.    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  This is thought to be a small cell metastatic lung cancer.  Patient is here to discuss options of treatment.    I had a lengthy discussion about consideration of chemotherapy with carboplatin/-16.    Carboplatin and -16 started 5/22 2008.  Discussed with Dr. Robin Terry at [facility], following upfront chemotherapy he does  not have any maintenance clinical trials.  I believe [facility] may have maintenance clinical trial.  If very good response could consider referral to Dr. Nieto.    Current Outpatient Prescriptions on File Prior to Visit   Medication Sig Dispense Refill   • Loratadine (CLARITIN CHILDRENS PO) Take 5 mL by mouth Daily.       No current facility-administered medications on file prior to visit.         ALLERGIES:  No Known Allergies     Social History     Social History   • Marital status:      Occupational History   •       self-employed     Social History Main Topics   • Smoking status: Current Every Day Smoker     Packs/day: 1.50     Years: 48.00     Types: Cigarettes     Start date: 5/30/1971   • Smokeless tobacco: Never Used      Comment: started smoking at age 16   • Alcohol use Yes     4 - 6 Glasses of wine per week      Comment: 2 glasses of wine a few nights per week   • Drug use: No   • Sexual activity: No     Other Topics Concern   • Not on file     Social History Narrative    Works as a .  Lives at home with her daughter.          Family History   Problem Relation Age of Onset   • Other Mother         Cerebral hemorrhage   • Heart disease Father    • Thyroid cancer Sister    • No Known Problems Brother    • Malig Hyperthermia Neg Hx       I have reviewed the patient's medical history in detail and updated the computerized patient record.    Review of Systems   Constitutional: Positive for fatigue and unexpected weight change. Negative for chills and fever.   HENT: Negative for mouth sores and sore throat.    Eyes: Negative for visual disturbance.   Respiratory: Negative for cough, chest tightness and shortness of breath.    Cardiovascular: Negative for chest pain, palpitations and leg swelling.   Gastrointestinal: Negative for abdominal pain, blood in stool, diarrhea, nausea and vomiting.   Genitourinary: Negative for dysuria and frequency.   Neurological:  "Negative for dizziness and weakness.   Hematological: Does not bruise/bleed easily.   Psychiatric/Behavioral: The patient is not nervous/anxious.    All other systems reviewed and are negative.  Patient did not have nausea or vomiting and no neutropenic fever.    Objective     Vitals:    06/12/18 1150   BP: 112/60   Pulse: 74   Resp: 16   Temp: 99.5 °F (37.5 °C)   SpO2: 97%  Comment: at rest   Weight: 39.3 kg (86 lb 9.6 oz)   Height: 163.4 cm (64.33\")   PainSc: 0-No pain     Current Status 6/12/2018   ECOG score 0       Physical Exam      GENERAL:  Well-developed, well-nourished in no acute distress.   SKIN:  Warm, dry without rashes, purpura or petechiae.  EYES:  Pupils equal, round and reactive to light.  EOMs intact.  Conjunctivae normal.  EARS:  Hearing intact.  NOSE:  Septum midline.  No excoriations or nasal discharge.  MOUTH:  Tongue is well-papillated; no stomatitis or ulcers.  Lips normal.  THROAT:  Oropharynx without lesions or exudates.  NECK:  Supple with good range of motion; no thyromegaly or masses, no JVD.  LYMPHATICS:  No cervical, supraclavicular, axillary or inguinal adenopathy.  CHEST:  Lungs clear to auscultation. Good airflow.  CARDIAC:  Regular rate and rhythm without murmurs, rubs or gallops. Normal S1,S2.  ABDOMEN:  Soft, nontender with no hepatosplenomegaly or masses.  EXTREMITIES:  No clubbing, cyanosis or edema.  NEUROLOGICAL:  Cranial Nerves II-XII grossly intact.  No focal neurological deficits.  PSYCHIATRIC:  Normal affect and mood.    RECENT LABS:    Results from last 7 days  Lab Units 06/12/18  1127   WBC 10*3/mm3 14.90*   NEUTROS ABS 10*3/mm3 11.84*   HEMOGLOBIN g/dL 12.6   HEMATOCRIT % 37.6   PLATELETS 10*3/mm3 505*       Results from last 7 days  Lab Units 06/12/18  1127   SODIUM mmol/L 139   POTASSIUM mmol/L 3.5   CHLORIDE mmol/L 96*   CO2 mmol/L 29.3*   BUN mg/dL 9   CREATININE mg/dL 0.36*   CALCIUM mg/dL 9.5   ALBUMIN g/dL 3.50   BILIRUBIN mg/dL 0.3   ALK PHOS U/L 114   ALT " (SGPT) U/L 10   AST (SGOT) U/L 16   GLUCOSE mg/dL 132*           Assessment/Plan     1. Extensive stage small cell lung cancer.  Newly diagnosed bilateral lung nodules, with a large 8 cm ×6 cm right upper lobe lung nodule with extension into the intercostal space between the fourth and fifth rib and also extending into the thoracic 4-5 neural foramina.  Patient has bilateral pulmonary nodular opacities which are suspicious for metastasis.  Patient also has a 3 cm left adrenal nodule suspicious for metastasis.  Further evaluation with a PET CT is recommended.  Patient has noticed a left neck nodule and is a highly high nodule which is growing very quickly in the last month.  Patient has seen ENT and endoscopy was negative.  She has distant metastases to the adrenal gland.  Pathology consistent with small cell consistent with a lung primary.  MRI brain negative and MRI of the spine shows no impingement without any cord compression.     Chemotherapy initiated 5/22/2018 with carboplatin -16.  Plans to administer 4-6 cycles of chemotherapy.  We will repeat CT scans following cycle 3.  · Cycle 2 chemotherapy with carboplatinum -16.  Will increase dose today to 500 mg carboplatinum IV.  She'll continue with Neulasta support.  She has already shown clinical response.      2.  Neutropenia prophylaxis.  The patient will continue to receive Neulasta injection on day 4, rather than on body injector due to frail body habitus.    3.  Anxiety and depression. The patient was previously referred to behavioral oncology.    4.  Venous access.  Port placed by Dr. Granger 5/25/2018.  Patient continues to slowly recover from port placement using tylenol as needed.  Patient has pain at the port site but there is no erythema.  There is no swelling in the right upper extremity.  The pain is likely because the skin is stretched as she is very thin.    Plan:    1. Continue Tylenol as needed for discomfort from port placement.  2. Continue  Zofran as needed for nausea.  The patient has been taking this scheduled with out any nausea, she was encouraged to decrease frequency.  3.  Given patient is tolerated chemotherapy well we will go ahead and increase her dose to 500 mg IV today on the carboplatin.  4.  Follow-up in 3 weeks to give cycle 3 of chemotherapy with carboplatinum -16 following which a CT scan will be obtained to assess response.    Leyla Canada MD  05/30/2018     Cc: Dr. Heath

## 2018-06-13 ENCOUNTER — INFUSION (OUTPATIENT)
Dept: ONCOLOGY | Facility: HOSPITAL | Age: 63
End: 2018-06-13

## 2018-06-13 VITALS
SYSTOLIC BLOOD PRESSURE: 120 MMHG | DIASTOLIC BLOOD PRESSURE: 72 MMHG | BODY MASS INDEX: 15.27 KG/M2 | HEART RATE: 76 BPM | WEIGHT: 89.9 LBS

## 2018-06-13 DIAGNOSIS — C34.91 SMALL CELL CARCINOMA OF RIGHT LUNG (HCC): Primary | ICD-10-CM

## 2018-06-13 PROCEDURE — 96413 CHEMO IV INFUSION 1 HR: CPT | Performed by: NURSE PRACTITIONER

## 2018-06-13 PROCEDURE — 63710000001 PROCHLORPERAZINE MALEATE PER 10 MG: Performed by: NURSE PRACTITIONER

## 2018-06-13 PROCEDURE — 25010000002 ETOPOSIDE 100 MG/5ML SOLUTION 25 ML VIAL: Performed by: NURSE PRACTITIONER

## 2018-06-13 RX ORDER — SODIUM CHLORIDE 9 MG/ML
250 INJECTION, SOLUTION INTRAVENOUS ONCE
Status: CANCELLED | OUTPATIENT
Start: 2018-06-14

## 2018-06-13 RX ORDER — SODIUM CHLORIDE 9 MG/ML
250 INJECTION, SOLUTION INTRAVENOUS ONCE
Status: COMPLETED | OUTPATIENT
Start: 2018-06-13 | End: 2018-06-13

## 2018-06-13 RX ORDER — PROCHLORPERAZINE MALEATE 10 MG
10 TABLET ORAL ONCE
Status: COMPLETED | OUTPATIENT
Start: 2018-06-13 | End: 2018-06-13

## 2018-06-13 RX ORDER — PROCHLORPERAZINE MALEATE 10 MG
10 TABLET ORAL ONCE
Status: CANCELLED | OUTPATIENT
Start: 2018-06-14 | End: 2018-06-14

## 2018-06-13 RX ADMIN — SODIUM CHLORIDE 250 ML: 900 INJECTION, SOLUTION INTRAVENOUS at 13:21

## 2018-06-13 RX ADMIN — ETOPOSIDE 140 MG: 20 INJECTION, SOLUTION, CONCENTRATE INTRAVENOUS at 13:55

## 2018-06-13 RX ADMIN — PROCHLORPERAZINE MALEATE 10 MG: 10 TABLET, FILM COATED ORAL at 13:21

## 2018-06-14 ENCOUNTER — INFUSION (OUTPATIENT)
Dept: ONCOLOGY | Facility: HOSPITAL | Age: 63
End: 2018-06-14

## 2018-06-14 VITALS
OXYGEN SATURATION: 96 % | DIASTOLIC BLOOD PRESSURE: 75 MMHG | HEART RATE: 73 BPM | RESPIRATION RATE: 18 BRPM | SYSTOLIC BLOOD PRESSURE: 132 MMHG | BODY MASS INDEX: 15.56 KG/M2 | WEIGHT: 91.6 LBS

## 2018-06-14 DIAGNOSIS — C34.91 SMALL CELL CARCINOMA OF RIGHT LUNG (HCC): Primary | ICD-10-CM

## 2018-06-14 PROCEDURE — 96413 CHEMO IV INFUSION 1 HR: CPT | Performed by: NURSE PRACTITIONER

## 2018-06-14 PROCEDURE — 63710000001 PROCHLORPERAZINE MALEATE PER 10 MG: Performed by: NURSE PRACTITIONER

## 2018-06-14 PROCEDURE — 25010000002 ETOPOSIDE 100 MG/5ML SOLUTION 25 ML VIAL: Performed by: NURSE PRACTITIONER

## 2018-06-14 RX ORDER — SODIUM CHLORIDE 9 MG/ML
250 INJECTION, SOLUTION INTRAVENOUS ONCE
Status: COMPLETED | OUTPATIENT
Start: 2018-06-14 | End: 2018-06-14

## 2018-06-14 RX ORDER — PROCHLORPERAZINE MALEATE 10 MG
10 TABLET ORAL ONCE
Status: COMPLETED | OUTPATIENT
Start: 2018-06-14 | End: 2018-06-14

## 2018-06-14 RX ADMIN — SODIUM CHLORIDE 250 ML: 900 INJECTION, SOLUTION INTRAVENOUS at 13:24

## 2018-06-14 RX ADMIN — PROCHLORPERAZINE MALEATE 10 MG: 10 TABLET, FILM COATED ORAL at 13:24

## 2018-06-14 RX ADMIN — ETOPOSIDE 140 MG: 20 INJECTION, SOLUTION, CONCENTRATE INTRAVENOUS at 13:58

## 2018-06-15 ENCOUNTER — INFUSION (OUTPATIENT)
Dept: ONCOLOGY | Facility: HOSPITAL | Age: 63
End: 2018-06-15

## 2018-06-15 DIAGNOSIS — C34.91 SMALL CELL CARCINOMA OF RIGHT LUNG (HCC): Primary | ICD-10-CM

## 2018-06-15 PROCEDURE — 96372 THER/PROPH/DIAG INJ SC/IM: CPT | Performed by: NURSE PRACTITIONER

## 2018-06-15 PROCEDURE — 25010000002 PEGFILGRASTIM 6 MG/0.6ML SOLUTION PREFILLED SYRINGE: Performed by: NURSE PRACTITIONER

## 2018-06-15 RX ADMIN — PEGFILGRASTIM 6 MG: 6 INJECTION SUBCUTANEOUS at 15:04

## 2018-07-02 ENCOUNTER — TELEPHONE (OUTPATIENT)
Dept: ONCOLOGY | Facility: HOSPITAL | Age: 63
End: 2018-07-02

## 2018-07-02 RX ORDER — LIDOCAINE AND PRILOCAINE 25; 25 MG/G; MG/G
CREAM TOPICAL
Qty: 1 EACH | Refills: 0 | Status: SHIPPED | OUTPATIENT
Start: 2018-07-02 | End: 2018-11-28

## 2018-07-02 NOTE — TELEPHONE ENCOUNTER
"Pt calling about \"cream\" to apply to port site to help ease the pain.  I explained to her that we use EMLA cream, she request this to be sent to her pharmacy.    Medication escribed        ----- Message from Angela Valles sent at 7/2/2018 12:55 PM EDT -----  [phone]    Medication question    "

## 2018-07-09 ENCOUNTER — INFUSION (OUTPATIENT)
Dept: ONCOLOGY | Facility: HOSPITAL | Age: 63
End: 2018-07-09

## 2018-07-09 ENCOUNTER — OFFICE VISIT (OUTPATIENT)
Dept: ONCOLOGY | Facility: CLINIC | Age: 63
End: 2018-07-09

## 2018-07-09 ENCOUNTER — APPOINTMENT (OUTPATIENT)
Dept: ONCOLOGY | Facility: HOSPITAL | Age: 63
End: 2018-07-09

## 2018-07-09 VITALS
WEIGHT: 87.4 LBS | TEMPERATURE: 98.1 F | BODY MASS INDEX: 14.92 KG/M2 | DIASTOLIC BLOOD PRESSURE: 80 MMHG | HEIGHT: 64 IN | OXYGEN SATURATION: 99 % | RESPIRATION RATE: 18 BRPM | SYSTOLIC BLOOD PRESSURE: 120 MMHG | HEART RATE: 72 BPM

## 2018-07-09 DIAGNOSIS — R59.0 LEFT CERVICAL LYMPHADENOPATHY: ICD-10-CM

## 2018-07-09 DIAGNOSIS — C34.90 MALIGNANT NEOPLASM OF LUNG, UNSPECIFIED LATERALITY, UNSPECIFIED PART OF LUNG (HCC): ICD-10-CM

## 2018-07-09 DIAGNOSIS — C34.91 SMALL CELL CARCINOMA OF RIGHT LUNG (HCC): Primary | ICD-10-CM

## 2018-07-09 DIAGNOSIS — C34.90 MALIGNANT NEOPLASM OF LUNG, UNSPECIFIED LATERALITY, UNSPECIFIED PART OF LUNG (HCC): Primary | ICD-10-CM

## 2018-07-09 DIAGNOSIS — C34.91 SMALL CELL CARCINOMA OF RIGHT LUNG (HCC): ICD-10-CM

## 2018-07-09 DIAGNOSIS — M89.9 BONE LESION: ICD-10-CM

## 2018-07-09 LAB
ALBUMIN SERPL-MCNC: 3.9 G/DL (ref 3.5–5.2)
ALBUMIN/GLOB SERPL: 1.2 G/DL (ref 1.1–2.4)
ALP SERPL-CCNC: 105 U/L (ref 38–116)
ALT SERPL W P-5'-P-CCNC: 9 U/L (ref 0–33)
ANION GAP SERPL CALCULATED.3IONS-SCNC: 12.8 MMOL/L
AST SERPL-CCNC: 22 U/L (ref 0–32)
BASOPHILS # BLD AUTO: 0.06 10*3/MM3 (ref 0–0.1)
BASOPHILS NFR BLD AUTO: 0.4 % (ref 0–1.1)
BILIRUB SERPL-MCNC: 0.4 MG/DL (ref 0.1–1.2)
BUN BLD-MCNC: 9 MG/DL (ref 6–20)
BUN/CREAT SERPL: 23.1 (ref 7.3–30)
CALCIUM SPEC-SCNC: 9.4 MG/DL (ref 8.5–10.2)
CHLORIDE SERPL-SCNC: 99 MMOL/L (ref 98–107)
CO2 SERPL-SCNC: 27.2 MMOL/L (ref 22–29)
CREAT BLD-MCNC: 0.39 MG/DL (ref 0.6–1.1)
DEPRECATED RDW RBC AUTO: 57.2 FL (ref 37–49)
EOSINOPHIL # BLD AUTO: 0.04 10*3/MM3 (ref 0–0.36)
EOSINOPHIL NFR BLD AUTO: 0.3 % (ref 1–5)
ERYTHROCYTE [DISTWIDTH] IN BLOOD BY AUTOMATED COUNT: 16.3 % (ref 11.7–14.5)
GFR SERPL CREATININE-BSD FRML MDRD: >150 ML/MIN/1.73
GLOBULIN UR ELPH-MCNC: 3.2 GM/DL (ref 1.8–3.5)
GLUCOSE BLD-MCNC: 115 MG/DL (ref 74–124)
HCT VFR BLD AUTO: 35.2 % (ref 34–45)
HGB BLD-MCNC: 11.9 G/DL (ref 11.5–14.9)
IMM GRANULOCYTES # BLD: 0.08 10*3/MM3 (ref 0–0.03)
IMM GRANULOCYTES NFR BLD: 0.6 % (ref 0–0.5)
LYMPHOCYTES # BLD AUTO: 1.96 10*3/MM3 (ref 1–3.5)
LYMPHOCYTES NFR BLD AUTO: 14.3 % (ref 20–49)
MCH RBC QN AUTO: 33 PG (ref 27–33)
MCHC RBC AUTO-ENTMCNC: 33.8 G/DL (ref 32–35)
MCV RBC AUTO: 97.5 FL (ref 83–97)
MONOCYTES # BLD AUTO: 1.02 10*3/MM3 (ref 0.25–0.8)
MONOCYTES NFR BLD AUTO: 7.5 % (ref 4–12)
NEUTROPHILS # BLD AUTO: 10.52 10*3/MM3 (ref 1.5–7)
NEUTROPHILS NFR BLD AUTO: 76.9 % (ref 39–75)
NRBC BLD MANUAL-RTO: 0 /100 WBC (ref 0–0)
PLATELET # BLD AUTO: 377 10*3/MM3 (ref 150–375)
PMV BLD AUTO: 8.9 FL (ref 8.9–12.1)
POTASSIUM BLD-SCNC: 3.6 MMOL/L (ref 3.5–4.7)
PROT SERPL-MCNC: 7.1 G/DL (ref 6.3–8)
RBC # BLD AUTO: 3.61 10*6/MM3 (ref 3.9–5)
SODIUM BLD-SCNC: 139 MMOL/L (ref 134–145)
WBC NRBC COR # BLD: 13.68 10*3/MM3 (ref 4–10)

## 2018-07-09 PROCEDURE — 25010000003 DEXAMETHASONE SODIUM PHOSPHATE 100 MG/10ML SOLUTION: Performed by: INTERNAL MEDICINE

## 2018-07-09 PROCEDURE — 80053 COMPREHEN METABOLIC PANEL: CPT

## 2018-07-09 PROCEDURE — 25010000002 PALONOSETRON PER 25 MCG: Performed by: INTERNAL MEDICINE

## 2018-07-09 PROCEDURE — 25010000002 CARBOPLATIN PER 50 MG: Performed by: INTERNAL MEDICINE

## 2018-07-09 PROCEDURE — 96413 CHEMO IV INFUSION 1 HR: CPT | Performed by: INTERNAL MEDICINE

## 2018-07-09 PROCEDURE — 96367 TX/PROPH/DG ADDL SEQ IV INF: CPT | Performed by: INTERNAL MEDICINE

## 2018-07-09 PROCEDURE — 99214 OFFICE O/P EST MOD 30 MIN: CPT | Performed by: INTERNAL MEDICINE

## 2018-07-09 PROCEDURE — 96417 CHEMO IV INFUS EACH ADDL SEQ: CPT | Performed by: INTERNAL MEDICINE

## 2018-07-09 PROCEDURE — 25010000002 ETOPOSIDE 100 MG/5ML SOLUTION 5 ML VIAL: Performed by: INTERNAL MEDICINE

## 2018-07-09 PROCEDURE — 85025 COMPLETE CBC W/AUTO DIFF WBC: CPT

## 2018-07-09 PROCEDURE — 96375 TX/PRO/DX INJ NEW DRUG ADDON: CPT | Performed by: INTERNAL MEDICINE

## 2018-07-09 PROCEDURE — 25010000002 FOSAPREPITANT PER 1 MG: Performed by: INTERNAL MEDICINE

## 2018-07-09 RX ORDER — DOXYCYCLINE HYCLATE 100 MG/1
CAPSULE ORAL
Refills: 2 | COMMUNITY
Start: 2018-07-02 | End: 2018-12-24

## 2018-07-09 RX ORDER — PALONOSETRON 0.05 MG/ML
0.25 INJECTION, SOLUTION INTRAVENOUS ONCE
Status: CANCELLED | OUTPATIENT
Start: 2018-07-09

## 2018-07-09 RX ORDER — SODIUM CHLORIDE 9 MG/ML
250 INJECTION, SOLUTION INTRAVENOUS ONCE
Status: CANCELLED | OUTPATIENT
Start: 2018-07-09

## 2018-07-09 RX ORDER — PALONOSETRON 0.05 MG/ML
0.25 INJECTION, SOLUTION INTRAVENOUS ONCE
Status: COMPLETED | OUTPATIENT
Start: 2018-07-09 | End: 2018-07-09

## 2018-07-09 RX ORDER — SODIUM CHLORIDE 9 MG/ML
250 INJECTION, SOLUTION INTRAVENOUS ONCE
Status: COMPLETED | OUTPATIENT
Start: 2018-07-09 | End: 2018-07-09

## 2018-07-09 RX ADMIN — SODIUM CHLORIDE 250 ML: 9 INJECTION, SOLUTION INTRAVENOUS at 14:13

## 2018-07-09 RX ADMIN — DEXAMETHASONE SODIUM PHOSPHATE 12 MG: 10 INJECTION, SOLUTION INTRAMUSCULAR; INTRAVENOUS at 14:47

## 2018-07-09 RX ADMIN — ETOPOSIDE 140 MG: 20 INJECTION INTRAVENOUS at 15:06

## 2018-07-09 RX ADMIN — SODIUM CHLORIDE 150 MG: 9 INJECTION, SOLUTION INTRAVENOUS at 14:15

## 2018-07-09 RX ADMIN — PALONOSETRON HYDROCHLORIDE 0.25 MG: 0.25 INJECTION INTRAVENOUS at 14:13

## 2018-07-09 RX ADMIN — CARBOPLATIN 500 MG: 10 INJECTION, SOLUTION INTRAVENOUS at 16:08

## 2018-07-09 NOTE — PROGRESS NOTES
Subjective     REASON FOR CONSULTATION:   1.  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    2.  Patient started chemotherapy with carboplatin/-16 as of May 22, 2018.                       HISTORY OF PRESENT ILLNESS:  The patient is a 63 y.o. year old female who is here for an opinion about the above issue.    History of Present Illness    The patient has extensive small cell lung carcinoma and currently on carboplatin and -16.  She is due to receive cycle 3 today.  She tolerated the chemotherapy well.  Given that she is very frail her dose was initiated used as her dose was very very high for her low creatinine.  As a result we gave her for 500 mg of carboplatinum last time.  Currently based on the creatinine her dose comes up to 600 mg but given that she is very frail we will go ahead and give her a total dose of 500 mg carboplatinum today.  She did get Neulasta support and has not had neutropenia.  Her left neck lymphadenopathy has almost completely resolved.  She did not have nausea vomiting or neutropenic fever.  He is tolerated chemotherapy very well    Past Medical History:   Diagnosis Date   • Anxiety    • Basal cell carcinoma     Follows up with Dermatology annually, [place] with Dr. Antoine's office   • Bowen's disease of vulva    • History of kidney stone 2018   • Left adrenal mass (CMS/HCC)    • Lung cancer (CMS/HCC)    • Lung mass     Bilateral   • Mitral valve prolapse    • Right renal mass    • Rosacea    • Seasonal allergies    • Shingles 2014   • Small cell carcinoma (CMS/HCC) 05/14/2018    Small cell carcinoma of the lung with metastasis to the left neck node and the left adrenal gland   • Uterine mass      Past Surgical History:   Procedure Laterality Date   • BASAL CELL CARCINOMA EXCISION     • BREAST AUGMENTATION Bilateral 1980   •  CATARACT EXTRACTION WITH INTRAOCULAR LENS IMPLANT     • EYE SURGERY      macular hole surgically closed/cataract removal and implant   • RETINAL LASER PROCEDURE     • US GUIDED LYMPH NODE BIOPSY  5/14/2018    Ultrasound-guided biopsy of left neck mass-Dr. Roberto Calle, [facility]   • VENOUS ACCESS DEVICE (PORT) INSERTION Right 5/25/2018    Procedure: INSERTION OF PORTACATH;  Surgeon: Jelani Granger MD;  Location: [facility];  Service: General   • VULVECTOMY N/A     partial, due to Bowen's Disease       Oncologic history  patient is a 62-year-old female who had gone to the emergency room at [facility] on March 30, 2018 with intermittent severe sharp right flank pain and right back pain which started a week prior.  She also states that it worsens in the night and it lasted 20-30 minutes and was intermittent.  She had pain after eating.  In the emergency room they did a CT of the abdomen pelvis which showed a 21 mm left adrenal mass.  A 1.5 cm nonobstructing right kidney stone.  Ultrasound of the gallbladder was done which showed no evidence of acute cholecystitis.    She also had a recent mammogram April 19, 2018 which was negative.  She came in with continued pain and went to the primary care physician.  She had a repeat abdominal ultrasound on April 23, 2018.  This showed that the liver and pancreas appeared normal the gallbladder appeared normal the spleen was normal.  She had calcifications in the [place].  There is a 15 mm focus hypoechoic lesion in the right mid pole of the kidney.  Adjacent to the upper pole of the left kidney there is a solid appearing mass which is 28 mm.  This corresponds to the adrenal lesion which was seen on the CT scan previously.    Patient  had a CT scan of the chest with contrast on May 2, 2018.  There is a large heterogeneous mass within the posterior  segment of the right upper lobe which measures approximately 8.8 x 5.6  cm and the craniocaudad span is approximately 6.6 cm. There is  extension  into the posterior chest wall at the posterior right 4th-5th intercostal  space and there is extension into the right T4-5 neural foramen. There  are multiple nodules surrounding the lesion in the right upper lobe.  There is also an irregular 4 x 3 cm mass at the anterior aspect of the  left upper lobe. In addition, there is an 8 mm irregular opacity in the  left apical region and a 6 mm pulmonary nodule inferiorly in the left  upper lobe. There are 2 irregular reticular opacities in the right lower  lobe which both measure approximately 1 cm. There are no pleural or  pericardial effusions. There is ill-defined lymphadenopathy at the right  hilum. There are moderately extensive underlying emphysematous changes.  In the visualized upper abdomen, there is a 3.0 x 2.2 cm left adrenal  nodule. There is a faintly hyperenhancing 7 mm lesion within the  anterior hepatic segment.    Patient continues to have right upper back pain.  She denies any bladder or bowel incontinence.  She denies any headache.  She does not have a tissue diagnosis.  She has also seen that she developed a left neck node which has increased within the last month.  She was referred to ENT Dr. Forbes.  The patient had an endoscopy which apparently was negative.  The neck node is reasonably large about 3 into 4 cm.  She will require core needle biopsy of the neck node which is easy access.  She did lose a lot of weight.  MRI brain is negative and thoracic spine MRI does not show evidence of any cord compression except involvement and impingement at the T4 and approved.    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  This is thought to be a small cell metastatic lung cancer.  Patient is here to discuss options of treatment.    I had a lengthy discussion about consideration of chemotherapy with carboplatin/-16.    Carboplatin and -16 started 5/22 2008.  Discussed with   Robin Terry at [facility], following upfront chemotherapy he does not have any maintenance clinical trials.  I believe [facility] may have maintenance clinical trial.  If very good response could consider referral to Dr. Nieto.    Cycle 3 of carboplatinum -16 given July 9, 2018    Current Outpatient Prescriptions on File Prior to Visit   Medication Sig Dispense Refill   • lidocaine-prilocaine (EMLA) 2.5-2.5 % cream Apply quarter size amount to port site 30-45 minutes prior to port access 1 each 0   • Loratadine (CLARITIN CHILDRENS PO) Take 5 mL by mouth Daily.       No current facility-administered medications on file prior to visit.         ALLERGIES:  No Known Allergies     Social History     Social History   • Marital status:      Occupational History   •       self-employed     Social History Main Topics   • Smoking status: Current Every Day Smoker     Packs/day: 1.50     Years: 48.00     Types: Cigarettes     Start date: 5/30/1971   • Smokeless tobacco: Never Used      Comment: started smoking at age 16   • Alcohol use Yes     4 - 6 Glasses of wine per week      Comment: 2 glasses of wine a few nights per week   • Drug use: No   • Sexual activity: No     Other Topics Concern   • Not on file     Social History Narrative    Works as a .  Lives at home with her daughter.          Family History   Problem Relation Age of Onset   • Other Mother         Cerebral hemorrhage   • Heart disease Father    • Thyroid cancer Sister    • No Known Problems Brother    • Malig Hyperthermia Neg Hx       I have reviewed the patient's medical history in detail and updated the computerized patient record.    Review of Systems   Constitutional: Positive for fatigue and unexpected weight change. Negative for chills and fever.   HENT: Negative for mouth sores and sore throat.    Eyes: Negative for visual disturbance.   Respiratory: Negative for cough, chest tightness and shortness  "of breath.    Cardiovascular: Negative for chest pain, palpitations and leg swelling.   Gastrointestinal: Negative for abdominal pain, blood in stool, diarrhea, nausea and vomiting.   Genitourinary: Negative for dysuria and frequency.   Neurological: Negative for dizziness and weakness.   Hematological: Does not bruise/bleed easily.   Psychiatric/Behavioral: The patient is not nervous/anxious.    All other systems reviewed and are negative.  Patient did not have nausea or vomiting and no neutropenic fever.  Patient states that her weight is stable and she is gaining some weight and she is not as petite  Objective     Vitals:    07/09/18 1230   BP: 120/80   Pulse: 72   Resp: 18   Temp: 98.1 °F (36.7 °C)   SpO2: 99%   Weight: 39.6 kg (87 lb 6.4 oz)   Height: 163.5 cm (64.37\")   PainSc: 0-No pain     Current Status 7/9/2018   ECOG score 0       Physical Exam      GENERAL:  Well-developed, well-nourished in no acute distress.   SKIN:  Warm, dry without rashes, purpura or petechiae.  EYES:  Pupils equal, round and reactive to light.  EOMs intact.  Conjunctivae normal.  EARS:  Hearing intact.  NOSE:  Septum midline.  No excoriations or nasal discharge.  MOUTH:  Tongue is well-papillated; no stomatitis or ulcers.  Lips normal.  THROAT:  Oropharynx without lesions or exudates.  NECK:  Adenopathy in the left neck is resolved  LYMPHATICS:  No cervical, supraclavicular, axillary or inguinal adenopathy.  CHEST:  Lungs clear to auscultation. Good airflow.  CARDIAC:  Regular rate and rhythm without murmurs, rubs or gallops. Normal S1,S2.  ABDOMEN:  Soft, nontender with no hepatosplenomegaly or masses.  EXTREMITIES:  No clubbing, cyanosis or edema.  NEUROLOGICAL:  Cranial Nerves II-XII grossly intact.  No focal neurological deficits.  PSYCHIATRIC:  Normal affect and mood.      RECENT LABS:    Results from last 7 days  Lab Units 07/09/18  1215   WBC 10*3/mm3 13.68*   NEUTROS ABS 10*3/mm3 10.52*   HEMOGLOBIN g/dL 11.9   HEMATOCRIT % " 35.2   PLATELETS 10*3/mm3 377*       Results from last 7 days  Lab Units 07/09/18  1215   SODIUM mmol/L 139   POTASSIUM mmol/L 3.6   CHLORIDE mmol/L 99   CO2 mmol/L 27.2   BUN mg/dL 9   CREATININE mg/dL 0.39*   CALCIUM mg/dL 9.4   ALBUMIN g/dL 3.90   BILIRUBIN mg/dL 0.4   ALK PHOS U/L 105   ALT (SGPT) U/L 9   AST (SGOT) U/L 22   GLUCOSE mg/dL 115           Assessment/Plan     1. Extensive stage small cell lung cancer.  Newly diagnosed bilateral lung nodules, with a large 8 cm ×6 cm right upper lobe lung nodule with extension into the intercostal space between the fourth and fifth rib and also extending into the thoracic 4-5 neural foramina.  Patient has bilateral pulmonary nodular opacities which are suspicious for metastasis.  Patient also has a 3 cm left adrenal nodule suspicious for metastasis.  Further evaluation with a PET CT is recommended.  Patient has noticed a left neck nodule and is a highly high nodule which is growing very quickly in the last month.  Patient has seen ENT and endoscopy was negative.  She has distant metastases to the adrenal gland.  Pathology consistent with small cell consistent with a lung primary.  MRI brain negative and MRI of the spine shows no impingement without any cord compression.     Chemotherapy initiated 5/22/2018 with carboplatin -16.  Plans to administer 4-6 cycles of chemotherapy.  We will repeat CT scans following cycle 3.  · Cycle 2 chemotherapy with carboplatinum -16.  Will increase dose today to 500 mg carboplatinum IV.  She'll continue with Neulasta support.  She has already shown clinical response.  · Cycle 3 carboplatin -16 given July 9, 2018 with good tolerance      2.  Neutropenia prophylaxis.  The patient will continue to receive Neulasta injection on day 4, rather than on body injector due to frail body habitus.    3.  Anxiety and depression. The patient was previously referred to behavioral oncology.    4.  Venous access.  Port placed by Dr. Granger  5/25/2018.  Patient continues to slowly recover from port placement using tylenol as needed.  Patient has pain at the port site but there is no erythema.  There is no swelling in the right upper extremity.  The pain is likely because the skin is stretched as she is very thin.    Plan:    1.  Cycle 3 carboplatin -16 today.    2.  Follow-up tomorrow and after for  -16 with Neulasta support on day 4    3.  2 weeks for CT scan of the neck chest abdomen pelvis to assess response    4.  Up with me in 3 weeks to start cycle 4 of carboplatinum -16.  Patient tolerates we can consider total of 6 cycles.    Leyla Canada MD  05/30/2018     Cc: Dr. Heath

## 2018-07-10 ENCOUNTER — INFUSION (OUTPATIENT)
Dept: ONCOLOGY | Facility: HOSPITAL | Age: 63
End: 2018-07-10

## 2018-07-10 VITALS
DIASTOLIC BLOOD PRESSURE: 70 MMHG | OXYGEN SATURATION: 96 % | TEMPERATURE: 98.8 F | HEART RATE: 81 BPM | WEIGHT: 89.6 LBS | BODY MASS INDEX: 15.2 KG/M2 | SYSTOLIC BLOOD PRESSURE: 130 MMHG

## 2018-07-10 DIAGNOSIS — C34.91 SMALL CELL CARCINOMA OF RIGHT LUNG (HCC): Primary | ICD-10-CM

## 2018-07-10 PROCEDURE — 96413 CHEMO IV INFUSION 1 HR: CPT | Performed by: NURSE PRACTITIONER

## 2018-07-10 PROCEDURE — 63710000001 PROCHLORPERAZINE MALEATE PER 10 MG: Performed by: NURSE PRACTITIONER

## 2018-07-10 PROCEDURE — 25010000002 ETOPOSIDE 100 MG/5ML SOLUTION 5 ML VIAL: Performed by: NURSE PRACTITIONER

## 2018-07-10 RX ORDER — SODIUM CHLORIDE 9 MG/ML
250 INJECTION, SOLUTION INTRAVENOUS ONCE
Status: COMPLETED | OUTPATIENT
Start: 2018-07-10 | End: 2018-07-10

## 2018-07-10 RX ORDER — PROCHLORPERAZINE MALEATE 10 MG
10 TABLET ORAL ONCE
Status: COMPLETED | OUTPATIENT
Start: 2018-07-10 | End: 2018-07-10

## 2018-07-10 RX ADMIN — PROCHLORPERAZINE MALEATE 10 MG: 10 TABLET, FILM COATED ORAL at 13:13

## 2018-07-10 RX ADMIN — ETOPOSIDE 140 MG: 20 INJECTION, SOLUTION, CONCENTRATE INTRAVENOUS at 13:46

## 2018-07-10 RX ADMIN — SODIUM CHLORIDE 250 ML: 9 INJECTION, SOLUTION INTRAVENOUS at 13:13

## 2018-07-11 ENCOUNTER — APPOINTMENT (OUTPATIENT)
Dept: ONCOLOGY | Facility: HOSPITAL | Age: 63
End: 2018-07-11

## 2018-07-11 ENCOUNTER — INFUSION (OUTPATIENT)
Dept: ONCOLOGY | Facility: HOSPITAL | Age: 63
End: 2018-07-11

## 2018-07-11 VITALS — DIASTOLIC BLOOD PRESSURE: 66 MMHG | SYSTOLIC BLOOD PRESSURE: 115 MMHG | HEART RATE: 78 BPM | TEMPERATURE: 98.3 F

## 2018-07-11 DIAGNOSIS — C34.91 SMALL CELL CARCINOMA OF RIGHT LUNG (HCC): Primary | ICD-10-CM

## 2018-07-11 PROCEDURE — 96413 CHEMO IV INFUSION 1 HR: CPT | Performed by: NURSE PRACTITIONER

## 2018-07-11 PROCEDURE — 63710000001 PROCHLORPERAZINE MALEATE PER 10 MG: Performed by: NURSE PRACTITIONER

## 2018-07-11 PROCEDURE — 25010000002 ETOPOSIDE 100 MG/5ML SOLUTION 50 ML VIAL: Performed by: NURSE PRACTITIONER

## 2018-07-11 RX ORDER — SODIUM CHLORIDE 9 MG/ML
250 INJECTION, SOLUTION INTRAVENOUS ONCE
Status: COMPLETED | OUTPATIENT
Start: 2018-07-11 | End: 2018-07-11

## 2018-07-11 RX ORDER — PROCHLORPERAZINE MALEATE 10 MG
10 TABLET ORAL ONCE
Status: COMPLETED | OUTPATIENT
Start: 2018-07-11 | End: 2018-07-11

## 2018-07-11 RX ADMIN — ETOPOSIDE 140 MG: 20 INJECTION, SOLUTION, CONCENTRATE INTRAVENOUS at 13:59

## 2018-07-11 RX ADMIN — PROCHLORPERAZINE MALEATE 10 MG: 10 TABLET, FILM COATED ORAL at 13:42

## 2018-07-11 RX ADMIN — SODIUM CHLORIDE 250 ML: 9 INJECTION, SOLUTION INTRAVENOUS at 14:00

## 2018-07-12 ENCOUNTER — INFUSION (OUTPATIENT)
Dept: ONCOLOGY | Facility: HOSPITAL | Age: 63
End: 2018-07-12

## 2018-07-12 VITALS — TEMPERATURE: 98.3 F

## 2018-07-12 DIAGNOSIS — C34.91 SMALL CELL CARCINOMA OF RIGHT LUNG (HCC): Primary | ICD-10-CM

## 2018-07-12 PROCEDURE — 96372 THER/PROPH/DIAG INJ SC/IM: CPT | Performed by: INTERNAL MEDICINE

## 2018-07-12 PROCEDURE — 25010000002 PEGFILGRASTIM 6 MG/0.6ML SOLUTION PREFILLED SYRINGE: Performed by: INTERNAL MEDICINE

## 2018-07-12 RX ADMIN — PEGFILGRASTIM 6 MG: 6 INJECTION SUBCUTANEOUS at 15:11

## 2018-07-23 ENCOUNTER — HOSPITAL ENCOUNTER (OUTPATIENT)
Dept: PET IMAGING | Facility: HOSPITAL | Age: 63
Discharge: HOME | End: 2018-07-23
Attending: INTERNAL MEDICINE | Admitting: INTERNAL MEDICINE

## 2018-07-23 DIAGNOSIS — M89.9 BONE LESION: ICD-10-CM

## 2018-07-23 DIAGNOSIS — C34.91 SMALL CELL CARCINOMA OF RIGHT LUNG (HCC): ICD-10-CM

## 2018-07-23 DIAGNOSIS — R59.0 LEFT CERVICAL LYMPHADENOPATHY: ICD-10-CM

## 2018-07-23 DIAGNOSIS — C34.90 MALIGNANT NEOPLASM OF LUNG, UNSPECIFIED LATERALITY, UNSPECIFIED PART OF LUNG (HCC): ICD-10-CM

## 2018-07-23 LAB — CREAT BLDA-MCNC: 0.3 MG/DL (ref 0.6–1.3)

## 2018-07-23 PROCEDURE — 74177 CT ABD & PELVIS W/CONTRAST: CPT

## 2018-07-23 PROCEDURE — 82565 ASSAY OF CREATININE: CPT

## 2018-07-23 PROCEDURE — 71260 CT THORAX DX C+: CPT

## 2018-07-23 PROCEDURE — 70491 CT SOFT TISSUE NECK W/DYE: CPT

## 2018-07-23 PROCEDURE — 25010000002 IOPAMIDOL 61 % SOLUTION: Performed by: INTERNAL MEDICINE

## 2018-07-23 RX ADMIN — IOPAMIDOL 85 ML: 612 INJECTION, SOLUTION INTRAVENOUS at 13:44

## 2018-07-25 DIAGNOSIS — C34.91 SMALL CELL CARCINOMA OF RIGHT LUNG (HCC): ICD-10-CM

## 2018-07-30 ENCOUNTER — OFFICE VISIT (OUTPATIENT)
Dept: ONCOLOGY | Facility: CLINIC | Age: 63
End: 2018-07-30

## 2018-07-30 ENCOUNTER — INFUSION (OUTPATIENT)
Dept: ONCOLOGY | Facility: HOSPITAL | Age: 63
End: 2018-07-30

## 2018-07-30 VITALS
RESPIRATION RATE: 16 BRPM | WEIGHT: 88.4 LBS | DIASTOLIC BLOOD PRESSURE: 70 MMHG | OXYGEN SATURATION: 96 % | TEMPERATURE: 98.8 F | BODY MASS INDEX: 15.09 KG/M2 | HEART RATE: 76 BPM | SYSTOLIC BLOOD PRESSURE: 108 MMHG | HEIGHT: 64 IN

## 2018-07-30 DIAGNOSIS — R59.0 LEFT CERVICAL LYMPHADENOPATHY: Primary | ICD-10-CM

## 2018-07-30 DIAGNOSIS — C34.91 SMALL CELL CARCINOMA OF RIGHT LUNG (HCC): ICD-10-CM

## 2018-07-30 DIAGNOSIS — C34.91 SMALL CELL CARCINOMA OF RIGHT LUNG (HCC): Primary | ICD-10-CM

## 2018-07-30 LAB
ALBUMIN SERPL-MCNC: 3.8 G/DL (ref 3.5–5.2)
ALBUMIN/GLOB SERPL: 1.1 G/DL (ref 1.1–2.4)
ALP SERPL-CCNC: 131 U/L (ref 38–116)
ALT SERPL W P-5'-P-CCNC: 9 U/L (ref 0–33)
ANION GAP SERPL CALCULATED.3IONS-SCNC: 13.1 MMOL/L
AST SERPL-CCNC: 21 U/L (ref 0–32)
BASOPHILS # BLD AUTO: 0.05 10*3/MM3 (ref 0–0.1)
BASOPHILS NFR BLD AUTO: 0.3 % (ref 0–1.1)
BILIRUB SERPL-MCNC: 0.4 MG/DL (ref 0.1–1.2)
BUN BLD-MCNC: 7 MG/DL (ref 6–20)
BUN/CREAT SERPL: 20.6 (ref 7.3–30)
CALCIUM SPEC-SCNC: 9.1 MG/DL (ref 8.5–10.2)
CHLORIDE SERPL-SCNC: 98 MMOL/L (ref 98–107)
CO2 SERPL-SCNC: 24.9 MMOL/L (ref 22–29)
CREAT BLD-MCNC: 0.34 MG/DL (ref 0.6–1.1)
DEPRECATED RDW RBC AUTO: 68.6 FL (ref 37–49)
EOSINOPHIL # BLD AUTO: 0.04 10*3/MM3 (ref 0–0.36)
EOSINOPHIL NFR BLD AUTO: 0.2 % (ref 1–5)
ERYTHROCYTE [DISTWIDTH] IN BLOOD BY AUTOMATED COUNT: 18.5 % (ref 11.7–14.5)
GFR SERPL CREATININE-BSD FRML MDRD: >150 ML/MIN/1.73
GLOBULIN UR ELPH-MCNC: 3.4 GM/DL (ref 1.8–3.5)
GLUCOSE BLD-MCNC: 120 MG/DL (ref 74–124)
HCT VFR BLD AUTO: 33.5 % (ref 34–45)
HGB BLD-MCNC: 11 G/DL (ref 11.5–14.9)
IMM GRANULOCYTES # BLD: 0.16 10*3/MM3 (ref 0–0.03)
IMM GRANULOCYTES NFR BLD: 0.9 % (ref 0–0.5)
LYMPHOCYTES # BLD AUTO: 1.53 10*3/MM3 (ref 1–3.5)
LYMPHOCYTES NFR BLD AUTO: 9 % (ref 20–49)
MCH RBC QN AUTO: 33.6 PG (ref 27–33)
MCHC RBC AUTO-ENTMCNC: 32.8 G/DL (ref 32–35)
MCV RBC AUTO: 102.4 FL (ref 83–97)
MONOCYTES # BLD AUTO: 1.3 10*3/MM3 (ref 0.25–0.8)
MONOCYTES NFR BLD AUTO: 7.6 % (ref 4–12)
NEUTROPHILS # BLD AUTO: 13.97 10*3/MM3 (ref 1.5–7)
NEUTROPHILS NFR BLD AUTO: 82 % (ref 39–75)
NRBC BLD MANUAL-RTO: 0 /100 WBC (ref 0–0)
PLATELET # BLD AUTO: 272 10*3/MM3 (ref 150–375)
PMV BLD AUTO: 9 FL (ref 8.9–12.1)
POTASSIUM BLD-SCNC: 3.6 MMOL/L (ref 3.5–4.7)
PROT SERPL-MCNC: 7.2 G/DL (ref 6.3–8)
RBC # BLD AUTO: 3.27 10*6/MM3 (ref 3.9–5)
SODIUM BLD-SCNC: 136 MMOL/L (ref 134–145)
WBC NRBC COR # BLD: 17.05 10*3/MM3 (ref 4–10)

## 2018-07-30 PROCEDURE — 25010000002 PALONOSETRON PER 25 MCG: Performed by: INTERNAL MEDICINE

## 2018-07-30 PROCEDURE — 25010000002 CARBOPLATIN PER 50 MG: Performed by: INTERNAL MEDICINE

## 2018-07-30 PROCEDURE — 99215 OFFICE O/P EST HI 40 MIN: CPT | Performed by: INTERNAL MEDICINE

## 2018-07-30 PROCEDURE — 85025 COMPLETE CBC W/AUTO DIFF WBC: CPT

## 2018-07-30 PROCEDURE — 96413 CHEMO IV INFUSION 1 HR: CPT | Performed by: INTERNAL MEDICINE

## 2018-07-30 PROCEDURE — 25010000003 DEXAMETHASONE SODIUM PHOSPHATE 100 MG/10ML SOLUTION: Performed by: INTERNAL MEDICINE

## 2018-07-30 PROCEDURE — 25010000002 FOSAPREPITANT PER 1 MG: Performed by: INTERNAL MEDICINE

## 2018-07-30 PROCEDURE — 96367 TX/PROPH/DG ADDL SEQ IV INF: CPT | Performed by: INTERNAL MEDICINE

## 2018-07-30 PROCEDURE — 25010000002 ETOPOSIDE 100 MG/5ML SOLUTION 25 ML VIAL: Performed by: INTERNAL MEDICINE

## 2018-07-30 PROCEDURE — 96375 TX/PRO/DX INJ NEW DRUG ADDON: CPT | Performed by: INTERNAL MEDICINE

## 2018-07-30 PROCEDURE — 96417 CHEMO IV INFUS EACH ADDL SEQ: CPT | Performed by: INTERNAL MEDICINE

## 2018-07-30 PROCEDURE — 80053 COMPREHEN METABOLIC PANEL: CPT

## 2018-07-30 RX ORDER — SODIUM CHLORIDE 9 MG/ML
250 INJECTION, SOLUTION INTRAVENOUS ONCE
Status: CANCELLED | OUTPATIENT
Start: 2018-07-30

## 2018-07-30 RX ORDER — PALONOSETRON 0.05 MG/ML
0.25 INJECTION, SOLUTION INTRAVENOUS ONCE
Status: COMPLETED | OUTPATIENT
Start: 2018-07-30 | End: 2018-07-30

## 2018-07-30 RX ORDER — PALONOSETRON 0.05 MG/ML
0.25 INJECTION, SOLUTION INTRAVENOUS ONCE
Status: CANCELLED | OUTPATIENT
Start: 2018-07-30

## 2018-07-30 RX ORDER — SODIUM CHLORIDE 9 MG/ML
250 INJECTION, SOLUTION INTRAVENOUS ONCE
Status: COMPLETED | OUTPATIENT
Start: 2018-07-30 | End: 2018-07-30

## 2018-07-30 RX ADMIN — CARBOPLATIN 500 MG: 10 INJECTION, SOLUTION INTRAVENOUS at 15:22

## 2018-07-30 RX ADMIN — ETOPOSIDE 140 MG: 20 INJECTION, SOLUTION, CONCENTRATE INTRAVENOUS at 14:22

## 2018-07-30 RX ADMIN — DEXAMETHASONE SODIUM PHOSPHATE 12 MG: 10 INJECTION, SOLUTION INTRAMUSCULAR; INTRAVENOUS at 14:05

## 2018-07-30 RX ADMIN — SODIUM CHLORIDE 150 MG: 9 INJECTION, SOLUTION INTRAVENOUS at 13:28

## 2018-07-30 RX ADMIN — SODIUM CHLORIDE 250 ML: 900 INJECTION, SOLUTION INTRAVENOUS at 13:28

## 2018-07-30 RX ADMIN — PALONOSETRON 0.25 MG: 0.05 INJECTION, SOLUTION INTRAVENOUS at 13:28

## 2018-07-30 NOTE — PROGRESS NOTES
Subjective     REASON FOR CONSULTATION:   1.  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    2.  Patient started chemotherapy with carboplatin/-16 as of May 22, 2018.                       HISTORY OF PRESENT ILLNESS:  The patient is a 63 y.o. year old female who is here for an opinion about the above issue.    History of Present Illness    The patient has extensive small cell lung carcinoma and currently on carboplatin and -16.  She is due to receive cycle 3 today.  She tolerated the chemotherapy well.  Given that she is very frail her dose was initiated used as her dose was very very high for her low creatinine.  As a result we gave her for 500 mg of carboplatinum last time.  Currently based on the creatinine her dose comes up to 600 mg but given that she is very frail we will go ahead and give her a total dose of 500 mg carboplatinum today.  She did get Neulasta support and has not had neutropenia.  Her left neck lymphadenopathy has almost completely resolved.  She did not have nausea vomiting or neutropenic fever.  He is tolerated chemotherapy very well    Interval history: Patient completed 3 cycles and is here for cycle 4 of carboplatinum -16 today.  She has tolerated it without any problem.  Given that her creatinine is very low and she is very frail her dose comes up to 647 mg.  On discussing with pharmacy and we had to dose adjust and a total dose of 500 mg is given.  She has had a significant response by CT scan.  It is scan shows a significant decrease in size from 8.8×5.6 cm to 4.8×3.3 cm.  This is the pleural-based mass in the right upper lobe.  There is decreased encouraged encroachment into the T4-T5 neural foramina.  There is decrease in the size of the left upper lobe lesion from 4 cm to 2.5 cm.  The 3 cm left adrenal mass is  completely resolved.    Past Medical History:   Diagnosis Date   • Anxiety    • Basal cell carcinoma     Follows up with Dermatology annually, [place] with Dr. Antoine's office   • Bowen's disease of vulva    • History of kidney stone 2018   • Left adrenal mass (CMS/HCC)    • Lung cancer (CMS/HCC)    • Lung mass     Bilateral   • Mitral valve prolapse    • Right renal mass    • Rosacea    • Seasonal allergies    • Shingles 2014   • Small cell carcinoma (CMS/HCC) 05/14/2018    Small cell carcinoma of the lung with metastasis to the left neck node and the left adrenal gland   • Uterine mass      Past Surgical History:   Procedure Laterality Date   • BASAL CELL CARCINOMA EXCISION     • BREAST AUGMENTATION Bilateral 1980   • CATARACT EXTRACTION WITH INTRAOCULAR LENS IMPLANT     • EYE SURGERY      macular hole surgically closed/cataract removal and implant   • RETINAL LASER PROCEDURE     • US GUIDED LYMPH NODE BIOPSY  5/14/2018    Ultrasound-guided biopsy of left neck mass-Dr. Roberto Calle, [facility]   • VENOUS ACCESS DEVICE (PORT) INSERTION Right 5/25/2018    Procedure: INSERTION OF PORTACATH;  Surgeon: Jelani Granger MD;  Location: [facility];  Service: General   • VULVECTOMY N/A     partial, due to Bowen's Disease       Oncologic history  patient is a 62-year-old female who had gone to the emergency room at [facility] on March 30, 2018 with intermittent severe sharp right flank pain and right back pain which started a week prior.  She also states that it worsens in the night and it lasted 20-30 minutes and was intermittent.  She had pain after eating.  In the emergency room they did a CT of the abdomen pelvis which showed a 21 mm left adrenal mass.  A 1.5 cm nonobstructing right kidney stone.  Ultrasound of the gallbladder was done which showed no evidence of acute cholecystitis.    She also had a recent mammogram April 19, 2018 which was negative.  She came in with continued pain and went to the primary care physician.  She  had a repeat abdominal ultrasound on April 23, 2018.  This showed that the liver and pancreas appeared normal the gallbladder appeared normal the spleen was normal.  She had calcifications in the [place].  There is a 15 mm focus hypoechoic lesion in the right mid pole of the kidney.  Adjacent to the upper pole of the left kidney there is a solid appearing mass which is 28 mm.  This corresponds to the adrenal lesion which was seen on the CT scan previously.    Patient  had a CT scan of the chest with contrast on May 2, 2018.  There is a large heterogeneous mass within the posterior  segment of the right upper lobe which measures approximately 8.8 x 5.6  cm and the craniocaudad span is approximately 6.6 cm. There is extension  into the posterior chest wall at the posterior right 4th-5th intercostal  space and there is extension into the right T4-5 neural foramen. There  are multiple nodules surrounding the lesion in the right upper lobe.  There is also an irregular 4 x 3 cm mass at the anterior aspect of the  left upper lobe. In addition, there is an 8 mm irregular opacity in the  left apical region and a 6 mm pulmonary nodule inferiorly in the left  upper lobe. There are 2 irregular reticular opacities in the right lower  lobe which both measure approximately 1 cm. There are no pleural or  pericardial effusions. There is ill-defined lymphadenopathy at the right  hilum. There are moderately extensive underlying emphysematous changes.  In the visualized upper abdomen, there is a 3.0 x 2.2 cm left adrenal  nodule. There is a faintly hyperenhancing 7 mm lesion within the  anterior hepatic segment.    Patient continues to have right upper back pain.  She denies any bladder or bowel incontinence.  She denies any headache.  She does not have a tissue diagnosis.  She has also seen that she developed a left neck node which has increased within the last month.  She was referred to ENT Dr. Forbes.  The patient had an endoscopy  which apparently was negative.  The neck node is reasonably large about 3 into 4 cm.  She will require core needle biopsy of the neck node which is easy access.  She did lose a lot of weight.  MRI brain is negative and thoracic spine MRI does not show evidence of any cord compression except involvement and impingement at the T4 and approved.    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  This is thought to be a small cell metastatic lung cancer.  Patient is here to discuss options of treatment.    I had a lengthy discussion about consideration of chemotherapy with carboplatin/-16.    Carboplatin and -16 started 5/22 2008.  Discussed with Dr. Robin Terry at [facility], following upfront chemotherapy he does not have any maintenance clinical trials.  I believe [facility] may have maintenance clinical trial.  If very good response could consider referral to Dr. Nieto.    Cycle 3 of carboplatinum -16 given July 9, 2018  Cycle 4 carboplatin -16 on July 30, 2018  Current Outpatient Prescriptions on File Prior to Visit   Medication Sig Dispense Refill   • doxycycline (VIBRAMYCIN) 100 MG capsule TK 1 C PO BID  2   • lidocaine-prilocaine (EMLA) 2.5-2.5 % cream Apply quarter size amount to port site 30-45 minutes prior to port access 1 each 0   • Loratadine (CLARITIN CHILDRENS PO) Take 5 mL by mouth Daily.       No current facility-administered medications on file prior to visit.         ALLERGIES:  No Known Allergies     Social History     Social History   • Marital status:      Occupational History   •       self-employed     Social History Main Topics   • Smoking status: Current Every Day Smoker     Packs/day: 1.50     Years: 48.00     Types: Cigarettes     Start date: 5/30/1971   • Smokeless tobacco: Never Used      Comment: started smoking at age 16   • Alcohol use Yes     4 - 6 Glasses of wine per week       "Comment: 2 glasses of wine a few nights per week   • Drug use: No   • Sexual activity: No     Other Topics Concern   • Not on file     Social History Narrative    Works as a .  Lives at home with her daughter.          Family History   Problem Relation Age of Onset   • Other Mother         Cerebral hemorrhage   • Heart disease Father    • Thyroid cancer Sister    • No Known Problems Brother    • Malig Hyperthermia Neg Hx       I have reviewed the patient's medical history in detail and updated the computerized patient record.    Review of Systems   Constitutional: Positive for fatigue and unexpected weight change. Negative for chills and fever.   HENT: Negative for mouth sores and sore throat.    Eyes: Negative for visual disturbance.   Respiratory: Negative for cough, chest tightness and shortness of breath.    Cardiovascular: Negative for chest pain, palpitations and leg swelling.   Gastrointestinal: Negative for abdominal pain, blood in stool, diarrhea, nausea and vomiting.   Genitourinary: Negative for dysuria and frequency.   Neurological: Negative for dizziness and weakness.   Hematological: Does not bruise/bleed easily.   Psychiatric/Behavioral: The patient is not nervous/anxious.    All other systems reviewed and are negative.    Objective     Vitals:    07/30/18 1219   BP: 108/70   Pulse: 76   Resp: 16   Temp: 98.8 °F (37.1 °C)   SpO2: 96%  Comment: at rest   Weight: 40.1 kg (88 lb 6.4 oz)   Height: 163.5 cm (64.37\")   PainSc: 0-No pain     Current Status 7/30/2018   ECOG score 0       Physical Exam      GENERAL:  Well-developed, well-nourished in no acute distress.   SKIN:  Warm, dry without rashes, purpura or petechiae.  EYES:  Pupils equal, round and reactive to light.  EOMs intact.  Conjunctivae normal.  EARS:  Hearing intact.  NOSE:  Septum midline.  No excoriations or nasal discharge.  MOUTH:  Tongue is well-papillated; no stomatitis or ulcers.  Lips normal.  THROAT:  Oropharynx without " lesions or exudates.  NECK:  Adenopathy in the left neck is resolved  LYMPHATICS:  No cervical, supraclavicular, axillary or inguinal adenopathy.  CHEST:  Lungs clear to auscultation. Good airflow.  CARDIAC:  Regular rate and rhythm without murmurs, rubs or gallops. Normal S1,S2.  ABDOMEN:  Soft, nontender with no hepatosplenomegaly or masses.  EXTREMITIES:  No clubbing, cyanosis or edema.  NEUROLOGICAL:  Cranial Nerves II-XII grossly intact.  No focal neurological deficits.  PSYCHIATRIC:  Normal affect and mood.    I have examined the patient and is unchanged from before as of July 30, 2080  RECENT LABS:    Results from last 7 days  Lab Units 07/30/18  1202   WBC 10*3/mm3 17.05*   NEUTROS ABS 10*3/mm3 13.97*   HEMOGLOBIN g/dL 11.0*   HEMATOCRIT % 33.5*   PLATELETS 10*3/mm3 272       Results from last 7 days  Lab Units 07/30/18  1202   SODIUM mmol/L 136   POTASSIUM mmol/L 3.6   CHLORIDE mmol/L 98   CO2 mmol/L 24.9   BUN mg/dL 7   CREATININE mg/dL 0.34*   CALCIUM mg/dL 9.1   ALBUMIN g/dL 3.80   BILIRUBIN mg/dL 0.4   ALK PHOS U/L 131*   ALT (SGPT) U/L 9   AST (SGOT) U/L 21   GLUCOSE mg/dL 120           Assessment/Plan     1. Extensive stage small cell lung cancer.  Newly diagnosed bilateral lung nodules, with a large 8 cm ×6 cm right upper lobe lung nodule with extension into the intercostal space between the fourth and fifth rib and also extending into the thoracic 4-5 neural foramina.  Patient has bilateral pulmonary nodular opacities which are suspicious for metastasis.  Patient also has a 3 cm left adrenal nodule suspicious for metastasis.  Further evaluation with a PET CT is recommended.  Patient has noticed a left neck nodule and is a highly high nodule which is growing very quickly in the last month.  Patient has seen ENT and endoscopy was negative.  She has distant metastases to the adrenal gland.  Pathology consistent with small cell consistent with a lung primary.  MRI brain negative and MRI of the spine  shows no impingement without any cord compression.     Chemotherapy initiated 5/22/2018 with carboplatin -16.  Plans to administer 4-6 cycles of chemotherapy.  We will repeat CT scans following cycle 3.  · Cycle 2 chemotherapy with carboplatinum -16.  Will increase dose today to 500 mg carboplatinum IV.  She'll continue with Neulasta support.  She has already shown clinical response.  · Cycle 3 carboplatin -16 given July 9, 2018 with good tolerance  · CT scan after completion of 3 cycles shows significant response  · Her carboplatin dose is being adjusted to her body size as she has very low creatinine level and the dose comes up to 647 mg but however pharmacy discussion was done and felt that we would need to treat her with lower doses given her frail status and adjust to her body weight      2.  Neutropenia prophylaxis.  The patient will continue to receive Neulasta injection on day 4, rather than on body injector due to frail body habitus.    3.  Anxiety and depression. The patient was previously referred to behavioral oncology.    4.  Venous access.  Port placed by Dr. Granger 5/25/2018.  Patient continues to slowly recover from port placement using tylenol as needed.  Patient has pain at the port site but there is no erythema.  There is no swelling in the right upper extremity.  The pain is likely because the skin is stretched as she is very thin.    Plan:    1.  Cycle 4 carboplatin -16 today.    2.  Follow-up tomorrow and after for  -16 with Neulasta support on day 4    3.  Follow-up in 3 weeks with Dr. Jasmine to receive cycle 5 of carbo -16    4.  Follow-up with me in 6 weeks for cycle 6 of carbo -16 following which we will obtain repeat CT scan.    5.  We will discuss with Dr Abarca at the [facility] to see if he immunotherapy trial several herbal following standard of care treatment.   .    Leyla Canada MD  05/30/2018     Cc: Dr. Heath

## 2018-07-31 ENCOUNTER — INFUSION (OUTPATIENT)
Dept: ONCOLOGY | Facility: HOSPITAL | Age: 63
End: 2018-07-31

## 2018-07-31 VITALS — SYSTOLIC BLOOD PRESSURE: 135 MMHG | DIASTOLIC BLOOD PRESSURE: 74 MMHG | TEMPERATURE: 98 F | HEART RATE: 89 BPM

## 2018-07-31 DIAGNOSIS — C34.91 SMALL CELL CARCINOMA OF RIGHT LUNG (HCC): Primary | ICD-10-CM

## 2018-07-31 PROCEDURE — 96413 CHEMO IV INFUSION 1 HR: CPT | Performed by: INTERNAL MEDICINE

## 2018-07-31 PROCEDURE — 63710000001 PROCHLORPERAZINE MALEATE PER 10 MG: Performed by: INTERNAL MEDICINE

## 2018-07-31 PROCEDURE — 25010000002 ETOPOSIDE 100 MG/5ML SOLUTION 50 ML VIAL: Performed by: INTERNAL MEDICINE

## 2018-07-31 RX ORDER — SODIUM CHLORIDE 9 MG/ML
250 INJECTION, SOLUTION INTRAVENOUS ONCE
Status: CANCELLED | OUTPATIENT
Start: 2018-07-31

## 2018-07-31 RX ORDER — PROCHLORPERAZINE MALEATE 10 MG
10 TABLET ORAL ONCE
Status: CANCELLED | OUTPATIENT
Start: 2018-07-31 | End: 2018-07-31

## 2018-07-31 RX ORDER — SODIUM CHLORIDE 9 MG/ML
250 INJECTION, SOLUTION INTRAVENOUS ONCE
Status: CANCELLED | OUTPATIENT
Start: 2018-08-01

## 2018-07-31 RX ORDER — SODIUM CHLORIDE 9 MG/ML
250 INJECTION, SOLUTION INTRAVENOUS ONCE
Status: COMPLETED | OUTPATIENT
Start: 2018-07-31 | End: 2018-07-31

## 2018-07-31 RX ORDER — PROCHLORPERAZINE MALEATE 10 MG
10 TABLET ORAL ONCE
Status: COMPLETED | OUTPATIENT
Start: 2018-07-31 | End: 2018-07-31

## 2018-07-31 RX ORDER — PROCHLORPERAZINE MALEATE 10 MG
10 TABLET ORAL ONCE
Status: CANCELLED | OUTPATIENT
Start: 2018-08-01 | End: 2018-08-01

## 2018-07-31 RX ORDER — PROCHLORPERAZINE MALEATE 10 MG
10 TABLET ORAL EVERY 8 HOURS PRN
Qty: 30 TABLET | Refills: 1 | Status: SHIPPED | OUTPATIENT
Start: 2018-07-31 | End: 2018-12-24

## 2018-07-31 RX ADMIN — ETOPOSIDE 140 MG: 20 INJECTION, SOLUTION, CONCENTRATE INTRAVENOUS at 14:44

## 2018-07-31 RX ADMIN — PROCHLORPERAZINE MALEATE 10 MG: 10 TABLET, FILM COATED ORAL at 14:28

## 2018-07-31 RX ADMIN — SODIUM CHLORIDE 250 ML: 900 INJECTION, SOLUTION INTRAVENOUS at 14:27

## 2018-07-31 NOTE — PROGRESS NOTES
Pt states she has changed pharmacy to [facility]. Information updated in chart. She also requests prescription for compazine be sent to pharmacy. She states she has zofran at home but it causes constipation. Script for compazine escribed to [facility].

## 2018-08-01 ENCOUNTER — INFUSION (OUTPATIENT)
Dept: ONCOLOGY | Facility: HOSPITAL | Age: 63
End: 2018-08-01

## 2018-08-01 VITALS — DIASTOLIC BLOOD PRESSURE: 71 MMHG | SYSTOLIC BLOOD PRESSURE: 124 MMHG | HEART RATE: 80 BPM | TEMPERATURE: 98.8 F

## 2018-08-01 DIAGNOSIS — C34.91 SMALL CELL CARCINOMA OF RIGHT LUNG (HCC): Primary | ICD-10-CM

## 2018-08-01 PROCEDURE — 25010000002 ETOPOSIDE 100 MG/5ML SOLUTION 25 ML VIAL: Performed by: INTERNAL MEDICINE

## 2018-08-01 PROCEDURE — 96413 CHEMO IV INFUSION 1 HR: CPT | Performed by: INTERNAL MEDICINE

## 2018-08-01 PROCEDURE — 63710000001 PROCHLORPERAZINE MALEATE PER 10 MG: Performed by: INTERNAL MEDICINE

## 2018-08-01 RX ORDER — PROCHLORPERAZINE MALEATE 10 MG
10 TABLET ORAL ONCE
Status: COMPLETED | OUTPATIENT
Start: 2018-08-01 | End: 2018-08-01

## 2018-08-01 RX ORDER — SODIUM CHLORIDE 9 MG/ML
250 INJECTION, SOLUTION INTRAVENOUS ONCE
Status: COMPLETED | OUTPATIENT
Start: 2018-08-01 | End: 2018-08-01

## 2018-08-01 RX ADMIN — ETOPOSIDE 140 MG: 20 INJECTION, SOLUTION, CONCENTRATE INTRAVENOUS at 14:30

## 2018-08-01 RX ADMIN — PROCHLORPERAZINE MALEATE 10 MG: 10 TABLET, FILM COATED ORAL at 14:13

## 2018-08-01 RX ADMIN — SODIUM CHLORIDE 250 ML: 900 INJECTION, SOLUTION INTRAVENOUS at 14:13

## 2018-08-02 ENCOUNTER — TELEPHONE (OUTPATIENT)
Dept: ONCOLOGY | Facility: HOSPITAL | Age: 63
End: 2018-08-02

## 2018-08-02 ENCOUNTER — INFUSION (OUTPATIENT)
Dept: ONCOLOGY | Facility: HOSPITAL | Age: 63
End: 2018-08-02

## 2018-08-02 DIAGNOSIS — C34.91 SMALL CELL CARCINOMA OF RIGHT LUNG (HCC): Primary | ICD-10-CM

## 2018-08-02 PROCEDURE — 25010000002 PEGFILGRASTIM 6 MG/0.6ML SOLUTION PREFILLED SYRINGE: Performed by: INTERNAL MEDICINE

## 2018-08-02 PROCEDURE — 96372 THER/PROPH/DIAG INJ SC/IM: CPT | Performed by: INTERNAL MEDICINE

## 2018-08-02 RX ADMIN — PEGFILGRASTIM 6 MG: 6 INJECTION SUBCUTANEOUS at 15:23

## 2018-08-02 NOTE — TELEPHONE ENCOUNTER
Patient notified.  V/U.     ----- Message from Leyla Canada MD sent at 8/1/2018  8:13 PM EDT -----  I think it is best to do both oral and IV contrast as she had liver metastasis and past.  Leyla Canada MD  ----- Message -----  From: Yuridia Willoughby RN  Sent: 8/1/2018   3:29 PM  To: Leyla Canada MD    You have her scheduled for ct chest abd and pelvis for middle of August. She wants to know if you want her to do both the oral and IV contrast. She struggles with oral but will attempted to drink if necessary.

## 2018-08-03 ENCOUNTER — TELEPHONE (OUTPATIENT)
Dept: ONCOLOGY | Facility: CLINIC | Age: 63
End: 2018-08-03

## 2018-08-03 ENCOUNTER — TELEPHONE (OUTPATIENT)
Dept: ONCOLOGY | Facility: HOSPITAL | Age: 63
End: 2018-08-03

## 2018-08-03 NOTE — TELEPHONE ENCOUNTER
----- Message from Pippa Burton sent at 8/3/2018  8:10 AM EDT -----  [phone]  Ref:nuclear med. Study and [facility] #

## 2018-08-03 NOTE — TELEPHONE ENCOUNTER
----- Message from Yuridia Willoughby RN sent at 8/3/2018  8:29 AM EDT -----  hasn't heard from [facility] yet and is concerned.

## 2018-08-03 NOTE — TELEPHONE ENCOUNTER
Has not heard from [facility] and was told to call today if had not heard. In basket message sent to scheduling.

## 2018-08-13 ENCOUNTER — HOSPITAL ENCOUNTER (OUTPATIENT)
Dept: CT IMAGING | Facility: HOSPITAL | Age: 63
Discharge: HOME | End: 2018-08-13
Attending: INTERNAL MEDICINE | Admitting: INTERNAL MEDICINE

## 2018-08-13 ENCOUNTER — HOSPITAL ENCOUNTER (OUTPATIENT)
Dept: NUCLEAR MEDICINE | Facility: HOSPITAL | Age: 63
Discharge: HOME | End: 2018-08-13
Attending: INTERNAL MEDICINE

## 2018-08-13 DIAGNOSIS — R59.0 LEFT CERVICAL LYMPHADENOPATHY: ICD-10-CM

## 2018-08-13 DIAGNOSIS — C34.91 SMALL CELL CARCINOMA OF RIGHT LUNG (HCC): ICD-10-CM

## 2018-08-13 LAB — CREAT BLDA-MCNC: 0.4 MG/DL (ref 0.6–1.3)

## 2018-08-13 PROCEDURE — A9503 TC99M MEDRONATE: HCPCS | Performed by: INTERNAL MEDICINE

## 2018-08-13 PROCEDURE — 70491 CT SOFT TISSUE NECK W/DYE: CPT

## 2018-08-13 PROCEDURE — 0 TECHNETIUM MEDRONATE KIT: Performed by: INTERNAL MEDICINE

## 2018-08-13 PROCEDURE — 82565 ASSAY OF CREATININE: CPT

## 2018-08-13 PROCEDURE — 25010000002 IOPAMIDOL 61 % SOLUTION: Performed by: INTERNAL MEDICINE

## 2018-08-13 PROCEDURE — 71260 CT THORAX DX C+: CPT

## 2018-08-13 PROCEDURE — 74177 CT ABD & PELVIS W/CONTRAST: CPT

## 2018-08-13 PROCEDURE — 78306 BONE IMAGING WHOLE BODY: CPT

## 2018-08-13 RX ORDER — TC 99M MEDRONATE 20 MG/10ML
20.3 INJECTION, POWDER, LYOPHILIZED, FOR SOLUTION INTRAVENOUS
Status: COMPLETED | OUTPATIENT
Start: 2018-08-13 | End: 2018-08-13

## 2018-08-13 RX ADMIN — Medication 20.3 MILLICURIE: at 11:27

## 2018-08-13 RX ADMIN — IOPAMIDOL 85 ML: 612 INJECTION, SOLUTION INTRAVENOUS at 12:29

## 2018-08-16 ENCOUNTER — TELEPHONE (OUTPATIENT)
Dept: ONCOLOGY | Facility: CLINIC | Age: 63
End: 2018-08-16

## 2018-08-16 NOTE — TELEPHONE ENCOUNTER
----- Message from Leyla Canada MD sent at 8/16/2018 10:36 AM EDT -----  Regarding: RE: what to do?  Yes patient is being evaluated for clinical trial at [facility] with Dr. Abarca.  You can cancel the appointment on August 20 with Dr. Coley.  But please keep that appointment with me on September 10, 2018 as I would still continue to follow her.  I have already become indicated with her and she knows that.  Please call her and let her know that.    Leyla Canada MD  ----- Message -----  From: Reema Tillman  Sent: 8/16/2018   9:02 AM  To: Leyla Canada MD  Subject: what to do?                                      Patient states she is going to go on a clinical trial with Dr Abarca at [facility]. She has a follow up appt in 2 weeks with him to get enrolled etc.. He told her she cannot receive anymore chemo here to be able to participate in the trial. Do I cancel all appointments????    Jani

## 2018-08-20 ENCOUNTER — APPOINTMENT (OUTPATIENT)
Dept: ONCOLOGY | Facility: HOSPITAL | Age: 63
End: 2018-08-20

## 2018-08-20 ENCOUNTER — APPOINTMENT (OUTPATIENT)
Dept: ONCOLOGY | Facility: CLINIC | Age: 63
End: 2018-08-20

## 2018-08-21 ENCOUNTER — APPOINTMENT (OUTPATIENT)
Dept: ONCOLOGY | Facility: HOSPITAL | Age: 63
End: 2018-08-21

## 2018-08-22 ENCOUNTER — APPOINTMENT (OUTPATIENT)
Dept: ONCOLOGY | Facility: HOSPITAL | Age: 63
End: 2018-08-22

## 2018-08-23 ENCOUNTER — APPOINTMENT (OUTPATIENT)
Dept: ONCOLOGY | Facility: HOSPITAL | Age: 63
End: 2018-08-23

## 2018-09-09 NOTE — PROGRESS NOTES
Subjective     REASON FOR CONSULTATION:   1.  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    2.  Patient started chemotherapy with carboplatin/-16 as of May 22, 2018.    3.  Cycle 4 chemotherapy with carboplatinum -16 given on July 30, 2018 with CT scan showing stability of disease.  She has had significant response compared to prior imaging prior to start treatment.                       HISTORY OF PRESENT ILLNESS:  The patient is a 63 y.o. year old female who is here for an opinion about the above issue.    History of Present Illness    The patient has extensive small cell lung carcinoma and currently on carboplatin and V P 16.  Patient completed 4 cycles of carboplatinum -16 following which the CT  scan shows a significant decrease in size from 8.8×5.6 cm to 4.8×3.3 cm.  This is the pleural-based mass in the right upper lobe.  There is decreased  encroachment into the T4-T5 neural foramina.  There is decrease in the size of the left upper lobe lesion from 4 cm to 2.5 cm.  The 3 cm left adrenal mass is completely resolved.  The left neck node is completely resolved.    Following 4 cycles of therapy and referred the patient for a clinical trial at the [facility] with JUANITO .  She did see Dr. Nieto at the [facility] but patient refuses to go on the clinical trial.  Given that she had stability of disease discussion was done with Dr. Nieto  today.  Since she has tablet give disease after 4 cycles of chemotherapy and her tumor had encroached upon the T4-T5 vertebrae prior to start of treatment she may benefit from radiation to the right upper lobe lung lesion and also questionable consideration for PCI.  Given that this was stage IV disease and patient has tumor both in the left upper lobe of the lung and right upper lobe of  the lung I'm unsure that she really is a candidate for PCI.    Will refer to radiation oncology to get an opinion.  She is otherwise asymptomatic.    Past Medical History:   Diagnosis Date   • Anxiety    • Basal cell carcinoma     Follows up with Dermatology annually, [place] with Dr. Antoine's office   • Bowen's disease of vulva    • History of kidney stone 2018   • Left adrenal mass (CMS/HCC)    • Lung cancer (CMS/HCC)    • Lung mass     Bilateral   • Mitral valve prolapse    • Right renal mass    • Rosacea    • Seasonal allergies    • Shingles 2014   • Small cell carcinoma (CMS/HCC) 05/14/2018    Small cell carcinoma of the lung with metastasis to the left neck node and the left adrenal gland   • Uterine mass      Past Surgical History:   Procedure Laterality Date   • BASAL CELL CARCINOMA EXCISION     • BREAST AUGMENTATION Bilateral 1980   • CATARACT EXTRACTION WITH INTRAOCULAR LENS IMPLANT     • EYE SURGERY      macular hole surgically closed/cataract removal and implant   • RETINAL LASER PROCEDURE     • US GUIDED LYMPH NODE BIOPSY  5/14/2018    Ultrasound-guided biopsy of left neck mass-Dr. Roberto Calle, [facility]   • VENOUS ACCESS DEVICE (PORT) INSERTION Right 5/25/2018    Procedure: INSERTION OF PORTACATH;  Surgeon: Jelani Granger MD;  Location: [facility];  Service: General   • VULVECTOMY N/A     partial, due to Bowen's Disease       Oncologic history  patient is a 62-year-old female who had gone to the emergency room at [facility] on March 30, 2018 with intermittent severe sharp right flank pain and right back pain which started a week prior.  She also states that it worsens in the night and it lasted 20-30 minutes and was intermittent.  She had pain after eating.  In the emergency room they did a CT of the abdomen pelvis which showed a 21 mm left adrenal mass.  A 1.5 cm nonobstructing right kidney stone.  Ultrasound of the gallbladder was done which showed no evidence of acute cholecystitis.    She also had a  recent mammogram April 19, 2018 which was negative.  She came in with continued pain and went to the primary care physician.  She had a repeat abdominal ultrasound on April 23, 2018.  This showed that the liver and pancreas appeared normal the gallbladder appeared normal the spleen was normal.  She had calcifications in the Gay.  There is a 15 mm focus hypoechoic lesion in the right mid pole of the kidney.  Adjacent to the upper pole of the left kidney there is a solid appearing mass which is 28 mm.  This corresponds to the adrenal lesion which was seen on the CT scan previously.    Patient  had a CT scan of the chest with contrast on May 2, 2018.  There is a large heterogeneous mass within the posterior  segment of the right upper lobe which measures approximately 8.8 x 5.6  cm and the craniocaudad span is approximately 6.6 cm. There is extension  into the posterior chest wall at the posterior right 4th-5th intercostal  space and there is extension into the right T4-5 neural foramen. There  are multiple nodules surrounding the lesion in the right upper lobe.  There is also an irregular 4 x 3 cm mass at the anterior aspect of the  left upper lobe. In addition, there is an 8 mm irregular opacity in the  left apical region and a 6 mm pulmonary nodule inferiorly in the left  upper lobe. There are 2 irregular reticular opacities in the right lower  lobe which both measure approximately 1 cm. There are no pleural or  pericardial effusions. There is ill-defined lymphadenopathy at the right  hilum. There are moderately extensive underlying emphysematous changes.  In the visualized upper abdomen, there is a 3.0 x 2.2 cm left adrenal  nodule. There is a faintly hyperenhancing 7 mm lesion within the  anterior hepatic segment.    Patient continues to have right upper back pain.  She denies any bladder or bowel incontinence.  She denies any headache.  She does not have a tissue diagnosis.  She has also seen that she developed  a left neck node which has increased within the last month.  She was referred to ENT Dr. Forbes.  The patient had an endoscopy which apparently was negative.  The neck node is reasonably large about 3 into 4 cm.  She will require core needle biopsy of the neck node which is easy access.  She did lose a lot of weight.  MRI brain is negative and thoracic spine MRI does not show evidence of any cord compression except involvement and impingement at the T4 and approved.    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  This is thought to be a small cell metastatic lung cancer.  Patient is here to discuss options of treatment.    I had a lengthy discussion about consideration of chemotherapy with carboplatin/-16.    Carboplatin and -16 started 5/22 2008.  Discussed with Dr. Robin Terry at [facility], following upfront chemotherapy he does not have any maintenance clinical trials.  I believe [facility] may have maintenance clinical trial.  If very good response could consider referral to Dr. Nieto.    Cycle 3 of carboplatinum -16 given July 9, 2018  Cycle 4 carboplatin -16 on July 30, 2018    He shouldn't was referred to Dr. Nieto at the [facility] for clinical trial for extensive small cell lung cancer with ROWA -T  , patient does not want to go on that clinical trial.     Current Outpatient Prescriptions on File Prior to Visit   Medication Sig Dispense Refill   • doxycycline (VIBRAMYCIN) 100 MG capsule TK 1 C PO BID  2   • lidocaine-prilocaine (EMLA) 2.5-2.5 % cream Apply quarter size amount to port site 30-45 minutes prior to port access 1 each 0   • Loratadine (CLARITIN CHILDRENS PO) Take 5 mL by mouth Daily.     • prochlorperazine (COMPAZINE) 10 MG tablet Take 1 tablet by mouth Every 8 (Eight) Hours As Needed for Nausea or Vomiting. 30 tablet 1     No current facility-administered medications on file prior to visit.          ALLERGIES:  No Known Allergies     Social History     Social History   • Marital status:      Occupational History   •       self-employed     Social History Main Topics   • Smoking status: Current Every Day Smoker     Packs/day: 1.50     Years: 48.00     Types: Cigarettes     Start date: 5/30/1971   • Smokeless tobacco: Never Used      Comment: started smoking at age 16   • Alcohol use Yes     4 - 6 Glasses of wine per week      Comment: 2 glasses of wine a few nights per week   • Drug use: No   • Sexual activity: No     Other Topics Concern   • Not on file     Social History Narrative    Works as a .  Lives at home with her daughter.          Family History   Problem Relation Age of Onset   • Other Mother         Cerebral hemorrhage   • Heart disease Father    • Thyroid cancer Sister    • No Known Problems Brother    • Malig Hyperthermia Neg Hx       I have reviewed the patient's medical history in detail and updated the computerized patient record.    Review of Systems   Constitutional: Positive for fatigue and unexpected weight change. Negative for chills and fever.   HENT: Negative for mouth sores and sore throat.    Eyes: Negative for visual disturbance.   Respiratory: Negative for cough, chest tightness and shortness of breath.    Cardiovascular: Negative for chest pain, palpitations and leg swelling.   Gastrointestinal: Negative for abdominal pain, blood in stool, diarrhea, nausea and vomiting.   Genitourinary: Negative for dysuria and frequency.   Musculoskeletal: Negative for arthralgias, joint swelling, myalgias and neck stiffness.   Neurological: Negative for dizziness, weakness and headaches.   Hematological: Does not bruise/bleed easily.   Psychiatric/Behavioral: The patient is not nervous/anxious.    All other systems reviewed and are negative.    Objective     There were no vitals filed for this visit.  Current Status 7/30/2018   ECOG score 0       Physical  Exam      GENERAL:  Well-developed, well-nourished in no acute distress.   SKIN:  Warm, dry without rashes, purpura or petechiae.  EYES:  Pupils equal, round and reactive to light.  EOMs intact.  Conjunctivae normal.  EARS:  Hearing intact.  NOSE:  Septum midline.  No excoriations or nasal discharge.  MOUTH:  Tongue is well-papillated; no stomatitis or ulcers.  Lips normal.  THROAT:  Oropharynx without lesions or exudates.  NECK:  Supple with good range of motion; no thyromegaly or masses, no JVD.  LYMPHATICS:  No cervical, supraclavicular, axillary or inguinal adenopathy.  CHEST:  Lungs clear to auscultation. Good airflow.  CARDIAC:  Regular rate and rhythm without murmurs, rubs or gallops. Normal S1,S2.  ABDOMEN:  Soft, nontender with no hepatosplenomegaly or masses.  EXTREMITIES:  No clubbing, cyanosis or edema.  NEUROLOGICAL:  Cranial Nerves II-XII grossly intact.  No focal neurological deficits.  PSYCHIATRIC:  Normal affect and mood.          RECENT LABS:                Assessment/Plan     1. Extensive stage small cell lung cancer.  Newly diagnosed bilateral lung nodules, with a large 8 cm ×6 cm right upper lobe lung nodule with extension into the intercostal space between the fourth and fifth rib and also extending into the thoracic 4-5 neural foramina.  Patient has bilateral pulmonary nodular opacities which are suspicious for metastasis.  Patient also has a 3 cm left adrenal nodule suspicious for metastasis.  Further evaluation with a PET CT is recommended.  Patient has noticed a left neck nodule and is a highly high nodule which is growing very quickly in the last month.  Patient has seen ENT and endoscopy was negative.  She has distant metastases to the adrenal gland.  Pathology consistent with small cell consistent with a lung primary.  MRI brain negative and MRI of the spine shows no impingement without any cord compression.     Chemotherapy initiated 5/22/2018 with carboplatin -16.  Plans to  administer 4-6 cycles of chemotherapy.  We will repeat CT scans following cycle 3.  · Cycle 2 chemotherapy with carboplatinum -16.  Will increase dose today to 500 mg carboplatinum IV.  She'll continue with Neulasta support.  She has already shown clinical response.  · Cycle 3 carboplatin -16 given July 9, 2018 with good tolerance  · CT scan after completion of 3 cycles shows significant response  · Her carboplatin dose is being adjusted to her body size as she has very low creatinine level and the dose comes up to 647 mg but however pharmacy discussion was done and felt that we would need to treat her with lower doses given her frail status and adjust to her body weight  · Patient completed 4 cycles of chemotherapy with stability of disease.  · Agent was referred to  or clinical trial with JUANITO but patient refused.  · We will refer patient to radiation oncology to see if palliative radiation can be done to the right upper lobe lung lesion and questionable prophylactic cranial radiation, though I do not believe she is a candidate for that given she has bilateral lung nodules still present.  · If she fails in future she could be a candidate for topotecan or irinotecan.  Immunotherapy is really in this third line setting.      2.  Neutropenia prophylaxis.  The patient will continue to receive Neulasta injection on day 4, rather than on body injector due to frail body habitus.    3.  Anxiety and depression. The patient was previously referred to behavioral oncology.    4.  Venous access.  Port placed by Dr. Granger 5/25/2018.  Patient continues to slowly recover from port placement using tylenol as needed.  Patient has pain at the port site but there is no erythema.  There is no swelling in the right upper extremity.  The pain is likely because the skin is stretched as she is very thin.    Plan:    1.  Referred to radiation oncology.    2.  Questionable prophylactic cranial irradiation.    3.  Follow-up  with me in 3 weeks.  With labs    Leyla Canada MD  05/30/2018     Cc: Dr. Heath

## 2018-09-10 ENCOUNTER — LAB (OUTPATIENT)
Dept: LAB | Facility: HOSPITAL | Age: 63
End: 2018-09-10

## 2018-09-10 ENCOUNTER — OFFICE VISIT (OUTPATIENT)
Dept: ONCOLOGY | Facility: CLINIC | Age: 63
End: 2018-09-10

## 2018-09-10 VITALS
HEIGHT: 64 IN | TEMPERATURE: 98.9 F | BODY MASS INDEX: 15.03 KG/M2 | HEART RATE: 73 BPM | OXYGEN SATURATION: 97 % | DIASTOLIC BLOOD PRESSURE: 64 MMHG | RESPIRATION RATE: 14 BRPM | SYSTOLIC BLOOD PRESSURE: 110 MMHG | WEIGHT: 88 LBS

## 2018-09-10 DIAGNOSIS — R59.0 LEFT CERVICAL LYMPHADENOPATHY: ICD-10-CM

## 2018-09-10 DIAGNOSIS — N28.89 RIGHT RENAL MASS: Primary | ICD-10-CM

## 2018-09-10 DIAGNOSIS — C34.90 MALIGNANT NEOPLASM OF LUNG, UNSPECIFIED LATERALITY, UNSPECIFIED PART OF LUNG (HCC): Primary | ICD-10-CM

## 2018-09-10 DIAGNOSIS — C34.91 SMALL CELL CARCINOMA OF RIGHT LUNG (HCC): ICD-10-CM

## 2018-09-10 LAB
BASOPHILS # BLD AUTO: 0.06 10*3/MM3 (ref 0–0.1)
BASOPHILS NFR BLD AUTO: 0.7 % (ref 0–1.1)
DEPRECATED RDW RBC AUTO: 51.7 FL (ref 37–49)
EOSINOPHIL # BLD AUTO: 0.07 10*3/MM3 (ref 0–0.36)
EOSINOPHIL NFR BLD AUTO: 0.8 % (ref 1–5)
ERYTHROCYTE [DISTWIDTH] IN BLOOD BY AUTOMATED COUNT: 13.2 % (ref 11.7–14.5)
HCT VFR BLD AUTO: 36.7 % (ref 34–45)
HGB BLD-MCNC: 12.6 G/DL (ref 11.5–14.9)
IMM GRANULOCYTES # BLD: 0.03 10*3/MM3 (ref 0–0.03)
IMM GRANULOCYTES NFR BLD: 0.3 % (ref 0–0.5)
LYMPHOCYTES # BLD AUTO: 1.46 10*3/MM3 (ref 1–3.5)
LYMPHOCYTES NFR BLD AUTO: 16 % (ref 20–49)
MCH RBC QN AUTO: 36.6 PG (ref 27–33)
MCHC RBC AUTO-ENTMCNC: 34.3 G/DL (ref 32–35)
MCV RBC AUTO: 106.7 FL (ref 83–97)
MONOCYTES # BLD AUTO: 0.87 10*3/MM3 (ref 0.25–0.8)
MONOCYTES NFR BLD AUTO: 9.5 % (ref 4–12)
NEUTROPHILS # BLD AUTO: 6.66 10*3/MM3 (ref 1.5–7)
NEUTROPHILS NFR BLD AUTO: 72.7 % (ref 39–75)
NRBC BLD MANUAL-RTO: 0 /100 WBC (ref 0–0)
PLATELET # BLD AUTO: 232 10*3/MM3 (ref 150–375)
PMV BLD AUTO: 9.9 FL (ref 8.9–12.1)
RBC # BLD AUTO: 3.44 10*6/MM3 (ref 3.9–5)
WBC NRBC COR # BLD: 9.15 10*3/MM3 (ref 4–10)

## 2018-09-10 PROCEDURE — 99215 OFFICE O/P EST HI 40 MIN: CPT | Performed by: INTERNAL MEDICINE

## 2018-09-10 PROCEDURE — 36415 COLL VENOUS BLD VENIPUNCTURE: CPT | Performed by: INTERNAL MEDICINE

## 2018-09-10 PROCEDURE — 85025 COMPLETE CBC W/AUTO DIFF WBC: CPT | Performed by: INTERNAL MEDICINE

## 2018-09-14 ENCOUNTER — APPOINTMENT (OUTPATIENT)
Dept: RADIATION ONCOLOGY | Facility: HOSPITAL | Age: 63
End: 2018-09-14

## 2018-09-14 ENCOUNTER — CONSULT (OUTPATIENT)
Dept: RADIATION ONCOLOGY | Facility: HOSPITAL | Age: 63
End: 2018-09-14

## 2018-09-14 VITALS
OXYGEN SATURATION: 96 % | HEIGHT: 64 IN | DIASTOLIC BLOOD PRESSURE: 74 MMHG | TEMPERATURE: 99.2 F | SYSTOLIC BLOOD PRESSURE: 125 MMHG | BODY MASS INDEX: 15.03 KG/M2 | WEIGHT: 88 LBS | HEART RATE: 69 BPM

## 2018-09-14 DIAGNOSIS — C34.91 SMALL CELL CARCINOMA OF RIGHT LUNG (HCC): Primary | ICD-10-CM

## 2018-09-14 PROCEDURE — 77263 THER RADIOLOGY TX PLNG CPLX: CPT | Performed by: RADIOLOGY

## 2018-09-14 PROCEDURE — 77334 RADIATION TREATMENT AID(S): CPT | Performed by: RADIOLOGY

## 2018-09-14 PROCEDURE — 77370 RADIATION PHYSICS CONSULT: CPT | Performed by: RADIOLOGY

## 2018-09-14 PROCEDURE — 99204 OFFICE O/P NEW MOD 45 MIN: CPT | Performed by: RADIOLOGY

## 2018-09-14 PROCEDURE — G0463 HOSPITAL OUTPT CLINIC VISIT: HCPCS | Performed by: RADIOLOGY

## 2018-09-14 NOTE — PROGRESS NOTES
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 year old female who presented with complaints of weight loss and cervical lymphadenopathy.  She underwent a CT of the chest on May 1, 2018 which revealed a large heterogeneous mass within the right upper lobe measuring 8.8 x 5.6 cm x 6.6 cm, extension into the posterior chest wall at the right fourth and fifth intercostal space and into the right fourth and fifth neural foramen.  Multiple nodules surrounding the lesion in the right upper lobe were noted and additionally she had a 4 x 3 cm mass at the left upper lobe, an 8 mm nodule in the left apical region, a 6 mm nodule in the left upper lobe and two 1 cm lesions in the right lower lobe and lmphadenopathy was appreciated at the right hilum with a 3 x 2.2 cm left adrenal nodule.      MRI of the brain on May 11, 2018 showed no evidence of metastatic disease and MRI of the thoracic spine showed the 4.1 x 3.2 cm spiculated mass in the left upper lobe with a 9 x 6 x 8 cm mass in the medial right upper lobe extending across the right major fissure invading the paravertebral fat from T3 down to T6.  She underwent an ultrasound-guided biopsy of the left neck mass on May 14, 2018 which revealed neuroendocrine carcinoma with necrosis, predominantly small cell type.    She was seen by the [facility]  physicians and started chemotherapy with carboplatin/-16 on May 22, 2018 and completed 4 cycles.  Follow-up CAT scan of the neck, chest, abdomen and pelvis on July 23, 2018 showed a significant interval decrease in the right upper lobe mass measuring now 4.8 x 3.3 cm with less extension into the posterior chest wall and right fourth and fifth intercostal spaces with less prominent extension into the right T4-5 neural foramen, decrease in the size of the left upper lobe lesion to 2.5 x 1.7 cm and near complete resolution of the 3 cm left adrenal nodule.  She received her final cycle on July 30, 2018.    She was restaged with CAT scans of the neck, chest, abdomen and pelvis on August 13, 2018 which showed no significant change when compared to the end of July with stability of the lung masses and particularly the right upper lobe mass measuring 4.8 x 3.3 cm still with medial extension.  She underwent a bone scan on August 13, 2018 which was negative with no evidence of bony metastases.    She was referred to the [facility] for consideration of clinical trial but she declined this. Local radiation therapy was recommended for the right-sided disease encroaching on the T4 5 vertebral bodies with consideration of treatment of the left lung lesion and for prophylactic cranial radiation.I was asked to see the patient at the request of the referring provider noted above for advice and recommendations regarding this diagnosis.      Clinically she is doing well. She has no pain nor respiratory complaint. She continues to struggle with weight loss but states she is eating as much as she can and denies any nausea, emesis.    Past Medical History: she  has a past medical history of Anxiety; Basal cell carcinoma; Bowen's disease of vulva; Depression; History of kidney stone (2018); Left adrenal mass (CMS/HCC); Lung cancer (CMS/HCC); Lung mass; Mitral valve prolapse; Neuropathy; Right renal mass; Rosacea;  Seasonal allergies; Shingles (2014); Small cell carcinoma (CMS/HCC) (05/14/2018); and Uterine mass.    Past Surgical History:  she has a past surgical history that includes Retinal laser procedure; Breast Augmentation (Bilateral, 1980); US Guided Lymph Node Biopsy (5/14/2018); Vulvectomy (N/A); Cataract extraction w/  intraocular lens implant; Excision basal cell carcinoma; Eye surgery; Venous Access Device (Port) (Right, 5/25/2018); Pars plana vitrectomy; and Breast surgery.    Meds:    Current Outpatient Prescriptions:   •  doxycycline (VIBRAMYCIN) 100 MG capsule, TK 1 C PO BID, Disp: , Rfl: 2  •  lidocaine-prilocaine (EMLA) 2.5-2.5 % cream, Apply quarter size amount to port site 30-45 minutes prior to port access, Disp: 1 each, Rfl: 0  •  Loratadine (CLARITIN CHILDRENS PO), Take 5 mL by mouth Daily., Disp: , Rfl:   •  prochlorperazine (COMPAZINE) 10 MG tablet, Take 1 tablet by mouth Every 8 (Eight) Hours As Needed for Nausea or Vomiting., Disp: 30 tablet, Rfl: 1    Allergies:  No Known Allergies    Family History:  her family history includes Heart disease in her father; No Known Problems in her brother; Other in her mother; Thyroid cancer in her sister.    Social History:  she  reports that she has been smoking Cigarettes.  She started smoking about 47 years ago. She has a 72.00 pack-year smoking history. She has never used smokeless tobacco. She reports that she drinks alcohol. She reports that she does not use drugs.    Pertinent Findings on   Review of Systems   Constitutional: Positive for unexpected weight change. Negative for appetite change, chills, diaphoresis, fatigue and fever.   HENT:   Negative for hearing loss, lump/mass, mouth sores, nosebleeds, sore throat, tinnitus, trouble swallowing and voice change.    Eyes: Positive for eye problems. Negative for icterus.   Respiratory: Negative for chest tightness, cough, hemoptysis, shortness of breath and wheezing.    Cardiovascular: Negative for chest  "pain, leg swelling and palpitations.   Gastrointestinal: Negative for abdominal distention, abdominal pain, blood in stool, constipation, diarrhea, nausea, rectal pain and vomiting.   Endocrine: Negative for hot flashes.   Genitourinary: Negative for bladder incontinence, difficulty urinating, dyspareunia, dysuria, frequency, hematuria, menstrual problem, nocturia, pelvic pain, vaginal bleeding and vaginal discharge.    Musculoskeletal: Negative for arthralgias, back pain, flank pain, gait problem, myalgias, neck pain and neck stiffness.   Skin: Negative for itching, rash and wound.   Neurological: Negative for dizziness, extremity weakness, gait problem, headaches, light-headedness, numbness, seizures and speech difficulty.   Hematological: Negative for adenopathy. Does not bruise/bleed easily.   Psychiatric/Behavioral: Negative for confusion, decreased concentration, depression, sleep disturbance and suicidal ideas. The patient is not nervous/anxious.    :  Vitals:    09/14/18 1059   BP: 125/74   Pulse: 69   Temp: 99.2 °F (37.3 °C)   TempSrc: Oral   SpO2: 96%   Weight: 39.9 kg (88 lb)   Height: 163.5 cm (64.37\")   PainSc: 0-No pain       Performance Status: (1) Restricted in physically strenuous activity, ambulatory and able to do work of light nature    Pertinent Findings on:  Physical Exam   Constitutional: She is oriented to person, place, and time. She appears well-developed and well-nourished. She is active and cooperative. No distress.   HENT:   Head: Normocephalic and atraumatic.   Nose: Nose normal.   Mouth/Throat: Mucous membranes are normal. Normal dentition.   Eyes: Conjunctivae and EOM are normal.   Neck: Normal range of motion.   Pulmonary/Chest: Effort normal.   Abdominal: Normal appearance. There is no hepatosplenomegaly.   Musculoskeletal: Normal range of motion.     Vascular Status -  Her right foot exhibits no edema. Her left foot exhibits no edema.  Neurological: She is alert and oriented to " person, place, and time.   Skin: Skin is warm and dry.   Psychiatric: She has a normal mood and affect. Her behavior is normal. Judgment and thought content normal.       Assessment:   1. Small cell carcinoma of right lung (CMS/HCC)       This assessment comes from my review of the imaging, pathology, physician notes and other pertinent information as mentioned.    Plan:   We reviewed today the details of the imaging studies up to this point and all questions were answered in that regard. There is a good understanding of the extent and stage of the disease - the response to treatment and the areas of continued local concern.  After reviewing all her scans, I am most concerned about the right sided lung lesion with the adjacent involvement. She is currently asymptomatic but certainly this area could become a significant local issue with any progression. We did discuss possible treatment of the left lung lesion and the brain prophylactically but I suggested we begin with the right lung and reassess the other volumes thereafter and she was agreeable.    Therefore, we discussed a course of treatment consisting of approximately 5000 cGy in 25 treatments to the above mentioned areas over a 5 week period of time.  We discussed the logistics of the daily treatment as well as our treatment planning process.      We then discussed the acute side effects, specifically mild irritation of the skin in the treatment area and the small likelihood of increased cough, sore throat, dysphagia and decreased appetite and fatigue.  We discussed the anticipated time frame for the resolution of the above-mentioned symptoms. We then discussed the small but possible long-term possibility of radiation pneumonitis, fibrosis and the possibility of being more short of air at the conclusion of the radiation therapy simply from those benign changes.  We again discussed the importance of our treatment planning process in limiting the volume of lung  treated as much as possible and I believe all questions were answered.     We were able to complete our initial treatment planning scan today. I will be fusing the most recent scans on to our treatment planning scan to insure adequate coverage of the involved area. I am anticipating getting the treatments underway early next week.    I spent greater than 45 minutes in face-to-face time with the patient and 30 minutes of that time was spent in counseling and coordination of care, including review of imaging and pathology; prognosis and differential diagnosis; indications, goals, logistics, benefits and risks of treatment as well as alternatives and surveillance options.

## 2018-09-24 PROCEDURE — 77301 RADIOTHERAPY DOSE PLAN IMRT: CPT | Performed by: RADIOLOGY

## 2018-09-24 PROCEDURE — 77338 DESIGN MLC DEVICE FOR IMRT: CPT | Performed by: RADIOLOGY

## 2018-09-25 PROCEDURE — 77300 RADIATION THERAPY DOSE PLAN: CPT | Performed by: RADIOLOGY

## 2018-09-26 PROCEDURE — 77386 CHG INTENSITY MODULATED RADIATION TX DLVR COMPLEX: CPT | Performed by: RADIOLOGY

## 2018-09-26 PROCEDURE — 77014 CHG CT GUIDANCE RADIATION THERAPY FLDS PLACEMENT: CPT | Performed by: RADIOLOGY

## 2018-09-26 PROCEDURE — 77336 RADIATION PHYSICS CONSULT: CPT | Performed by: RADIOLOGY

## 2018-09-26 PROCEDURE — 77386: CPT | Performed by: RADIOLOGY

## 2018-09-26 PROCEDURE — 77427 RADIATION TX MANAGEMENT X5: CPT | Performed by: RADIOLOGY

## 2018-09-27 PROCEDURE — 77014 CHG CT GUIDANCE RADIATION THERAPY FLDS PLACEMENT: CPT | Performed by: RADIOLOGY

## 2018-09-27 PROCEDURE — 77386: CPT | Performed by: RADIOLOGY

## 2018-09-27 PROCEDURE — 77386 CHG INTENSITY MODULATED RADIATION TX DLVR COMPLEX: CPT | Performed by: RADIOLOGY

## 2018-09-28 PROCEDURE — 77386 CHG INTENSITY MODULATED RADIATION TX DLVR COMPLEX: CPT | Performed by: RADIOLOGY

## 2018-09-28 PROCEDURE — 77014 CHG CT GUIDANCE RADIATION THERAPY FLDS PLACEMENT: CPT | Performed by: RADIOLOGY

## 2018-09-28 PROCEDURE — 77386: CPT | Performed by: RADIOLOGY

## 2018-10-01 ENCOUNTER — APPOINTMENT (OUTPATIENT)
Dept: RADIATION ONCOLOGY | Facility: HOSPITAL | Age: 63
End: 2018-10-01

## 2018-10-01 ENCOUNTER — LAB (OUTPATIENT)
Dept: LAB | Facility: HOSPITAL | Age: 63
End: 2018-10-01

## 2018-10-01 ENCOUNTER — OFFICE VISIT (OUTPATIENT)
Dept: ONCOLOGY | Facility: CLINIC | Age: 63
End: 2018-10-01

## 2018-10-01 VITALS
HEART RATE: 65 BPM | TEMPERATURE: 99.1 F | HEIGHT: 64 IN | WEIGHT: 87.6 LBS | RESPIRATION RATE: 16 BRPM | OXYGEN SATURATION: 97 % | BODY MASS INDEX: 14.95 KG/M2 | DIASTOLIC BLOOD PRESSURE: 76 MMHG | SYSTOLIC BLOOD PRESSURE: 144 MMHG

## 2018-10-01 DIAGNOSIS — C34.90 SMALL CELL LUNG CANCER (HCC): ICD-10-CM

## 2018-10-01 DIAGNOSIS — R59.0 LEFT CERVICAL LYMPHADENOPATHY: ICD-10-CM

## 2018-10-01 DIAGNOSIS — C34.90 MALIGNANT NEOPLASM OF LUNG, UNSPECIFIED LATERALITY, UNSPECIFIED PART OF LUNG (HCC): ICD-10-CM

## 2018-10-01 DIAGNOSIS — C34.90 MALIGNANT NEOPLASM OF LUNG, UNSPECIFIED LATERALITY, UNSPECIFIED PART OF LUNG (HCC): Primary | ICD-10-CM

## 2018-10-01 LAB
ALBUMIN SERPL-MCNC: 4.4 G/DL (ref 3.5–5.2)
ALBUMIN/GLOB SERPL: 1.8 G/DL (ref 1.1–2.4)
ALP SERPL-CCNC: 67 U/L (ref 38–116)
ALT SERPL W P-5'-P-CCNC: 9 U/L (ref 0–33)
ANION GAP SERPL CALCULATED.3IONS-SCNC: 12 MMOL/L
AST SERPL-CCNC: 21 U/L (ref 0–32)
BASOPHILS # BLD AUTO: 0.04 10*3/MM3 (ref 0–0.1)
BASOPHILS NFR BLD AUTO: 0.7 % (ref 0–1.1)
BILIRUB SERPL-MCNC: 0.4 MG/DL (ref 0.1–1.2)
BUN BLD-MCNC: 11 MG/DL (ref 6–20)
BUN/CREAT SERPL: 25.6 (ref 7.3–30)
CALCIUM SPEC-SCNC: 9.6 MG/DL (ref 8.5–10.2)
CHLORIDE SERPL-SCNC: 98 MMOL/L (ref 98–107)
CO2 SERPL-SCNC: 27 MMOL/L (ref 22–29)
CREAT BLD-MCNC: 0.43 MG/DL (ref 0.6–1.1)
DEPRECATED RDW RBC AUTO: 45.1 FL (ref 37–49)
EOSINOPHIL # BLD AUTO: 0.06 10*3/MM3 (ref 0–0.36)
EOSINOPHIL NFR BLD AUTO: 1.1 % (ref 1–5)
ERYTHROCYTE [DISTWIDTH] IN BLOOD BY AUTOMATED COUNT: 11.6 % (ref 11.7–14.5)
GFR SERPL CREATININE-BSD FRML MDRD: 147 ML/MIN/1.73
GLOBULIN UR ELPH-MCNC: 2.4 GM/DL (ref 1.8–3.5)
GLUCOSE BLD-MCNC: 95 MG/DL (ref 74–124)
HCT VFR BLD AUTO: 40.8 % (ref 34–45)
HGB BLD-MCNC: 14 G/DL (ref 11.5–14.9)
IMM GRANULOCYTES # BLD: 0.01 10*3/MM3 (ref 0–0.03)
IMM GRANULOCYTES NFR BLD: 0.2 % (ref 0–0.5)
LYMPHOCYTES # BLD AUTO: 1.1 10*3/MM3 (ref 1–3.5)
LYMPHOCYTES NFR BLD AUTO: 19.8 % (ref 20–49)
MCH RBC QN AUTO: 36.4 PG (ref 27–33)
MCHC RBC AUTO-ENTMCNC: 34.3 G/DL (ref 32–35)
MCV RBC AUTO: 106 FL (ref 83–97)
MONOCYTES # BLD AUTO: 0.55 10*3/MM3 (ref 0.25–0.8)
MONOCYTES NFR BLD AUTO: 9.9 % (ref 4–12)
NEUTROPHILS # BLD AUTO: 3.79 10*3/MM3 (ref 1.5–7)
NEUTROPHILS NFR BLD AUTO: 68.3 % (ref 39–75)
NRBC BLD MANUAL-RTO: 0 /100 WBC (ref 0–0)
PLATELET # BLD AUTO: 222 10*3/MM3 (ref 150–375)
PMV BLD AUTO: 9.5 FL (ref 8.9–12.1)
POTASSIUM BLD-SCNC: 3.9 MMOL/L (ref 3.5–4.7)
PROT SERPL-MCNC: 6.8 G/DL (ref 6.3–8)
RBC # BLD AUTO: 3.85 10*6/MM3 (ref 3.9–5)
SODIUM BLD-SCNC: 137 MMOL/L (ref 134–145)
WBC NRBC COR # BLD: 5.55 10*3/MM3 (ref 4–10)

## 2018-10-01 PROCEDURE — 77014 CHG CT GUIDANCE RADIATION THERAPY FLDS PLACEMENT: CPT | Performed by: RADIOLOGY

## 2018-10-01 PROCEDURE — 77386: CPT | Performed by: RADIOLOGY

## 2018-10-01 PROCEDURE — 80053 COMPREHEN METABOLIC PANEL: CPT | Performed by: INTERNAL MEDICINE

## 2018-10-01 PROCEDURE — 77386 CHG INTENSITY MODULATED RADIATION TX DLVR COMPLEX: CPT | Performed by: RADIOLOGY

## 2018-10-01 PROCEDURE — 99214 OFFICE O/P EST MOD 30 MIN: CPT | Performed by: INTERNAL MEDICINE

## 2018-10-01 PROCEDURE — 85025 COMPLETE CBC W/AUTO DIFF WBC: CPT | Performed by: INTERNAL MEDICINE

## 2018-10-01 PROCEDURE — 36415 COLL VENOUS BLD VENIPUNCTURE: CPT | Performed by: INTERNAL MEDICINE

## 2018-10-01 RX ORDER — INFLUENZA A VIRUS A/SINGAPORE/GP1908/2015 IVR-180A (H1N1) ANTIGEN (PROPIOLACTONE INACTIVATED), INFLUENZA A VIRUS A/SINGAPORE/INFIMH-16-0019/2016 IVR-186 (H3N2) ANTIGEN (PROPIOLACTONE INACTIVATED), INFLUENZA B VIRUS B/MARYLAND/15/2016 ANTIGEN (PROPIOLACTONE INACTIVATED), AND INFLUENZA B VIRUS B/PHUKET/3073/2013 BVR-1B ANTIGEN (PROPIOLACTONE INACTIVATED) 15; 15; 15; 15 UG/.5ML; UG/.5ML; UG/.5ML; UG/.5ML
INJECTION, SUSPENSION INTRAMUSCULAR
Refills: 0 | COMMUNITY
Start: 2018-09-16 | End: 2018-12-24

## 2018-10-02 PROCEDURE — 77386 CHG INTENSITY MODULATED RADIATION TX DLVR COMPLEX: CPT | Performed by: RADIOLOGY

## 2018-10-02 PROCEDURE — 77014 CHG CT GUIDANCE RADIATION THERAPY FLDS PLACEMENT: CPT | Performed by: RADIOLOGY

## 2018-10-02 PROCEDURE — 77386: CPT | Performed by: RADIOLOGY

## 2018-10-03 PROCEDURE — 77386 CHG INTENSITY MODULATED RADIATION TX DLVR COMPLEX: CPT | Performed by: RADIOLOGY

## 2018-10-03 PROCEDURE — 77427 RADIATION TX MANAGEMENT X5: CPT | Performed by: RADIOLOGY

## 2018-10-03 PROCEDURE — 77014 CHG CT GUIDANCE RADIATION THERAPY FLDS PLACEMENT: CPT | Performed by: RADIOLOGY

## 2018-10-03 PROCEDURE — 77336 RADIATION PHYSICS CONSULT: CPT | Performed by: RADIOLOGY

## 2018-10-03 PROCEDURE — 77386: CPT | Performed by: RADIOLOGY

## 2018-10-04 ENCOUNTER — RADIATION ONCOLOGY WEEKLY ASSESSMENT (OUTPATIENT)
Dept: RADIATION ONCOLOGY | Facility: HOSPITAL | Age: 63
End: 2018-10-04

## 2018-10-04 VITALS
TEMPERATURE: 98.5 F | OXYGEN SATURATION: 97 % | SYSTOLIC BLOOD PRESSURE: 127 MMHG | BODY MASS INDEX: 14.85 KG/M2 | WEIGHT: 87 LBS | HEIGHT: 64 IN | HEART RATE: 72 BPM | DIASTOLIC BLOOD PRESSURE: 68 MMHG

## 2018-10-04 DIAGNOSIS — C34.90 SMALL CELL LUNG CANCER (HCC): Primary | ICD-10-CM

## 2018-10-04 PROCEDURE — 77014 CHG CT GUIDANCE RADIATION THERAPY FLDS PLACEMENT: CPT | Performed by: RADIOLOGY

## 2018-10-04 PROCEDURE — 77386: CPT | Performed by: RADIOLOGY

## 2018-10-04 PROCEDURE — 77386 CHG INTENSITY MODULATED RADIATION TX DLVR COMPLEX: CPT | Performed by: RADIOLOGY

## 2018-10-04 NOTE — PROGRESS NOTES
"  Physician Weekly Management Note    Diagnosis:     1. Small cell lung cancer (CMS/HCC)    No matching staging information was found for the patient.    Reason for Visit:   Radiation (7/25)    Concurrent Chemo:   Yes    Notes on Treatment course, Films, Medical progress and Plan:  Doing well. Trying to eat more but not gaining weight. No problems or questions, cont on.    ROS - Other than as listed above, as follows:  Constitutional - Normal - no complaints of fatigue, denies lack of appetite, fever, night sweats or change in weight.  Neck - Normal - denies neck masses, muscle weakness, neck pain, decreased range of motion or swelling.  Cardiovascular - Normal - denies arrhythmias, chest pain, dyspnea, edema, orthopnea or palpitations.  Respiratory - Normal - denies cough, dyspnea, hemoptysis, hiccoughs, pleuritic chest pain or wheezing.  Gastrointestinal - Normal - no complaints of constipation, abdominal pain, diarrhea, heartburn/dyspepsia, hematemesis, hemorrhoids, melena or GI bleeding, nausea, pain or cramping or vomiting.    PHYSICAL EXAM - Other than as listed above, as follows:  Vitals:    10/04/18 0959   BP: 127/68   Pulse: 72   Temp: 98.5 °F (36.9 °C)   TempSrc: Oral   SpO2: 97%   Weight: 39.5 kg (87 lb)   Height: 163.5 cm (64.37\")       Constitutional - Normal - no evidence of impaired alertness, inadequate appearance, premature or advanced chronologic age, uncooperativeness, altered mood, affect or disorientation.  Neck - Normal - no evidence of tender or enlarged lymph nodes, neck abnormalities, restricted range of motion or enlarged thyroid.  Chest - Normal - no evidence of chest abnormalities, tender or enlarged lymph nodes.  Respiratory - Normal - no evidence of abnormal breat sounds, chest abnormalities on palpation and chest abnormalities on percussion and normal breath sounds.  Hematologic/Lymphatic - Normal - no evidence of tender or enlarged axillary lymph nodes nor tender or enlarged neck " "nodes.    Performance Status:  (1) Restricted in physically strenuous activity, ambulatory and able to do work of light nature    Problem added:  No problems updated.  Medications added: No orders of the defined types were placed in this encounter.    Ancillary referrals made: No orders of the defined types were placed in this encounter.      Technical aspects reviewed:  Weekly OBI approved if applicable? Yes  Weekly port films approved?   Yes  Change requests noted if applicable?  No  Patient setup and plan reviewed?  Yes    Chart Reviewed:  Continue current treatment plan?   Yes  Treatment plan change requested?  No    I have reviewed and marked as \"reviewed\" the current medications, allergies and problem list in the patients EMR.  I have reviewed the patient's medical, surgical  history in detail, reviewed any pertinent lab work and updated the computerized patient record if needed.    Patient's Care Team:  Patient Care Team:  Bernie Francois MD as PCP - General (Family Medicine)  Leyla Canada MD as PCP - [facility], Kevin RDZ III, MD as Referring Physician (Thoracic Surgery)  Leyla Canada MD as Consulting Physician (Hematology and Oncology)  Tracie Blair MD as Consulting Physician (Radiation Oncology)    Seen and approved by:  Tracie Blair MD, 10/04/2018  "

## 2018-10-05 PROCEDURE — 77386 CHG INTENSITY MODULATED RADIATION TX DLVR COMPLEX: CPT | Performed by: RADIOLOGY

## 2018-10-05 PROCEDURE — 77014 CHG CT GUIDANCE RADIATION THERAPY FLDS PLACEMENT: CPT | Performed by: RADIOLOGY

## 2018-10-05 PROCEDURE — 77386: CPT | Performed by: RADIOLOGY

## 2018-10-08 PROCEDURE — 77014 CHG CT GUIDANCE RADIATION THERAPY FLDS PLACEMENT: CPT | Performed by: RADIOLOGY

## 2018-10-08 PROCEDURE — 77386 CHG INTENSITY MODULATED RADIATION TX DLVR COMPLEX: CPT | Performed by: RADIOLOGY

## 2018-10-08 PROCEDURE — 77386: CPT | Performed by: RADIOLOGY

## 2018-10-09 ENCOUNTER — RADIATION ONCOLOGY WEEKLY ASSESSMENT (OUTPATIENT)
Dept: RADIATION ONCOLOGY | Facility: HOSPITAL | Age: 63
End: 2018-10-09

## 2018-10-09 VITALS
WEIGHT: 89 LBS | HEART RATE: 66 BPM | SYSTOLIC BLOOD PRESSURE: 124 MMHG | HEIGHT: 64 IN | TEMPERATURE: 99.5 F | DIASTOLIC BLOOD PRESSURE: 73 MMHG | BODY MASS INDEX: 15.19 KG/M2 | OXYGEN SATURATION: 95 %

## 2018-10-09 DIAGNOSIS — C80.1 ADENOCARCINOMA (HCC): Primary | ICD-10-CM

## 2018-10-09 PROCEDURE — 77386: CPT | Performed by: RADIOLOGY

## 2018-10-09 PROCEDURE — 77386 CHG INTENSITY MODULATED RADIATION TX DLVR COMPLEX: CPT | Performed by: RADIOLOGY

## 2018-10-09 PROCEDURE — 77014 CHG CT GUIDANCE RADIATION THERAPY FLDS PLACEMENT: CPT | Performed by: RADIOLOGY

## 2018-10-09 NOTE — PROGRESS NOTES
"  Physician Weekly Management Note    Diagnosis:     1. Adenocarcinoma (CMS/HCC)      Reason for Visit:   Radiation (10/25)    Concurrent Chemo:   Yes    Notes on Treatment course, Films, Medical progress and Plan:  Doing better. Eating better and gained a bit. No problems or questions, cont on.    ROS - Other than as listed above, as follows:  Constitutional - Normal - no complaints of fatigue, denies lack of appetite, fever, night sweats or change in weight.  Neck - Normal - denies neck masses, muscle weakness, neck pain, decreased range of motion or swelling.  Cardiovascular - Normal - denies arrhythmias, chest pain, dyspnea, edema, orthopnea or palpitations.  Respiratory - Normal - denies cough, dyspnea, hemoptysis, hiccoughs, pleuritic chest pain or wheezing.  Gastrointestinal - Normal - no complaints of constipation, abdominal pain, diarrhea, heartburn/dyspepsia, hematemesis, hemorrhoids, melena or GI bleeding, nausea, pain or cramping or vomiting.    PHYSICAL EXAM - Other than as listed above, as follows:  Vitals:    10/09/18 1011   BP: 124/73   Pulse: 66   Temp: 99.5 °F (37.5 °C)   TempSrc: Oral   SpO2: 95%   Weight: 40.4 kg (89 lb)   Height: 163.5 cm (64.37\")   PainSc: 0-No pain       Constitutional - Normal - no evidence of impaired alertness, inadequate appearance, premature or advanced chronologic age, uncooperativeness, altered mood, affect or disorientation.  Neck - Normal - no evidence of tender or enlarged lymph nodes, neck abnormalities, restricted range of motion or enlarged thyroid.  Chest - Normal - no evidence of chest abnormalities, tender or enlarged lymph nodes.  Respiratory - Normal - no evidence of abnormal breat sounds, chest abnormalities on palpation and chest abnormalities on percussion and normal breath sounds.  Hematologic/Lymphatic - Normal - no evidence of tender or enlarged axillary lymph nodes nor tender or enlarged neck nodes.    Performance Status:  (1) Restricted in physically " "strenuous activity, ambulatory and able to do work of light nature    Problem added:  No problems updated.  Medications added: No orders of the defined types were placed in this encounter.    Ancillary referrals made: No orders of the defined types were placed in this encounter.      Technical aspects reviewed:  Weekly OBI approved if applicable? Yes  Weekly port films approved?   Yes  Change requests noted if applicable?  No  Patient setup and plan reviewed?  Yes    Chart Reviewed:  Continue current treatment plan?   Yes  Treatment plan change requested?  No    I have reviewed and marked as \"reviewed\" the current medications, allergies and problem list in the patients EMR.  I have reviewed the patient's medical, surgical  history in detail, reviewed any pertinent lab work and updated the computerized patient record if needed.    Patient's Care Team:  Patient Care Team:  Bernie Francois MD as PCP - General (Family Medicine)  Leyla Canada MD as PCP - [facility], Kevin RDZ III, MD as Referring Physician (Thoracic Surgery)  Leyla Canada MD as Consulting Physician (Hematology and Oncology)  Tracie Blair MD as Consulting Physician (Radiation Oncology)    Seen and approved by:  Tracie Blair MD, 10/04/2018  "

## 2018-10-10 PROCEDURE — 77386: CPT | Performed by: RADIOLOGY

## 2018-10-10 PROCEDURE — 77014 CHG CT GUIDANCE RADIATION THERAPY FLDS PLACEMENT: CPT | Performed by: RADIOLOGY

## 2018-10-10 PROCEDURE — 77427 RADIATION TX MANAGEMENT X5: CPT | Performed by: RADIOLOGY

## 2018-10-10 PROCEDURE — 77336 RADIATION PHYSICS CONSULT: CPT | Performed by: RADIOLOGY

## 2018-10-10 PROCEDURE — 77386 CHG INTENSITY MODULATED RADIATION TX DLVR COMPLEX: CPT | Performed by: RADIOLOGY

## 2018-10-11 PROCEDURE — 77014 CHG CT GUIDANCE RADIATION THERAPY FLDS PLACEMENT: CPT | Performed by: RADIOLOGY

## 2018-10-11 PROCEDURE — 77386: CPT | Performed by: RADIOLOGY

## 2018-10-11 PROCEDURE — 77386 CHG INTENSITY MODULATED RADIATION TX DLVR COMPLEX: CPT | Performed by: RADIOLOGY

## 2018-10-12 PROCEDURE — 77386: CPT | Performed by: RADIOLOGY

## 2018-10-12 PROCEDURE — 77386 CHG INTENSITY MODULATED RADIATION TX DLVR COMPLEX: CPT | Performed by: RADIOLOGY

## 2018-10-12 PROCEDURE — 77014 CHG CT GUIDANCE RADIATION THERAPY FLDS PLACEMENT: CPT | Performed by: RADIOLOGY

## 2018-10-15 PROCEDURE — 77014 CHG CT GUIDANCE RADIATION THERAPY FLDS PLACEMENT: CPT | Performed by: RADIOLOGY

## 2018-10-15 PROCEDURE — 77386: CPT | Performed by: RADIOLOGY

## 2018-10-15 PROCEDURE — 77386 CHG INTENSITY MODULATED RADIATION TX DLVR COMPLEX: CPT | Performed by: RADIOLOGY

## 2018-10-16 ENCOUNTER — RADIATION ONCOLOGY WEEKLY ASSESSMENT (OUTPATIENT)
Dept: RADIATION ONCOLOGY | Facility: HOSPITAL | Age: 63
End: 2018-10-16

## 2018-10-16 VITALS
BODY MASS INDEX: 15.19 KG/M2 | SYSTOLIC BLOOD PRESSURE: 125 MMHG | TEMPERATURE: 99.4 F | WEIGHT: 89 LBS | HEART RATE: 70 BPM | DIASTOLIC BLOOD PRESSURE: 65 MMHG | HEIGHT: 64 IN | OXYGEN SATURATION: 96 %

## 2018-10-16 DIAGNOSIS — C80.1 ADENOCARCINOMA (HCC): Primary | ICD-10-CM

## 2018-10-16 PROCEDURE — 77014 CHG CT GUIDANCE RADIATION THERAPY FLDS PLACEMENT: CPT | Performed by: RADIOLOGY

## 2018-10-16 PROCEDURE — 77386: CPT | Performed by: RADIOLOGY

## 2018-10-16 PROCEDURE — 77386 CHG INTENSITY MODULATED RADIATION TX DLVR COMPLEX: CPT | Performed by: RADIOLOGY

## 2018-10-16 NOTE — PROGRESS NOTES
"  Physician Weekly Management Note    Diagnosis:     1. Adenocarcinoma (CMS/HCC)      Reason for Visit:   Radiation (15/25)    Concurrent Chemo:   Yes    Notes on Treatment course, Films, Medical progress and Plan:  Doing better. Eating better and gained a bit more. Energy actually better. No problems or questions, cont on.    ROS - Other than as listed above, as follows:  Constitutional - Normal - no complaints of fatigue, denies lack of appetite, fever, night sweats or change in weight.  Neck - Normal - denies neck masses, muscle weakness, neck pain, decreased range of motion or swelling.  Cardiovascular - Normal - denies arrhythmias, chest pain, dyspnea, edema, orthopnea or palpitations.  Respiratory - Normal - denies cough, dyspnea, hemoptysis, hiccoughs, pleuritic chest pain or wheezing.  Gastrointestinal - Normal - no complaints of constipation, abdominal pain, diarrhea, heartburn/dyspepsia, hematemesis, hemorrhoids, melena or GI bleeding, nausea, pain or cramping or vomiting.    PHYSICAL EXAM - Other than as listed above, as follows:  Vitals:    10/16/18 1003   BP: 125/65   Pulse: 70   Temp: 99.4 °F (37.4 °C)   TempSrc: Oral   SpO2: 96%   Weight: 40.4 kg (89 lb)   Height: 163.5 cm (64.37\")   PainSc: 0-No pain       Constitutional - Normal - no evidence of impaired alertness, inadequate appearance, premature or advanced chronologic age, uncooperativeness, altered mood, affect or disorientation.  Neck - Normal - no evidence of tender or enlarged lymph nodes, neck abnormalities, restricted range of motion or enlarged thyroid.  Chest - Normal - no evidence of chest abnormalities, tender or enlarged lymph nodes.  Respiratory - Normal - no evidence of abnormal breat sounds, chest abnormalities on palpation and chest abnormalities on percussion and normal breath sounds.  Hematologic/Lymphatic - Normal - no evidence of tender or enlarged axillary lymph nodes nor tender or enlarged neck nodes.    Performance Status:  " "(1) Restricted in physically strenuous activity, ambulatory and able to do work of light nature    Problem added:  No problems updated.  Medications added: No orders of the defined types were placed in this encounter.    Ancillary referrals made: No orders of the defined types were placed in this encounter.      Technical aspects reviewed:  Weekly OBI approved if applicable? Yes  Weekly port films approved?   Yes  Change requests noted if applicable?  No  Patient setup and plan reviewed?  Yes    Chart Reviewed:  Continue current treatment plan?   Yes  Treatment plan change requested?  No    I have reviewed and marked as \"reviewed\" the current medications, allergies and problem list in the patients EMR.  I have reviewed the patient's medical, surgical  history in detail, reviewed any pertinent lab work and updated the computerized patient record if needed.    Patient's Care Team:  Patient Care Team:  Bernie Francois MD as PCP - General (Family Medicine)  Leyla Canada MD as PCP - [facility]  Kevin Heath III, MD as Referring Physician (Thoracic Surgery)  Leyla Canada MD as Consulting Physician (Hematology and Oncology)  Tracie Blair MD as Consulting Physician (Radiation Oncology)    Seen and approved by:  Tracie Blair MD, 10/04/2018  "

## 2018-10-17 PROCEDURE — 77014 CHG CT GUIDANCE RADIATION THERAPY FLDS PLACEMENT: CPT | Performed by: RADIOLOGY

## 2018-10-17 PROCEDURE — 77336 RADIATION PHYSICS CONSULT: CPT | Performed by: RADIOLOGY

## 2018-10-17 PROCEDURE — 77386: CPT | Performed by: RADIOLOGY

## 2018-10-17 PROCEDURE — 77427 RADIATION TX MANAGEMENT X5: CPT | Performed by: RADIOLOGY

## 2018-10-17 PROCEDURE — 77386 CHG INTENSITY MODULATED RADIATION TX DLVR COMPLEX: CPT | Performed by: RADIOLOGY

## 2018-10-18 PROCEDURE — 77386 CHG INTENSITY MODULATED RADIATION TX DLVR COMPLEX: CPT | Performed by: RADIOLOGY

## 2018-10-18 PROCEDURE — 77014 CHG CT GUIDANCE RADIATION THERAPY FLDS PLACEMENT: CPT | Performed by: RADIOLOGY

## 2018-10-18 PROCEDURE — 77386: CPT | Performed by: RADIOLOGY

## 2018-10-19 PROCEDURE — 77386 CHG INTENSITY MODULATED RADIATION TX DLVR COMPLEX: CPT | Performed by: RADIOLOGY

## 2018-10-19 PROCEDURE — 77386: CPT | Performed by: RADIOLOGY

## 2018-10-19 PROCEDURE — 77014 CHG CT GUIDANCE RADIATION THERAPY FLDS PLACEMENT: CPT | Performed by: RADIOLOGY

## 2018-10-22 ENCOUNTER — RADIATION ONCOLOGY WEEKLY ASSESSMENT (OUTPATIENT)
Dept: RADIATION ONCOLOGY | Facility: HOSPITAL | Age: 63
End: 2018-10-22

## 2018-10-22 VITALS
WEIGHT: 88 LBS | BODY MASS INDEX: 15.03 KG/M2 | OXYGEN SATURATION: 98 % | HEART RATE: 64 BPM | SYSTOLIC BLOOD PRESSURE: 132 MMHG | HEIGHT: 64 IN | TEMPERATURE: 98.5 F | DIASTOLIC BLOOD PRESSURE: 72 MMHG

## 2018-10-22 DIAGNOSIS — C80.1 ADENOCARCINOMA (HCC): Primary | ICD-10-CM

## 2018-10-22 PROCEDURE — 77014 CHG CT GUIDANCE RADIATION THERAPY FLDS PLACEMENT: CPT | Performed by: RADIOLOGY

## 2018-10-22 PROCEDURE — 77386: CPT | Performed by: RADIOLOGY

## 2018-10-22 PROCEDURE — 77386 CHG INTENSITY MODULATED RADIATION TX DLVR COMPLEX: CPT | Performed by: RADIOLOGY

## 2018-10-22 NOTE — PROGRESS NOTES
"  Physician Weekly Management Note    Diagnosis:     1. Adenocarcinoma (CMS/HCC)      Reason for Visit:   Radiation (19/25)    Concurrent Chemo:   Yes    Notes on Treatment course, Films, Medical progress and Plan:  Doing well. Eating well, drinking lots of water. Energy stable. No problems or questions, cont on.    ROS - Other than as listed above, as follows:  Constitutional - Normal - no complaints of fatigue, denies lack of appetite, fever, night sweats or change in weight.  Neck - Normal - denies neck masses, muscle weakness, neck pain, decreased range of motion or swelling.  Cardiovascular - Normal - denies arrhythmias, chest pain, dyspnea, edema, orthopnea or palpitations.  Respiratory - Normal - denies cough, dyspnea, hemoptysis, hiccoughs, pleuritic chest pain or wheezing.  Gastrointestinal - Normal - no complaints of constipation, abdominal pain, diarrhea, heartburn/dyspepsia, hematemesis, hemorrhoids, melena or GI bleeding, nausea, pain or cramping or vomiting.    PHYSICAL EXAM - Other than as listed above, as follows:  Vitals:    10/22/18 1016   BP: 132/72   Pulse: 64   Temp: 98.5 °F (36.9 °C)   TempSrc: Tympanic   SpO2: 98%   Weight: 39.9 kg (88 lb)   Height: 163.5 cm (64.37\")   PainSc: 0-No pain       Constitutional - Normal - no evidence of impaired alertness, inadequate appearance, premature or advanced chronologic age, uncooperativeness, altered mood, affect or disorientation.  Neck - Normal - no evidence of tender or enlarged lymph nodes, neck abnormalities, restricted range of motion or enlarged thyroid.  Chest - Normal - no evidence of chest abnormalities, tender or enlarged lymph nodes.  Respiratory - Normal - no evidence of abnormal breat sounds, chest abnormalities on palpation and chest abnormalities on percussion and normal breath sounds.  Hematologic/Lymphatic - Normal - no evidence of tender or enlarged axillary lymph nodes nor tender or enlarged neck nodes.    Performance Status:  (1) " "Restricted in physically strenuous activity, ambulatory and able to do work of light nature    Problem added:  No problems updated.  Medications added: No orders of the defined types were placed in this encounter.    Ancillary referrals made: No orders of the defined types were placed in this encounter.      Technical aspects reviewed:  Weekly OBI approved if applicable? Yes  Weekly port films approved?   Yes  Change requests noted if applicable?  No  Patient setup and plan reviewed?  Yes    Chart Reviewed:  Continue current treatment plan?   Yes  Treatment plan change requested?  No    I have reviewed and marked as \"reviewed\" the current medications, allergies and problem list in the patients EMR.  I have reviewed the patient's medical, surgical  history in detail, reviewed any pertinent lab work and updated the computerized patient record if needed.    Patient's Care Team:  Patient Care Team:  Bernie Francois MD as PCP - General (Family Medicine)  Leyla Canada MD as PCP - [facility], Kevin RDZ III, MD as Referring Physician (Thoracic Surgery)  Leyla Canada MD as Consulting Physician (Hematology and Oncology)  Tracie Blair MD as Consulting Physician (Radiation Oncology)    Seen and approved by:  Tracie Blair MD, 10/04/2018  "

## 2018-10-23 PROCEDURE — 77386: CPT | Performed by: RADIOLOGY

## 2018-10-23 PROCEDURE — 77014 CHG CT GUIDANCE RADIATION THERAPY FLDS PLACEMENT: CPT | Performed by: RADIOLOGY

## 2018-10-23 PROCEDURE — 77386 CHG INTENSITY MODULATED RADIATION TX DLVR COMPLEX: CPT | Performed by: RADIOLOGY

## 2018-10-24 PROCEDURE — 77386 CHG INTENSITY MODULATED RADIATION TX DLVR COMPLEX: CPT | Performed by: RADIOLOGY

## 2018-10-24 PROCEDURE — 77427 RADIATION TX MANAGEMENT X5: CPT | Performed by: RADIOLOGY

## 2018-10-24 PROCEDURE — 77386: CPT | Performed by: RADIOLOGY

## 2018-10-24 PROCEDURE — 77014 CHG CT GUIDANCE RADIATION THERAPY FLDS PLACEMENT: CPT | Performed by: RADIOLOGY

## 2018-10-24 PROCEDURE — 77336 RADIATION PHYSICS CONSULT: CPT | Performed by: RADIOLOGY

## 2018-10-25 PROCEDURE — 77014 CHG CT GUIDANCE RADIATION THERAPY FLDS PLACEMENT: CPT | Performed by: RADIOLOGY

## 2018-10-25 PROCEDURE — 77386: CPT | Performed by: RADIOLOGY

## 2018-10-25 PROCEDURE — 77386 CHG INTENSITY MODULATED RADIATION TX DLVR COMPLEX: CPT | Performed by: RADIOLOGY

## 2018-10-26 PROCEDURE — 77386: CPT | Performed by: RADIOLOGY

## 2018-10-26 PROCEDURE — 77386 CHG INTENSITY MODULATED RADIATION TX DLVR COMPLEX: CPT | Performed by: RADIOLOGY

## 2018-10-26 PROCEDURE — 77014 CHG CT GUIDANCE RADIATION THERAPY FLDS PLACEMENT: CPT | Performed by: RADIOLOGY

## 2018-10-29 ENCOUNTER — RADIATION ONCOLOGY WEEKLY ASSESSMENT (OUTPATIENT)
Dept: RADIATION ONCOLOGY | Facility: HOSPITAL | Age: 63
End: 2018-10-29

## 2018-10-29 VITALS
HEART RATE: 67 BPM | SYSTOLIC BLOOD PRESSURE: 123 MMHG | TEMPERATURE: 98.9 F | WEIGHT: 89 LBS | BODY MASS INDEX: 15.19 KG/M2 | HEIGHT: 64 IN | OXYGEN SATURATION: 99 % | DIASTOLIC BLOOD PRESSURE: 83 MMHG

## 2018-10-29 DIAGNOSIS — C34.90 SMALL CELL LUNG CANCER (HCC): Primary | ICD-10-CM

## 2018-10-29 PROCEDURE — 77014 CHG CT GUIDANCE RADIATION THERAPY FLDS PLACEMENT: CPT | Performed by: RADIOLOGY

## 2018-10-29 PROCEDURE — 77386: CPT | Performed by: RADIOLOGY

## 2018-10-29 PROCEDURE — 77386 CHG INTENSITY MODULATED RADIATION TX DLVR COMPLEX: CPT | Performed by: RADIOLOGY

## 2018-10-29 NOTE — PROGRESS NOTES
"  Physician Weekly Management Note    Diagnosis:     1. Small cell lung cancer (CMS/HCC)      Reason for Visit:   Radiation (24/25)    Concurrent Chemo:   Yes    Notes on Treatment course, Films, Medical progress and Plan:  Doing very well. Eating well, energy stable. No problems or questions, goals tomorrow. Discussed follow up imaging with Dr. BERNAL after visit on 11/26. Call if problems.     ROS - Other than as listed above, as follows:  Constitutional - Normal - no complaints of fatigue, denies lack of appetite, fever, night sweats or change in weight.  Neck - Normal - denies neck masses, muscle weakness, neck pain, decreased range of motion or swelling.  Cardiovascular - Normal - denies arrhythmias, chest pain, dyspnea, edema, orthopnea or palpitations.  Respiratory - Normal - denies cough, dyspnea, hemoptysis, hiccoughs, pleuritic chest pain or wheezing.  Gastrointestinal - Normal - no complaints of constipation, abdominal pain, diarrhea, heartburn/dyspepsia, hematemesis, hemorrhoids, melena or GI bleeding, nausea, pain or cramping or vomiting.    PHYSICAL EXAM - Other than as listed above, as follows:  Vitals:    10/29/18 1015   BP: 123/83   Pulse: 67   Temp: 98.9 °F (37.2 °C)   TempSrc: Oral   SpO2: 99%   Weight: 40.4 kg (89 lb)   Height: 163.5 cm (64.37\")   PainSc: 0-No pain       Constitutional - Normal - no evidence of impaired alertness, inadequate appearance, premature or advanced chronologic age, uncooperativeness, altered mood, affect or disorientation.  Neck - Normal - no evidence of tender or enlarged lymph nodes, neck abnormalities, restricted range of motion or enlarged thyroid.  Chest - Normal - no evidence of chest abnormalities, tender or enlarged lymph nodes.  Respiratory - Normal - no evidence of abnormal breat sounds, chest abnormalities on palpation and chest abnormalities on percussion and normal breath sounds.  Hematologic/Lymphatic - Normal - no evidence of tender or enlarged axillary lymph " "nodes nor tender or enlarged neck nodes.    Performance Status:  (1) Restricted in physically strenuous activity, ambulatory and able to do work of light nature    Problem added:  No problems updated.  Medications added: No orders of the defined types were placed in this encounter.    Ancillary referrals made: No orders of the defined types were placed in this encounter.      Technical aspects reviewed:  Weekly OBI approved if applicable? Yes  Weekly port films approved?   Yes  Change requests noted if applicable?  No  Patient setup and plan reviewed?  Yes    Chart Reviewed:  Continue current treatment plan?   Yes  Treatment plan change requested?  No    I have reviewed and marked as \"reviewed\" the current medications, allergies and problem list in the patients EMR.  I have reviewed the patient's medical, surgical  history in detail, reviewed any pertinent lab work and updated the computerized patient record if needed.    Patient's Care Team:  Patient Care Team:  Bernie Francois MD as PCP - General (Family Medicine)  Leyla Canada MD as PCP - [facility]  Kevin Heath III, MD as Referring Physician (Thoracic Surgery)  Leyla Canada MD as Consulting Physician (Hematology and Oncology)  Tracie Blair MD as Consulting Physician (Radiation Oncology)      "

## 2018-10-30 PROCEDURE — 77386: CPT | Performed by: RADIOLOGY

## 2018-10-30 PROCEDURE — 77014 CHG CT GUIDANCE RADIATION THERAPY FLDS PLACEMENT: CPT | Performed by: RADIOLOGY

## 2018-10-30 PROCEDURE — 77386 CHG INTENSITY MODULATED RADIATION TX DLVR COMPLEX: CPT | Performed by: RADIOLOGY

## 2018-11-01 ENCOUNTER — DOCUMENTATION (OUTPATIENT)
Dept: RADIATION ONCOLOGY | Facility: HOSPITAL | Age: 63
End: 2018-11-01

## 2018-11-01 NOTE — PROGRESS NOTES
RADIATION THERAPY TREATMENT SUMMARY  Diagnosis: Small cell lung cancer          Patient Care Team:  Bernie Francois MD as PCP - General (Family Medicine)  Leyla Canada MD as PCP - [facility]  Kevin Heath III, MD as Referring Physician (Thoracic Surgery)  Leyla Canada MD as Consulting Physician (Hematology and Oncology)  Tracie Blair MD as Consulting Physician (Radiation Oncology)    Sujata Waters has recently completed the course of radiation therapy prescribed for the above-mentioned diagnosis.  Below, please find the specifics of the course of treatment delivered:    Radiation Details:    Dates of treatment:  9/26/2018 - 10/30/2018.  Treatment Site - RIGHT LUNG  Treatment Intent - Curative  Total Dose in cGy - 5000  Number of Treatments - 25  Dose per fraction - 200 cGy per fraction  Fractions per day - 1 fx/day  Fractions per week - 5 fx/week  Treatment Type - Rapid Arc  Energy - 6 MVP  Normalization - Prescribed at 100%, Volumetric Normalization-- see below, See below  Imaging/Field Verification - Simulation before first treatment to verify field, blocking, placement and positioning, Simulation for all ports of change, Weekly port films of all ports, IGRT using CBCT daily    Tolerance and Toxicities: She tolerated the treatments well, with an improvement in her eating and performance status, requiring no treatment breaks. She completed the treatments in 34 elapsed days.    Follow-up Plans: The patient has continued follow up set up with Dr. Canada for November 26, 2018 with plans for CT chest and MRI brain at that time. Will review then and see back as needed and to consider PCI if warranted. Patient knows to call if we could be of further help.

## 2018-11-26 ENCOUNTER — LAB (OUTPATIENT)
Dept: LAB | Facility: HOSPITAL | Age: 63
End: 2018-11-26

## 2018-11-26 ENCOUNTER — OFFICE VISIT (OUTPATIENT)
Dept: ONCOLOGY | Facility: CLINIC | Age: 63
End: 2018-11-26

## 2018-11-26 VITALS
OXYGEN SATURATION: 92 % | WEIGHT: 91 LBS | HEART RATE: 62 BPM | SYSTOLIC BLOOD PRESSURE: 148 MMHG | BODY MASS INDEX: 15.54 KG/M2 | HEIGHT: 64 IN | DIASTOLIC BLOOD PRESSURE: 76 MMHG | RESPIRATION RATE: 16 BRPM | TEMPERATURE: 98.6 F

## 2018-11-26 DIAGNOSIS — C80.1 ADENOCARCINOMA (HCC): ICD-10-CM

## 2018-11-26 DIAGNOSIS — C79.9 METASTATIC CANCER (HCC): Primary | ICD-10-CM

## 2018-11-26 DIAGNOSIS — R59.0 LEFT CERVICAL LYMPHADENOPATHY: Primary | ICD-10-CM

## 2018-11-26 DIAGNOSIS — C34.90 MALIGNANT NEOPLASM OF LUNG, UNSPECIFIED LATERALITY, UNSPECIFIED PART OF LUNG (HCC): ICD-10-CM

## 2018-11-26 DIAGNOSIS — C34.90 SMALL CELL LUNG CANCER (HCC): ICD-10-CM

## 2018-11-26 LAB
ALBUMIN SERPL-MCNC: 4.3 G/DL (ref 3.5–5.2)
ALBUMIN/GLOB SERPL: 1.5 G/DL (ref 1.1–2.4)
ALP SERPL-CCNC: 66 U/L (ref 38–116)
ALT SERPL W P-5'-P-CCNC: 14 U/L (ref 0–33)
ANION GAP SERPL CALCULATED.3IONS-SCNC: 15 MMOL/L
AST SERPL-CCNC: 26 U/L (ref 0–32)
BASOPHILS # BLD AUTO: 0.02 10*3/MM3 (ref 0–0.1)
BASOPHILS NFR BLD AUTO: 0.4 % (ref 0–1.1)
BILIRUB SERPL-MCNC: 0.3 MG/DL (ref 0.1–1.2)
BUN BLD-MCNC: 12 MG/DL (ref 6–20)
BUN/CREAT SERPL: 26.1 (ref 7.3–30)
CALCIUM SPEC-SCNC: 9.8 MG/DL (ref 8.5–10.2)
CHLORIDE SERPL-SCNC: 97 MMOL/L (ref 98–107)
CO2 SERPL-SCNC: 26 MMOL/L (ref 22–29)
CREAT BLD-MCNC: 0.46 MG/DL (ref 0.6–1.1)
DEPRECATED RDW RBC AUTO: 43.4 FL (ref 37–49)
EOSINOPHIL # BLD AUTO: 0.04 10*3/MM3 (ref 0–0.36)
EOSINOPHIL NFR BLD AUTO: 0.8 % (ref 1–5)
ERYTHROCYTE [DISTWIDTH] IN BLOOD BY AUTOMATED COUNT: 11.9 % (ref 11.7–14.5)
GFR SERPL CREATININE-BSD FRML MDRD: 136 ML/MIN/1.73
GLOBULIN UR ELPH-MCNC: 2.8 GM/DL (ref 1.8–3.5)
GLUCOSE BLD-MCNC: 91 MG/DL (ref 74–124)
HCT VFR BLD AUTO: 40.7 % (ref 34–45)
HGB BLD-MCNC: 14.3 G/DL (ref 11.5–14.9)
IMM GRANULOCYTES # BLD: 0.02 10*3/MM3 (ref 0–0.03)
IMM GRANULOCYTES NFR BLD: 0.4 % (ref 0–0.5)
LYMPHOCYTES # BLD AUTO: 0.76 10*3/MM3 (ref 1–3.5)
LYMPHOCYTES NFR BLD AUTO: 14.8 % (ref 20–49)
MCH RBC QN AUTO: 35 PG (ref 27–33)
MCHC RBC AUTO-ENTMCNC: 35.1 G/DL (ref 32–35)
MCV RBC AUTO: 99.5 FL (ref 83–97)
MONOCYTES # BLD AUTO: 0.6 10*3/MM3 (ref 0.25–0.8)
MONOCYTES NFR BLD AUTO: 11.7 % (ref 4–12)
NEUTROPHILS # BLD AUTO: 3.71 10*3/MM3 (ref 1.5–7)
NEUTROPHILS NFR BLD AUTO: 71.9 % (ref 39–75)
NRBC BLD MANUAL-RTO: 0 /100 WBC (ref 0–0)
PLATELET # BLD AUTO: 215 10*3/MM3 (ref 150–375)
PMV BLD AUTO: 9 FL (ref 8.9–12.1)
POTASSIUM BLD-SCNC: 4 MMOL/L (ref 3.5–4.7)
PROT SERPL-MCNC: 7.1 G/DL (ref 6.3–8)
RBC # BLD AUTO: 4.09 10*6/MM3 (ref 3.9–5)
SODIUM BLD-SCNC: 138 MMOL/L (ref 134–145)
WBC NRBC COR # BLD: 5.15 10*3/MM3 (ref 4–10)

## 2018-11-26 PROCEDURE — 80053 COMPREHEN METABOLIC PANEL: CPT | Performed by: INTERNAL MEDICINE

## 2018-11-26 PROCEDURE — 85025 COMPLETE CBC W/AUTO DIFF WBC: CPT | Performed by: INTERNAL MEDICINE

## 2018-11-26 PROCEDURE — 99215 OFFICE O/P EST HI 40 MIN: CPT | Performed by: INTERNAL MEDICINE

## 2018-11-26 PROCEDURE — 36415 COLL VENOUS BLD VENIPUNCTURE: CPT | Performed by: INTERNAL MEDICINE

## 2018-11-26 NOTE — PROGRESS NOTES
Subjective     REASON FOR CONSULTATION:   1.  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    2.  Patient started chemotherapy with carboplatin/-16 as of May 22, 2018.    3.  Cycle 4 chemotherapy with carboplatinum -16 given on July 30, 2018 with CT scan showing stability of disease.  She has had significant response compared to prior imaging prior to start treatment.                       HISTORY OF PRESENT ILLNESS:  The patient is a 63 y.o. year old female who is here for an opinion about the above issue.    History of Present Illness    The patient has extensive small cell lung carcinoma and currently on carboplatin and V P 16.  Patient completed 4 cycles of carboplatinum -16 following which the CT  scan shows a significant decrease in size from 8.8×5.6 cm to 4.8×3.3 cm.  This is the pleural-based mass in the right upper lobe.  There is decreased  encroachment into the T4-T5 neural foramina.  There is decrease in the size of the left upper lobe lesion from 4 cm to 2.5 cm.  The 3 cm left adrenal mass is completely resolved.  The left neck node is completely resolved.    Following 4 cycles of therapy and referred the patient for a clinical trial at the [facility] [place] with JUANITO .  She did see Dr. Nieto at the [facility] [place] but patient refuses to go on the clinical trial.  Given that she had stability of disease discussion was done with Dr. Nieto  today.  Since she has stable  disease after 4 cycles of chemotherapy and her tumor had encroached upon the T4-T5 vertebrae prior to start of treatment she may benefit from radiation to the right upper lobe lung lesion and also questionable consideration for PCI.  Given that this was stage IV disease and patient has tumor both in the left upper lobe of the lung and right upper lobe of the  lung I'm unsure that she really is a candidate for PCI.     Patient has been seen by Dr. Blair and started on radiation to the lung.  Currently Dr. Blair was not planning to do PCI.      Interval history: Patient completed radiation to the chest.  She now noticed left neck lymph node which is at least 2 cm in size.  This started only within the last week.  She still has a good appetite.  Given new neck lymphadenopathy we will need to obtain staging workup     Past Medical History:   Diagnosis Date   • Anxiety    • Basal cell carcinoma     Follows up with Dermatology annually, [place] with Dr. Antoine's office   • Bowen's disease of vulva    • Depression    • History of kidney stone 2018   • Left adrenal mass (CMS/HCC)    • Lung cancer (CMS/HCC)     Right lung with metastasis to neck node.   • Lung mass     Bilateral   • Mitral valve prolapse    • Neuropathy    • Right renal mass    • Rosacea    • Seasonal allergies    • Shingles 2014   • Small cell carcinoma (CMS/HCC) 05/14/2018    Small cell carcinoma of the lung with metastasis to the left neck node and the left adrenal gland   • Uterine mass      Past Surgical History:   Procedure Laterality Date   • BASAL CELL CARCINOMA EXCISION     • BREAST AUGMENTATION Bilateral 1980   • BREAST SURGERY      Implants   • CATARACT EXTRACTION WITH INTRAOCULAR LENS IMPLANT     • EYE SURGERY      macular hole surgically closed/cataract removal and implant   • RETINAL LASER PROCEDURE     • US GUIDED LYMPH NODE BIOPSY  5/14/2018    Ultrasound-guided biopsy of left neck mass-Dr. Roberto Calle, [facility]   • VITRECTOMY PARS PLANA      WITH REPAIR OF MACULAR HOLE   • VULVECTOMY N/A     partial, due to Bowen's Disease       Oncologic history  patient is a 62-year-old female who had gone to the emergency room at [facility] on March 30, 2018 with intermittent severe sharp right flank pain and right back pain which started a week prior.  She also states that it worsens in the night and it lasted 20-30  minutes and was intermittent.  She had pain after eating.  In the emergency room they did a CT of the abdomen pelvis which showed a 21 mm left adrenal mass.  A 1.5 cm nonobstructing right kidney stone.  Ultrasound of the gallbladder was done which showed no evidence of acute cholecystitis.    She also had a recent mammogram April 19, 2018 which was negative.  She came in with continued pain and went to the primary care physician.  She had a repeat abdominal ultrasound on April 23, 2018.  This showed that the liver and pancreas appeared normal the gallbladder appeared normal the spleen was normal.  She had calcifications in the [place].  There is a 15 mm focus hypoechoic lesion in the right mid pole of the kidney.  Adjacent to the upper pole of the left kidney there is a solid appearing mass which is 28 mm.  This corresponds to the adrenal lesion which was seen on the CT scan previously.    Patient  had a CT scan of the chest with contrast on May 2, 2018.  There is a large heterogeneous mass within the posterior  segment of the right upper lobe which measures approximately 8.8 x 5.6  cm and the craniocaudad span is approximately 6.6 cm. There is extension  into the posterior chest wall at the posterior right 4th-5th intercostal  space and there is extension into the right T4-5 neural foramen. There  are multiple nodules surrounding the lesion in the right upper lobe.  There is also an irregular 4 x 3 cm mass at the anterior aspect of the  left upper lobe. In addition, there is an 8 mm irregular opacity in the  left apical region and a 6 mm pulmonary nodule inferiorly in the left  upper lobe. There are 2 irregular reticular opacities in the right lower  lobe which both measure approximately 1 cm. There are no pleural or  pericardial effusions. There is ill-defined lymphadenopathy at the right  hilum. There are moderately extensive underlying emphysematous changes.  In the visualized upper abdomen, there is a 3.0 x 2.2 cm  left adrenal  nodule. There is a faintly hyperenhancing 7 mm lesion within the  anterior hepatic segment.    Patient continues to have right upper back pain.  She denies any bladder or bowel incontinence.  She denies any headache.  She does not have a tissue diagnosis.  She has also seen that she developed a left neck node which has increased within the last month.  She was referred to ENT Dr. Forbes.  The patient had an endoscopy which apparently was negative.  The neck node is reasonably large about 3 into 4 cm.  She will require core needle biopsy of the neck node which is easy access.  She did lose a lot of weight.  MRI brain is negative and thoracic spine MRI does not show evidence of any cord compression except involvement and impingement at the T4 and approved.    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  This is thought to be a small cell metastatic lung cancer.  Patient is here to discuss options of treatment.    I had a lengthy discussion about consideration of chemotherapy with carboplatin/-16.    Carboplatin and -16 started 5/22 2008.  Discussed with Dr. Robin Terry at [facility], following upfront chemotherapy he does not have any maintenance clinical trials.  I believe [facility] may have maintenance clinical trial.  If very good response could consider referral to Dr. Nieto.    Cycle 3 of carboplatinum -16 given July 9, 2018  Cycle 4 carboplatin -16 on July 30, 2018    He shouldn't was referred to Dr. Nieto at the [facility] for clinical trial for extensive small cell lung cancer with ROWA -T  , patient does not want to go on that clinical trial.     Since patient completed cycle 4 of carbo -16, and had stable disease patient now is receiving radiation to the large right upper lobe lung mass with plans on doing total of 25 radiation treatments.    Status post XRT to the chest    11/28/2018:  Let  neck lymphadenopathy    Current Outpatient Medications on File Prior to Visit   Medication Sig Dispense Refill   • AFLURIA QUADRIVALENT 0.5 ML suspension prefilled syringe injection ADM 0.5ML IM UTD  0   • doxycycline (VIBRAMYCIN) 100 MG capsule TK 1 C PO BID PRN  2   • Loratadine (CLARITIN CHILDRENS PO) Take 5 mL by mouth Daily.     • prochlorperazine (COMPAZINE) 10 MG tablet Take 1 tablet by mouth Every 8 (Eight) Hours As Needed for Nausea or Vomiting. 30 tablet 1   • lidocaine-prilocaine (EMLA) 2.5-2.5 % cream Apply quarter size amount to port site 30-45 minutes prior to port access 1 each 0     No current facility-administered medications on file prior to visit.         ALLERGIES:  No Known Allergies     Social History     Socioeconomic History   • Marital status:      Spouse name: Not on file   • Number of children: Not on file   • Years of education: Not on file   • Highest education level: Not on file   Occupational History   • Occupation:      Comment: self-employed   Tobacco Use   • Smoking status: Current Every Day Smoker     Packs/day: 1.50     Years: 48.00     Pack years: 72.00     Types: Cigarettes     Start date: 5/30/1971   • Smokeless tobacco: Never Used   • Tobacco comment: started smoking at age 16   Substance and Sexual Activity   • Alcohol use: Yes     Types: 4 - 6 Glasses of wine per week     Comment: 2 glasses of wine a few nights per week   • Drug use: No   • Sexual activity: No   Social History Narrative    Works as a .  Lives at home with her daughter.          Family History   Problem Relation Age of Onset   • Other Mother         Cerebral hemorrhage   • Heart disease Father    • Thyroid cancer Sister    • No Known Problems Brother    • Malig Hyperthermia Neg Hx       I have reviewed the patient's medical history in detail and updated the computerized patient record.    Review of Systems   Constitutional: Positive for fatigue and unexpected weight change.  "Negative for chills and fever.   HENT: Negative for mouth sores and sore throat.    Eyes: Negative for visual disturbance.   Respiratory: Negative for cough, chest tightness and shortness of breath.    Cardiovascular: Negative for chest pain, palpitations and leg swelling.   Gastrointestinal: Negative for abdominal pain, blood in stool, diarrhea, nausea and vomiting.   Genitourinary: Negative for dysuria and frequency.   Musculoskeletal: Negative for arthralgias, joint swelling, myalgias and neck stiffness.   Neurological: Negative for dizziness, weakness and headaches.   Hematological: Does not bruise/bleed easily.   Psychiatric/Behavioral: The patient is not nervous/anxious.    All other systems reviewed and are negative.  No change in her review of systems from before.  I have reviewed all the symptoms with her today October 1, 2018  Objective     Vitals:    11/26/18 1426   BP: 148/76   Pulse: 62   Resp: 16   Temp: 98.6 °F (37 °C)   TempSrc: Oral   SpO2: 92%   Weight: 41.3 kg (91 lb)   Height: 163.5 cm (64.37\")   PainSc: 0-No pain     Current Status 11/26/2018   ECOG score 0       Physical Exam        GENERAL:  Well-developed, well-nourished in no acute distress.   SKIN:  Warm, dry without rashes, purpura or petechiae.  EYES:  Pupils equal, round and reactive to light.  EOMs intact.  Conjunctivae normal.  EARS:  Hearing intact.  NOSE:  Septum midline.  No excoriations or nasal discharge.  MOUTH:  Tongue is well-papillated; no stomatitis or ulcers.  Lips normal.  THROAT:  Oropharynx without lesions or exudates.  NECK:  Supple with good range of motion; no thyromegaly or masses, no JVD.  Patient has 2 cm left neck lymphadenopathy which is high  LYMPHATICS:  No cervical, supraclavicular, axillary or inguinal adenopathy.  CHEST:  Lungs clear to auscultation. Good airflow.  CARDIAC:  Regular rate and rhythm without murmurs, rubs or gallops. Normal S1,S2.  ABDOMEN:  Soft, nontender with no hepatosplenomegaly or " masses.  EXTREMITIES:  No clubbing, cyanosis or edema.  NEUROLOGICAL:  Cranial Nerves II-XII grossly intact.  No focal neurological deficits.  PSYCHIATRIC:  Normal affect and mood.            RECENT LABS:  Results from last 7 days   Lab Units  11/26/18   1345   WBC 10*3/mm3  5.15   NEUTROS ABS 10*3/mm3  3.71   HEMOGLOBIN g/dL  14.3   HEMATOCRIT %  40.7   PLATELETS 10*3/mm3  215     Results from last 7 days   Lab Units  11/28/18   1127  11/26/18   1345   SODIUM mmol/L   --   138   POTASSIUM mmol/L   --   4.0   CHLORIDE mmol/L   --   97*   CO2 mmol/L   --   26.0   BUN mg/dL   --   12   CREATININE mg/dL  0.50*  0.46*   CALCIUM mg/dL   --   9.8   ALBUMIN g/dL   --   4.30   BILIRUBIN mg/dL   --   0.3   ALK PHOS U/L   --   66   ALT (SGPT) U/L   --   14   AST (SGOT) U/L   --   26   GLUCOSE mg/dL   --   91           Assessment/Plan     1. Extensive stage small cell lung cancer.  Newly diagnosed bilateral lung nodules, with a large 8 cm ×6 cm right upper lobe lung nodule with extension into the intercostal space between the fourth and fifth rib and also extending into the thoracic 4-5 neural foramina.  Patient has bilateral pulmonary nodular opacities which are suspicious for metastasis.  Patient also has a 3 cm left adrenal nodule suspicious for metastasis.  Further evaluation with a PET CT is recommended.  Patient has noticed a left neck nodule and is a highly high nodule which is growing very quickly in the last month.  Patient has seen ENT and endoscopy was negative.  She has distant metastases to the adrenal gland.  Pathology consistent with small cell consistent with a lung primary.  MRI brain negative and MRI of the spine shows no impingement without any cord compression.     Chemotherapy initiated 5/22/2018 with carboplatin -16.  Plans to administer 4-6 cycles of chemotherapy.  We will repeat CT scans following cycle 3.  · Cycle 2 chemotherapy with carboplatinum -16.  Will increase dose today to 500 mg  carboplatinum IV.  She'll continue with Neulasta support.  She has already shown clinical response.  · Cycle 3 carboplatin -16 given July 9, 2018 with good tolerance  · CT scan after completion of 3 cycles shows significant response  · Her carboplatin dose is being adjusted to her body size as she has very low creatinine level and the dose comes up to 647 mg but however pharmacy discussion was done and felt that we would need to treat her with lower doses given her frail status and adjust to her body weight  · Patient completed 4 cycles of chemotherapy with stability of disease.  · Agent was referred to  or clinical trial with JUANITO but patient refused.  · We will refer patient to radiation oncology to see if palliative radiation can be done to the right upper lobe lung lesion and questionable prophylactic cranial radiation, though I do not believe she is a candidate for that given she has bilateral lung nodules still present.  · If she fails in future she could be a candidate for topotecan or irinotecan.  immunotherapy could be in this third line setting.  · Patient currently receiving radiation to the lung, right upper lobe.  · Patient now has new neck lymphadenopathy.  We will consider CPT 11 chemotherapy as single agent      2.  Neutropenia prophylaxis.  The patient will continue to receive Neulasta injection on day 4, rather than on body injector due to frail body habitus.    3.  Anxiety and depression. The patient was previously referred to behavioral oncology.    4.  Venous access.  Port placed by Dr. Granger 5/25/2018.  Patient continues to slowly recover from port placement using tylenol as needed.  Patient has pain at the port site but there is no erythema.  There is no swelling in the right upper extremity.  The pain is likely because the skin is stretched as she is very thin.    Plan:    1 obtain CT scan of the neck chest abdomen pelvis with contrast    2.  MRI of the brain    3.  Bone  scan    4.  Start chemotherapy with CPT-11 single agent.  Discussion done with Dr. Robin Terry and opdivo or Keytruda is approved in the third line setting.  However if insurance allows in the second line setting it could be tried.    Leyla Canada MD    Cc: Dr. Kumar Blair

## 2018-11-26 NOTE — PROGRESS NOTES
Orders placed for CT scan of neck, chest, abdomen and pelvis with contrast,  MRI of brain with and without contrast, bone scan, all to be done this week per Dr. Canada

## 2018-11-27 ENCOUNTER — HOSPITAL ENCOUNTER (OUTPATIENT)
Dept: MRI IMAGING | Facility: HOSPITAL | Age: 63
Discharge: HOME | End: 2018-11-27
Attending: INTERNAL MEDICINE | Admitting: INTERNAL MEDICINE

## 2018-11-27 DIAGNOSIS — C80.1 ADENOCARCINOMA (HCC): ICD-10-CM

## 2018-11-27 DIAGNOSIS — C34.90 SMALL CELL LUNG CANCER (HCC): ICD-10-CM

## 2018-11-27 DIAGNOSIS — C79.9 METASTATIC CANCER (HCC): ICD-10-CM

## 2018-11-27 PROCEDURE — A9577 INJ MULTIHANCE: HCPCS | Performed by: INTERNAL MEDICINE

## 2018-11-27 PROCEDURE — 70553 MRI BRAIN STEM W/O & W/DYE: CPT

## 2018-11-27 PROCEDURE — 0 GADOBENATE DIMEGLUMINE 529 MG/ML SOLUTION: Performed by: INTERNAL MEDICINE

## 2018-11-27 RX ADMIN — GADOBENATE DIMEGLUMINE 8 ML: 529 INJECTION, SOLUTION INTRAVENOUS at 10:31

## 2018-11-28 ENCOUNTER — HOSPITAL ENCOUNTER (OUTPATIENT)
Dept: CT IMAGING | Facility: HOSPITAL | Age: 63
Discharge: HOME | End: 2018-11-28
Attending: INTERNAL MEDICINE | Admitting: INTERNAL MEDICINE

## 2018-11-28 ENCOUNTER — HOSPITAL ENCOUNTER (OUTPATIENT)
Dept: NUCLEAR MEDICINE | Facility: HOSPITAL | Age: 63
Discharge: HOME | End: 2018-11-28
Attending: INTERNAL MEDICINE

## 2018-11-28 DIAGNOSIS — C34.90 SMALL CELL LUNG CANCER (HCC): ICD-10-CM

## 2018-11-28 DIAGNOSIS — C79.9 METASTATIC CANCER (HCC): ICD-10-CM

## 2018-11-28 DIAGNOSIS — C80.1 ADENOCARCINOMA (HCC): ICD-10-CM

## 2018-11-28 LAB — CREAT BLDA-MCNC: 0.5 MG/DL (ref 0.6–1.3)

## 2018-11-28 PROCEDURE — 78306 BONE IMAGING WHOLE BODY: CPT

## 2018-11-28 PROCEDURE — 0 TECHNETIUM MEDRONATE KIT: Performed by: INTERNAL MEDICINE

## 2018-11-28 PROCEDURE — A9503 TC99M MEDRONATE: HCPCS | Performed by: INTERNAL MEDICINE

## 2018-11-28 PROCEDURE — 82565 ASSAY OF CREATININE: CPT

## 2018-11-28 PROCEDURE — 70491 CT SOFT TISSUE NECK W/DYE: CPT

## 2018-11-28 PROCEDURE — 25010000002 IOPAMIDOL 61 % SOLUTION: Performed by: INTERNAL MEDICINE

## 2018-11-28 PROCEDURE — 71260 CT THORAX DX C+: CPT

## 2018-11-28 PROCEDURE — 74177 CT ABD & PELVIS W/CONTRAST: CPT

## 2018-11-28 RX ORDER — TC 99M MEDRONATE 20 MG/10ML
19.4 INJECTION, POWDER, LYOPHILIZED, FOR SOLUTION INTRAVENOUS
Status: COMPLETED | OUTPATIENT
Start: 2018-11-28 | End: 2018-11-28

## 2018-11-28 RX ADMIN — Medication 19.4 MILLICURIE: at 11:20

## 2018-11-28 RX ADMIN — IOPAMIDOL 85 ML: 612 INJECTION, SOLUTION INTRAVENOUS at 12:36

## 2018-12-03 ENCOUNTER — OFFICE VISIT (OUTPATIENT)
Dept: ONCOLOGY | Facility: CLINIC | Age: 63
End: 2018-12-03

## 2018-12-03 ENCOUNTER — INFUSION (OUTPATIENT)
Dept: ONCOLOGY | Facility: HOSPITAL | Age: 63
End: 2018-12-03

## 2018-12-03 VITALS
BODY MASS INDEX: 15.48 KG/M2 | HEART RATE: 63 BPM | TEMPERATURE: 99.1 F | HEIGHT: 64 IN | SYSTOLIC BLOOD PRESSURE: 124 MMHG | WEIGHT: 90.7 LBS | RESPIRATION RATE: 16 BRPM | DIASTOLIC BLOOD PRESSURE: 70 MMHG | OXYGEN SATURATION: 95 %

## 2018-12-03 DIAGNOSIS — E27.8 LEFT ADRENAL MASS (HCC): Primary | ICD-10-CM

## 2018-12-03 DIAGNOSIS — R59.0 LEFT CERVICAL LYMPHADENOPATHY: Primary | ICD-10-CM

## 2018-12-03 DIAGNOSIS — C34.90 SMALL CELL LUNG CANCER (HCC): ICD-10-CM

## 2018-12-03 DIAGNOSIS — C79.9 METASTATIC CANCER (HCC): ICD-10-CM

## 2018-12-03 DIAGNOSIS — Z45.2 FITTING AND ADJUSTMENT OF VASCULAR CATHETER: ICD-10-CM

## 2018-12-03 LAB
BASOPHILS # BLD AUTO: 0.03 10*3/MM3 (ref 0–0.1)
BASOPHILS NFR BLD AUTO: 0.6 % (ref 0–1.1)
DEPRECATED RDW RBC AUTO: 44.8 FL (ref 37–49)
EOSINOPHIL # BLD AUTO: 0.03 10*3/MM3 (ref 0–0.36)
EOSINOPHIL NFR BLD AUTO: 0.6 % (ref 1–5)
ERYTHROCYTE [DISTWIDTH] IN BLOOD BY AUTOMATED COUNT: 12.2 % (ref 11.7–14.5)
HCT VFR BLD AUTO: 39.1 % (ref 34–45)
HGB BLD-MCNC: 13.6 G/DL (ref 11.5–14.9)
IMM GRANULOCYTES # BLD: 0.03 10*3/MM3 (ref 0–0.03)
IMM GRANULOCYTES NFR BLD: 0.6 % (ref 0–0.5)
LYMPHOCYTES # BLD AUTO: 0.68 10*3/MM3 (ref 1–3.5)
LYMPHOCYTES NFR BLD AUTO: 12.5 % (ref 20–49)
MCH RBC QN AUTO: 34.9 PG (ref 27–33)
MCHC RBC AUTO-ENTMCNC: 34.8 G/DL (ref 32–35)
MCV RBC AUTO: 100.3 FL (ref 83–97)
MONOCYTES # BLD AUTO: 0.5 10*3/MM3 (ref 0.25–0.8)
MONOCYTES NFR BLD AUTO: 9.2 % (ref 4–12)
NEUTROPHILS # BLD AUTO: 4.16 10*3/MM3 (ref 1.5–7)
NEUTROPHILS NFR BLD AUTO: 76.5 % (ref 39–75)
NRBC BLD MANUAL-RTO: 0 /100 WBC (ref 0–0)
PLATELET # BLD AUTO: 149 10*3/MM3 (ref 150–375)
PMV BLD AUTO: 10.3 FL (ref 8.9–12.1)
RBC # BLD AUTO: 3.9 10*6/MM3 (ref 3.9–5)
WBC NRBC COR # BLD: 5.43 10*3/MM3 (ref 4–10)

## 2018-12-03 PROCEDURE — 99215 OFFICE O/P EST HI 40 MIN: CPT | Performed by: INTERNAL MEDICINE

## 2018-12-03 PROCEDURE — 85025 COMPLETE CBC W/AUTO DIFF WBC: CPT

## 2018-12-03 RX ORDER — SODIUM CHLORIDE 0.9 % (FLUSH) 0.9 %
10 SYRINGE (ML) INJECTION AS NEEDED
Status: DISCONTINUED | OUTPATIENT
Start: 2018-12-03 | End: 2018-12-03 | Stop reason: HOSPADM

## 2018-12-03 RX ORDER — SODIUM CHLORIDE 0.9 % (FLUSH) 0.9 %
10 SYRINGE (ML) INJECTION AS NEEDED
Status: CANCELLED | OUTPATIENT
Start: 2018-12-03

## 2018-12-03 RX ADMIN — SODIUM CHLORIDE, PRESERVATIVE FREE 10 ML: 5 INJECTION INTRAVENOUS at 11:03

## 2018-12-03 RX ADMIN — SODIUM CHLORIDE, PRESERVATIVE FREE 500 UNITS: 5 INJECTION INTRAVENOUS at 11:03

## 2018-12-03 NOTE — PROGRESS NOTES
Subjective     REASON FOR CONSULTATION:   1.  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    2.  Patient started chemotherapy with carboplatin/-16 as of May 22, 2018.    3.  Cycle 4 chemotherapy with carboplatinum -16 given on July 30, 2018 with CT scan showing stability of disease.  She has had significant response compared to prior imaging prior to start treatment.    4.  Patient with progressive disease in the neck neck, adrenal gland and the right hilar mass consistent with progressive small cell lung cancer.  Offered opdivo.                         HISTORY OF PRESENT ILLNESS:  The patient is a 63 y.o. year old female who is here for an opinion about the above issue.    History of Present Illness    The patient has extensive small cell lung carcinoma and currently on carboplatin and V P 16.  Patient completed 4 cycles of carboplatinum -16 following which the CT  scan shows a significant decrease in size from 8.8×5.6 cm to 4.8×3.3 cm.  This is the pleural-based mass in the right upper lobe.  There is decreased  encroachment into the T4-T5 neural foramina.  There is decrease in the size of the left upper lobe lesion from 4 cm to 2.5 cm.  The 3 cm left adrenal mass is completely resolved.  The left neck node is completely resolved.    Following 4 cycles of therapy and referred the patient for a clinical trial at the [facility] with JUANITO .  She did see Dr. Nieto at the [facility] but patient refuses to go on the clinical trial.  Given that she had stability of disease discussion was done with Dr. Nieto  today.  Since she has stable  disease after 4 cycles of chemotherapy and her tumor had encroached upon the T4-T5 vertebrae prior to start of treatment she may benefit from radiation to the right upper lobe lung lesion and  also questionable consideration for PCI.  Given that this was stage IV disease and patient has tumor both in the left upper lobe of the lung and right upper lobe of the lung I'm unsure that she really is a candidate for PCI.     Patient has been seen by Dr. Blair and started on radiation to the lung.  Currently Dr. Blair was not planning to do PCI.      Interval history: The patient underwent CT scan of the chest abdomen pelvis and neck and has a large left neck lymph node with posterior triangle lymph nodes a right hilar mass and left adrenal gland which is enlarged.  This being progressive disease we discussed in length about considering single agent CPT 11 3 weeks on 1 week off as opposed to opdivo.  I discussed in length the side effects of both and discussed in length the immunotherapy side effects.  Her insurance will cover immunotherapy and we plan to start next week.  But patient is a little unwilling to consider immunotherapy.  She wants to think about it.  If she does not get treatment her prognosis is very poor less than 3 months.  But if she does get treated and response to 8 there is a 10% chance of responding significantly.  Immunotherapy.  Discussion done with Dr. Robin Terry at [facility].    Past Medical History:   Diagnosis Date   • Anxiety    • Basal cell carcinoma     Follows up with Dermatology annually, [place] with Dr. Antoine's office   • Bowen's disease of vulva    • Depression    • History of kidney stone 2018   • Left adrenal mass (CMS/HCC)    • Lung cancer (CMS/HCC)     Right lung with metastasis to neck node.   • Lung mass     Bilateral   • Mitral valve prolapse    • Neuropathy    • Right renal mass    • Rosacea    • Seasonal allergies    • Shingles 2014   • Small cell carcinoma (CMS/HCC) 05/14/2018    Small cell carcinoma of the lung with metastasis to the left neck node and the left adrenal gland   • Uterine mass      Past Surgical History:   Procedure Laterality Date   • BASAL CELL  CARCINOMA EXCISION     • BREAST AUGMENTATION Bilateral 1980   • BREAST SURGERY      Implants   • CATARACT EXTRACTION WITH INTRAOCULAR LENS IMPLANT     • EYE SURGERY      macular hole surgically closed/cataract removal and implant   • RETINAL LASER PROCEDURE     • US GUIDED LYMPH NODE BIOPSY  5/14/2018    Ultrasound-guided biopsy of left neck mass-Dr. Roberto Calle, [facility]   • VITRECTOMY PARS PLANA      WITH REPAIR OF MACULAR HOLE   • VULVECTOMY N/A     partial, due to Bowen's Disease       Oncologic history  patient is a 62-year-old female who had gone to the emergency room at [facility] on March 30, 2018 with intermittent severe sharp right flank pain and right back pain which started a week prior.  She also states that it worsens in the night and it lasted 20-30 minutes and was intermittent.  She had pain after eating.  In the emergency room they did a CT of the abdomen pelvis which showed a 21 mm left adrenal mass.  A 1.5 cm nonobstructing right kidney stone.  Ultrasound of the gallbladder was done which showed no evidence of acute cholecystitis.    She also had a recent mammogram April 19, 2018 which was negative.  She came in with continued pain and went to the primary care physician.  She had a repeat abdominal ultrasound on April 23, 2018.  This showed that the liver and pancreas appeared normal the gallbladder appeared normal the spleen was normal.  She had calcifications in the [place].  There is a 15 mm focus hypoechoic lesion in the right mid pole of the kidney.  Adjacent to the upper pole of the left kidney there is a solid appearing mass which is 28 mm.  This corresponds to the adrenal lesion which was seen on the CT scan previously.    Patient  had a CT scan of the chest with contrast on May 2, 2018.  There is a large heterogeneous mass within the posterior  segment of the right upper lobe which measures approximately 8.8 x 5.6  cm and the craniocaudad span is approximately 6.6 cm. There is extension  into  the posterior chest wall at the posterior right 4th-5th intercostal  space and there is extension into the right T4-5 neural foramen. There  are multiple nodules surrounding the lesion in the right upper lobe.  There is also an irregular 4 x 3 cm mass at the anterior aspect of the  left upper lobe. In addition, there is an 8 mm irregular opacity in the  left apical region and a 6 mm pulmonary nodule inferiorly in the left  upper lobe. There are 2 irregular reticular opacities in the right lower  lobe which both measure approximately 1 cm. There are no pleural or  pericardial effusions. There is ill-defined lymphadenopathy at the right  hilum. There are moderately extensive underlying emphysematous changes.  In the visualized upper abdomen, there is a 3.0 x 2.2 cm left adrenal  nodule. There is a faintly hyperenhancing 7 mm lesion within the  anterior hepatic segment.    Patient continues to have right upper back pain.  She denies any bladder or bowel incontinence.  She denies any headache.  She does not have a tissue diagnosis.  She has also seen that she developed a left neck node which has increased within the last month.  She was referred to ENT Dr. Forbes.  The patient had an endoscopy which apparently was negative.  The neck node is reasonably large about 3 into 4 cm.  She will require core needle biopsy of the neck node which is easy access.  She did lose a lot of weight.  MRI brain is negative and thoracic spine MRI does not show evidence of any cord compression except involvement and impingement at the T4 and approved.    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  This is thought to be a small cell metastatic lung cancer.  Patient is here to discuss options of treatment.    I had a lengthy discussion about consideration of chemotherapy with carboplatin/-16.    Carboplatin and -16 started 5/22 2008.  Discussed with Dr. Robin Terry at  [facility], following upfront chemotherapy he does not have any maintenance clinical trials.  I believe [facility] may have maintenance clinical trial.  If very good response could consider referral to Dr. Nieto.    Cycle 3 of carboplatinum -16 given July 9, 2018  Cycle 4 carboplatin -16 on July 30, 2018    He shouldn't was referred to Dr. Nieto at the [facility] for clinical trial for extensive small cell lung cancer with ROWA -T  , patient does not want to go on that clinical trial.     Since patient completed cycle 4 of carbo -16, and had stable disease patient now is receiving radiation to the large right upper lobe lung mass with plans on doing total of 25 radiation treatments.    Status post XRT to the chest    11/28/2018:  Let neck lymphadenopathy.  Reviewed CT chest abdomen pelvis, bone scan and patient has extensive disease.  Will try immunotherapy if patient is agreeable    Current Outpatient Medications on File Prior to Visit   Medication Sig Dispense Refill   • AFLURIA QUADRIVALENT 0.5 ML suspension prefilled syringe injection ADM 0.5ML IM UTD  0   • doxycycline (VIBRAMYCIN) 100 MG capsule TK 1 C PO BID PRN  2   • Loratadine (CLARITIN CHILDRENS PO) Take 5 mL by mouth Daily.     • prochlorperazine (COMPAZINE) 10 MG tablet Take 1 tablet by mouth Every 8 (Eight) Hours As Needed for Nausea or Vomiting. 30 tablet 1     No current facility-administered medications on file prior to visit.         ALLERGIES:  No Known Allergies     Social History     Socioeconomic History   • Marital status:      Spouse name: Not on file   • Number of children: Not on file   • Years of education: Not on file   • Highest education level: Not on file   Occupational History   • Occupation:      Comment: self-employed   Tobacco Use   • Smoking status: Current Every Day Smoker     Packs/day: 1.50     Years: 48.00     Pack years: 72.00     Types: Cigarettes     Start date:  "5/30/1971   • Smokeless tobacco: Never Used   • Tobacco comment: started smoking at age 16   Substance and Sexual Activity   • Alcohol use: Yes     Types: 4 - 6 Glasses of wine per week     Comment: 2 glasses of wine a few nights per week   • Drug use: No   • Sexual activity: No   Social History Narrative    Works as a .  Lives at home with her daughter.          Family History   Problem Relation Age of Onset   • Other Mother         Cerebral hemorrhage   • Heart disease Father    • Thyroid cancer Sister    • No Known Problems Brother    • Malig Hyperthermia Neg Hx       I have reviewed the patient's medical history in detail and updated the computerized patient record.    Review of Systems   Constitutional: Positive for fatigue and unexpected weight change. Negative for chills and fever.   HENT: Negative for mouth sores and sore throat.    Eyes: Negative for visual disturbance.   Respiratory: Negative for cough, chest tightness and shortness of breath.    Cardiovascular: Negative for chest pain, palpitations and leg swelling.   Gastrointestinal: Negative for abdominal pain, blood in stool, diarrhea, nausea and vomiting.   Genitourinary: Negative for dysuria and frequency.   Musculoskeletal: Negative for arthralgias, joint swelling, myalgias and neck stiffness.   Neurological: Negative for dizziness, weakness and headaches.   Hematological: Does not bruise/bleed easily.   Psychiatric/Behavioral: The patient is not nervous/anxious.    All other systems reviewed and are negative.  No change in her review of systems from before.  I have reviewed all the symptoms with her today December 3 2018    Objective     Vitals:    12/03/18 1108   BP: 124/70   Pulse: 63   Resp: 16   Temp: 99.1 °F (37.3 °C)   TempSrc: Oral   SpO2: 95%   Weight: 41.1 kg (90 lb 11.2 oz)   Height: 163.5 cm (64.37\")   PainSc: 0-No pain     Current Status 12/3/2018   ECOG score 0       Physical Exam        GENERAL:  Well-developed, " well-nourished in no acute distress.   SKIN:  Warm, dry without rashes, purpura or petechiae.  EYES:  Pupils equal, round and reactive to light.  EOMs intact.  Conjunctivae normal.  EARS:  Hearing intact.  NOSE:  Septum midline.  No excoriations or nasal discharge.  MOUTH:  Tongue is well-papillated; no stomatitis or ulcers.  Lips normal.  THROAT:  Oropharynx without lesions or exudates.  NECK:  Supple with good range of motion; no thyromegaly or masses, no JVD.  Patient has 2 cm left neck lymphadenopathy which is high  LYMPHATICS:  No cervical, supraclavicular, axillary or inguinal adenopathy.  CHEST:  Lungs clear to auscultation. Good airflow.  CARDIAC:  Regular rate and rhythm without murmurs, rubs or gallops. Normal S1,S2.  ABDOMEN:  Soft, nontender with no hepatosplenomegaly or masses.  EXTREMITIES:  No clubbing, cyanosis or edema.  NEUROLOGICAL:  Cranial Nerves II-XII grossly intact.  No focal neurological deficits.  PSYCHIATRIC:  Normal affect and mood.            RECENT LABS:  Results from last 7 days   Lab Units  12/03/18   1051   WBC 10*3/mm3  5.43   NEUTROS ABS 10*3/mm3  4.16   HEMOGLOBIN g/dL  13.6   HEMATOCRIT %  39.1   PLATELETS 10*3/mm3  149*     Results from last 7 days   Lab Units  11/28/18   1127   CREATININE mg/dL  0.50*         WHOLE BODY BONE SCAN  IMPRESSION:  Negative bone scan.    CT NECK, CHEST, ABDOMEN, AND PELVIS   IMPRESSION:  1. There has been interval development of left cervical lymphadenopathy,  right hilar lymphadenopathy, and there is a new 3 cm left adrenal  metastasis.  2. The lung lesions are all stable. There is no significant change in  the approximately 4.7 cm right upper lobe lung mass approximating the  right T4-T5 neural foramen.    MRI brain negative  Assessment/Plan     1. Extensive stage small cell lung cancer.  Newly diagnosed bilateral lung nodules, with a large 8 cm ×6 cm right upper lobe lung nodule with extension into the intercostal space between the fourth and  fifth rib and also extending into the thoracic 4-5 neural foramina.  Patient has bilateral pulmonary nodular opacities which are suspicious for metastasis.  Patient also has a 3 cm left adrenal nodule suspicious for metastasis.  Further evaluation with a PET CT is recommended.  Patient has noticed a left neck nodule and is a highly high nodule which is growing very quickly in the last month.  Patient has seen ENT and endoscopy was negative.  She has distant metastases to the adrenal gland.  Pathology consistent with small cell consistent with a lung primary.  MRI brain negative and MRI of the spine shows no impingement without any cord compression.     Chemotherapy initiated 5/22/2018 with carboplatin -16.  Plans to administer 4-6 cycles of chemotherapy.  We will repeat CT scans following cycle 3.  · Cycle 2 chemotherapy with carboplatinum -16.  Will increase dose today to 500 mg carboplatinum IV.  She'll continue with Neulasta support.  She has already shown clinical response.  · Cycle 3 carboplatin -16 given July 9, 2018 with good tolerance  · CT scan after completion of 3 cycles shows significant response  · Her carboplatin dose is being adjusted to her body size as she has very low creatinine level and the dose comes up to 647 mg but however pharmacy discussion was done and felt that we would need to treat her with lower doses given her frail status and adjust to her body weight  · Patient completed 4 cycles of chemotherapy with stability of disease.  · Agent was referred to  or clinical trial with WIL-T but patient refused.  · We will refer patient to radiation oncology to see if palliative radiation can be done to the right upper lobe lung lesion and questionable prophylactic cranial radiation, though I do not believe she is a candidate for that given she has bilateral lung nodules still present.  · If she fails in future she could be a candidate for topotecan or irinotecan.  immunotherapy  could be in this third line setting.  · Patient currently receiving radiation to the lung, right upper lobe.  · Patient now has new neck lymphadenopathy.  We will consider CPT 11 chemotherapy as single agent  · Given progressive disease as seen on CT scan of the chest abdomen pelvis will treat patient with immunotherapy.  Agent understands the side effects but wants to think about it.  Reviewed MRI of the brain is negative.      2.  Neutropenia prophylaxis.  The patient will continue to receive Neulasta injection on day 4, rather than on body injector due to frail body habitus.    3.  Anxiety and depression. The patient was previously referred to behavioral oncology.    4.  Venous access.  Port placed by Dr. Granger 5/25/2018.  Patient continues to slowly recover from port placement using tylenol as needed.  Patient has pain at the port site but there is no erythema.  There is no swelling in the right upper extremity.  The pain is likely because the skin is stretched as she is very thin.    Plan:    1. Discussed chemotherapy with CPT-11 versus immunotherapy.  Her insurance has agreed for immunotherapy.  2. Follow-up in one week for start of opdivo to 2 weeks.  Patient understands the side effects but wants to think about it.    3. Follow-up December 28, and M.D. with plans to do immunotherapy with opdivo   4. Follow-up with me in 2 weeks from December 28 with labs as well as immunotherapy.   5. If patient refuses treatment then we will need to consider hospice referral     Leyla Canada MD    Cc: Dr. Kumar Blair

## 2018-12-05 ENCOUNTER — TELEPHONE (OUTPATIENT)
Dept: ONCOLOGY | Facility: HOSPITAL | Age: 63
End: 2018-12-05

## 2018-12-05 NOTE — TELEPHONE ENCOUNTER
Spoke with pt who is very anxious about her upcoming tx. She said she has a rash on her back that is itching and she hopes it wont delay her tx. She will be starting Opdivo. She is using Hydrocortisone cream for the rash and advised that is what we would suggest. Pt told to report if she still has the rash when she comes in on the 10th. Pt also said she is very small and needs a bed. Pt advised this is a short infusion and beds are reserved for first time or long infusions and a bed cannot be guaranteed.

## 2018-12-05 NOTE — TELEPHONE ENCOUNTER
----- Message from Antionette Black sent at 12/5/2018  4:15 PM EST -----  Contact: [phone]  Pt had question about treatment plans.

## 2018-12-06 DIAGNOSIS — C34.90 SMALL CELL LUNG CANCER (HCC): Primary | ICD-10-CM

## 2018-12-10 ENCOUNTER — OFFICE VISIT (OUTPATIENT)
Dept: ONCOLOGY | Facility: CLINIC | Age: 63
End: 2018-12-10

## 2018-12-10 ENCOUNTER — INFUSION (OUTPATIENT)
Dept: ONCOLOGY | Facility: HOSPITAL | Age: 63
End: 2018-12-10

## 2018-12-10 VITALS
WEIGHT: 89.2 LBS | DIASTOLIC BLOOD PRESSURE: 74 MMHG | TEMPERATURE: 99.4 F | SYSTOLIC BLOOD PRESSURE: 138 MMHG | HEIGHT: 64 IN | RESPIRATION RATE: 16 BRPM | BODY MASS INDEX: 15.23 KG/M2 | HEART RATE: 68 BPM | OXYGEN SATURATION: 95 %

## 2018-12-10 DIAGNOSIS — C44.91 BASAL CELL CARCINOMA (BCC), UNSPECIFIED SITE: ICD-10-CM

## 2018-12-10 DIAGNOSIS — C34.90 SMALL CELL LUNG CANCER (HCC): ICD-10-CM

## 2018-12-10 DIAGNOSIS — C34.90 SMALL CELL LUNG CANCER (HCC): Primary | ICD-10-CM

## 2018-12-10 DIAGNOSIS — C34.91 SMALL CELL CARCINOMA OF RIGHT LUNG (HCC): Primary | ICD-10-CM

## 2018-12-10 DIAGNOSIS — Z45.2 FITTING AND ADJUSTMENT OF VASCULAR CATHETER: ICD-10-CM

## 2018-12-10 DIAGNOSIS — C79.9 METASTATIC CANCER (HCC): ICD-10-CM

## 2018-12-10 DIAGNOSIS — C34.91 SMALL CELL CARCINOMA OF RIGHT LUNG (HCC): ICD-10-CM

## 2018-12-10 LAB
ALBUMIN SERPL-MCNC: 4.3 G/DL (ref 3.5–5.2)
ALBUMIN/GLOB SERPL: 1.6 G/DL (ref 1.1–2.4)
ALP SERPL-CCNC: 67 U/L (ref 38–116)
ALT SERPL W P-5'-P-CCNC: 12 U/L (ref 0–33)
ANION GAP SERPL CALCULATED.3IONS-SCNC: 10.6 MMOL/L
AST SERPL-CCNC: 26 U/L (ref 0–32)
BASOPHILS # BLD AUTO: 0.02 10*3/MM3 (ref 0–0.1)
BASOPHILS NFR BLD AUTO: 0.4 % (ref 0–1.1)
BILIRUB SERPL-MCNC: 0.3 MG/DL (ref 0.1–1.2)
BUN BLD-MCNC: 13 MG/DL (ref 6–20)
BUN/CREAT SERPL: 32.5 (ref 7.3–30)
CALCIUM SPEC-SCNC: 9.8 MG/DL (ref 8.5–10.2)
CHLORIDE SERPL-SCNC: 100 MMOL/L (ref 98–107)
CO2 SERPL-SCNC: 26.4 MMOL/L (ref 22–29)
CREAT BLD-MCNC: 0.4 MG/DL (ref 0.6–1.1)
DEPRECATED RDW RBC AUTO: 45.2 FL (ref 37–49)
EOSINOPHIL # BLD AUTO: 0.03 10*3/MM3 (ref 0–0.36)
EOSINOPHIL NFR BLD AUTO: 0.6 % (ref 1–5)
ERYTHROCYTE [DISTWIDTH] IN BLOOD BY AUTOMATED COUNT: 12.3 % (ref 11.7–14.5)
GFR SERPL CREATININE-BSD FRML MDRD: >150 ML/MIN/1.73
GLOBULIN UR ELPH-MCNC: 2.7 GM/DL (ref 1.8–3.5)
GLUCOSE BLD-MCNC: 108 MG/DL (ref 74–124)
HCT VFR BLD AUTO: 40.2 % (ref 34–45)
HGB BLD-MCNC: 14 G/DL (ref 11.5–14.9)
IMM GRANULOCYTES # BLD: 0.01 10*3/MM3 (ref 0–0.03)
IMM GRANULOCYTES NFR BLD: 0.2 % (ref 0–0.5)
LYMPHOCYTES # BLD AUTO: 0.81 10*3/MM3 (ref 1–3.5)
LYMPHOCYTES NFR BLD AUTO: 15.6 % (ref 20–49)
MCH RBC QN AUTO: 34.5 PG (ref 27–33)
MCHC RBC AUTO-ENTMCNC: 34.8 G/DL (ref 32–35)
MCV RBC AUTO: 99 FL (ref 83–97)
MONOCYTES # BLD AUTO: 0.67 10*3/MM3 (ref 0.25–0.8)
MONOCYTES NFR BLD AUTO: 12.9 % (ref 4–12)
NEUTROPHILS # BLD AUTO: 3.65 10*3/MM3 (ref 1.5–7)
NEUTROPHILS NFR BLD AUTO: 70.3 % (ref 39–75)
NRBC BLD MANUAL-RTO: 0 /100 WBC (ref 0–0)
PLATELET # BLD AUTO: 197 10*3/MM3 (ref 150–375)
PMV BLD AUTO: 9.2 FL (ref 8.9–12.1)
POTASSIUM BLD-SCNC: 3.7 MMOL/L (ref 3.5–4.7)
PROT SERPL-MCNC: 7 G/DL (ref 6.3–8)
RBC # BLD AUTO: 4.06 10*6/MM3 (ref 3.9–5)
SODIUM BLD-SCNC: 137 MMOL/L (ref 134–145)
T4 SERPL-MCNC: 7.29 MCG/DL (ref 4.5–11.7)
TSH SERPL DL<=0.05 MIU/L-ACNC: 1.28 MIU/ML (ref 0.27–4.2)
WBC NRBC COR # BLD: 5.19 10*3/MM3 (ref 4–10)

## 2018-12-10 PROCEDURE — 99214 OFFICE O/P EST MOD 30 MIN: CPT | Performed by: NURSE PRACTITIONER

## 2018-12-10 PROCEDURE — 80053 COMPREHEN METABOLIC PANEL: CPT

## 2018-12-10 PROCEDURE — 84436 ASSAY OF TOTAL THYROXINE: CPT | Performed by: INTERNAL MEDICINE

## 2018-12-10 PROCEDURE — 25010000002 NIVOLUMAB 240 MG/24ML SOLUTION 24 ML VIAL: Performed by: INTERNAL MEDICINE

## 2018-12-10 PROCEDURE — 25010000002 ALTEPLASE 2 MG RECONSTITUTED SOLUTION: Performed by: NURSE PRACTITIONER

## 2018-12-10 PROCEDURE — 36593 DECLOT VASCULAR DEVICE: CPT

## 2018-12-10 PROCEDURE — 85025 COMPLETE CBC W/AUTO DIFF WBC: CPT

## 2018-12-10 PROCEDURE — 96413 CHEMO IV INFUSION 1 HR: CPT | Performed by: NURSE PRACTITIONER

## 2018-12-10 PROCEDURE — 84443 ASSAY THYROID STIM HORMONE: CPT | Performed by: INTERNAL MEDICINE

## 2018-12-10 RX ORDER — SODIUM CHLORIDE 0.9 % (FLUSH) 0.9 %
10 SYRINGE (ML) INJECTION AS NEEDED
Status: CANCELLED | OUTPATIENT
Start: 2018-12-10

## 2018-12-10 RX ORDER — SODIUM CHLORIDE 9 MG/ML
250 INJECTION, SOLUTION INTRAVENOUS ONCE
Status: COMPLETED | OUTPATIENT
Start: 2018-12-10 | End: 2018-12-10

## 2018-12-10 RX ORDER — SODIUM CHLORIDE 9 MG/ML
250 INJECTION, SOLUTION INTRAVENOUS ONCE
Status: CANCELLED | OUTPATIENT
Start: 2018-12-10

## 2018-12-10 RX ADMIN — SODIUM CHLORIDE 250 ML: 9 INJECTION, SOLUTION INTRAVENOUS at 15:39

## 2018-12-10 RX ADMIN — SODIUM CHLORIDE 240 MG: 900 INJECTION, SOLUTION INTRAVENOUS at 15:39

## 2018-12-10 RX ADMIN — ALTEPLASE 2 MG: 2.2 INJECTION, POWDER, LYOPHILIZED, FOR SOLUTION INTRAVENOUS at 14:07

## 2018-12-10 RX ADMIN — SODIUM CHLORIDE, PRESERVATIVE FREE 500 UNITS: 5 INJECTION INTRAVENOUS at 16:11

## 2018-12-10 NOTE — PROGRESS NOTES
Subjective     REASON FOR FOLLOW UP:   1.  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 Chemotherapy with carboplatin/-16 as of May 22, 2018.    2.  Progressive disease in the neck neck, adrenal gland and the right hilar mass consistent with progressive small cell lung cancer.  Nivolumab initiated 12/10/2018.                         HISTORY OF PRESENT ILLNESS:  The patient is a 63 y.o. year old female who is here for an opinion about the above issue.    History of Present Illness    The patient returns today, 12/10/2018 to initiate palliative treatment with nivolumab.  The patient is quite anxious to begin treatment.  Currently, she denies shortness of breath or chest pain.  She does report intermittent discomfort in her back, though this spontaneously occurs, and spontaneously resolves.  She continues to have poor appetite, being quite thin, this is not a new issue for the patient.  Her appetite declined while receiving chemotherapy.  She denies nausea or vomiting.  She denies any changes in her bowel or bladder habits.  She denies fevers or chills, signs or symptoms of infection.  She does report itching of the skin on her back.  She is previously seen dermatology for the treatment of actinic keratosis.  Dermatology follow-up has been delayed due to the treatment of her small cell lung cancer.  She denies headaches or blurred vision.    Past Medical History:   Diagnosis Date   • Anxiety    • Basal cell carcinoma     Follows up with Dermatology annually, [place] with Dr. Antoine's office   • Bowen's disease of vulva    • Depression    • History of kidney stone 2018   • Left adrenal mass (CMS/HCC)    • Lung cancer (CMS/HCC)     Right lung with metastasis to neck node.   • Lung mass     Bilateral   • Mitral valve prolapse    • Neuropathy    • Right renal mass     • Rosacea    • Seasonal allergies    • Shingles 2014   • Small cell carcinoma (CMS/HCC) 05/14/2018    Small cell carcinoma of the lung with metastasis to the left neck node and the left adrenal gland   • Uterine mass      Past Surgical History:   Procedure Laterality Date   • BASAL CELL CARCINOMA EXCISION     • BREAST AUGMENTATION Bilateral 1980   • BREAST SURGERY      Implants   • CATARACT EXTRACTION WITH INTRAOCULAR LENS IMPLANT     • EYE SURGERY      macular hole surgically closed/cataract removal and implant   • RETINAL LASER PROCEDURE     • US GUIDED LYMPH NODE BIOPSY  5/14/2018    Ultrasound-guided biopsy of left neck mass-Dr. Roberto Calle, [facility]   • VITRECTOMY PARS PLANA      WITH REPAIR OF MACULAR HOLE   • VULVECTOMY N/A     partial, due to Bowen's Disease       Oncologic history  patient is a 62-year-old female who had gone to the emergency room at [facility] on March 30, 2018 with intermittent severe sharp right flank pain and right back pain which started a week prior.  She also states that it worsens in the night and it lasted 20-30 minutes and was intermittent.  She had pain after eating.  In the emergency room they did a CT of the abdomen pelvis which showed a 21 mm left adrenal mass.  A 1.5 cm nonobstructing right kidney stone.  Ultrasound of the gallbladder was done which showed no evidence of acute cholecystitis.    She also had a recent mammogram April 19, 2018 which was negative.  She came in with continued pain and went to the primary care physician.  She had a repeat abdominal ultrasound on April 23, 2018.  This showed that the liver and pancreas appeared normal the gallbladder appeared normal the spleen was normal.  She had calcifications in the [place].  There is a 15 mm focus hypoechoic lesion in the right mid pole of the kidney.  Adjacent to the upper pole of the left kidney there is a solid appearing mass which is 28 mm.  This corresponds to the adrenal lesion which was seen on the CT scan  previously.    Patient  had a CT scan of the chest with contrast on May 2, 2018.  There is a large heterogeneous mass within the posterior  segment of the right upper lobe which measures approximately 8.8 x 5.6  cm and the craniocaudad span is approximately 6.6 cm. There is extension  into the posterior chest wall at the posterior right 4th-5th intercostal  space and there is extension into the right T4-5 neural foramen. There  are multiple nodules surrounding the lesion in the right upper lobe.  There is also an irregular 4 x 3 cm mass at the anterior aspect of the  left upper lobe. In addition, there is an 8 mm irregular opacity in the  left apical region and a 6 mm pulmonary nodule inferiorly in the left  upper lobe. There are 2 irregular reticular opacities in the right lower  lobe which both measure approximately 1 cm. There are no pleural or  pericardial effusions. There is ill-defined lymphadenopathy at the right  hilum. There are moderately extensive underlying emphysematous changes.  In the visualized upper abdomen, there is a 3.0 x 2.2 cm left adrenal  nodule. There is a faintly hyperenhancing 7 mm lesion within the  anterior hepatic segment.    Patient continues to have right upper back pain.  She denies any bladder or bowel incontinence.  She denies any headache.  She does not have a tissue diagnosis.  She has also seen that she developed a left neck node which has increased within the last month.  She was referred to ENT Dr. Forbes.  The patient had an endoscopy which apparently was negative.  The neck node is reasonably large about 3 into 4 cm.  She will require core needle biopsy of the neck node which is easy access.  She did lose a lot of weight.  MRI brain is negative and thoracic spine MRI does not show evidence of any cord compression except involvement and impingement at the T4 and approved.    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  This is thought to be a small cell metastatic lung cancer.  Patient is here to discuss options of treatment.    I had a lengthy discussion about consideration of chemotherapy with carboplatin/-16.    Carboplatin and -16 started 5/22 2008.  Discussed with Dr. Robin Terry at [facility], following upfront chemotherapy he does not have any maintenance clinical trials.  I believe [facility] may have maintenance clinical trial.  If very good response could consider referral to Dr. Nieto.    Cycle 3 of carboplatinum -16 given July 9, 2018  Cycle 4 carboplatin -16 on July 30, 2018    He shouldn't was referred to Dr. Nieto at the [facility] for clinical trial for extensive small cell lung cancer with ROWA -T  , patient does not want to go on that clinical trial.     Since patient completed cycle 4 of carbo -16, and had stable disease patient now is receiving radiation to the large right upper lobe lung mass with plans on doing total of 25 radiation treatments.    Status post XRT to the chest    11/28/2018:  Let neck lymphadenopathy.  Reviewed CT chest abdomen pelvis, bone scan and patient has extensive disease.  Will try immunotherapy if patient is agreeable    Current Outpatient Medications on File Prior to Visit   Medication Sig Dispense Refill   • AFLURIA QUADRIVALENT 0.5 ML suspension prefilled syringe injection ADM 0.5ML IM UTD  0   • doxycycline (VIBRAMYCIN) 100 MG capsule TK 1 C PO BID PRN  2   • Loratadine (CLARITIN CHILDRENS PO) Take 5 mL by mouth Daily.     • prochlorperazine (COMPAZINE) 10 MG tablet Take 1 tablet by mouth Every 8 (Eight) Hours As Needed for Nausea or Vomiting. 30 tablet 1     No current facility-administered medications on file prior to visit.         ALLERGIES:  No Known Allergies     Social History     Socioeconomic History   • Marital status:      Spouse name: Not on file   • Number of children: Not on file   • Years of education: Not on  file   • Highest education level: Not on file   Occupational History   • Occupation:      Comment: self-employed   Tobacco Use   • Smoking status: Current Every Day Smoker     Packs/day: 1.50     Years: 48.00     Pack years: 72.00     Types: Cigarettes     Start date: 5/30/1971   • Smokeless tobacco: Never Used   • Tobacco comment: started smoking at age 16   Substance and Sexual Activity   • Alcohol use: Yes     Types: 4 - 6 Glasses of wine per week     Comment: 2 glasses of wine a few nights per week   • Drug use: No   • Sexual activity: No   Social History Narrative    Works as a .  Lives at home with her daughter.          Family History   Problem Relation Age of Onset   • Other Mother         Cerebral hemorrhage   • Heart disease Father    • Thyroid cancer Sister    • No Known Problems Brother    • Malig Hyperthermia Neg Hx       I have reviewed the patient's medical history in detail and updated the computerized patient record.    Review of Systems   Constitutional: Positive for fatigue and unexpected weight change. Negative for chills and fever.   HENT: Negative for mouth sores and sore throat.    Eyes: Negative for visual disturbance.   Respiratory: Negative for cough, chest tightness and shortness of breath.    Cardiovascular: Negative for chest pain, palpitations and leg swelling.   Gastrointestinal: Negative for abdominal pain, blood in stool, diarrhea, nausea and vomiting.   Genitourinary: Negative for dysuria and frequency.   Musculoskeletal: Negative for arthralgias, joint swelling, myalgias and neck stiffness.   Skin:        +itching   Neurological: Negative for dizziness, weakness and headaches.   Hematological: Positive for adenopathy. Does not bruise/bleed easily.   Psychiatric/Behavioral: The patient is not nervous/anxious.    All other systems reviewed and are negative.  Review of systems unchanged from previous office visit except as updated.     Objective     Vitals:  "   12/10/18 1413   BP: 138/74   Pulse: 68   Resp: 16   Temp: 99.4 °F (37.4 °C)   TempSrc: Oral   SpO2: 95%   Weight: 40.5 kg (89 lb 3.2 oz)   Height: 163.5 cm (64.37\")   PainSc: 0-No pain     Current Status 12/10/2018   ECOG score 0       Physical Exam    GENERAL:  Thin female in no acute distress.   SKIN:  Warm, dry without rashes, purpura or petechiae.  EYES:  Pupils equal, round and reactive to light.  EOMs intact.  Conjunctivae normal.  EARS:  Hearing intact.  NOSE:  Septum midline.  No excoriations or nasal discharge.  MOUTH:  Tongue is well-papillated; no stomatitis or ulcers.  Lips normal.  THROAT:  Oropharynx without lesions or exudates.  NECK:  Patient has 2.5 cm left neck lymphadenopathy high, posterior chain. This is nontender, no erythema, fixed.  LYMPHATICS:  No cervical, supraclavicular, axillary  Adenopathy except as noted above.  CHEST:  Lungs clear to auscultation. Good airflow.  CARDIAC:  Regular rate and rhythm with murmur.  ABDOMEN:  Soft, nontender, not distended, bowel sounds present.   EXTREMITIES:  No clubbing, cyanosis or edema.  NEUROLOGICAL:  Cranial Nerves II-XII grossly intact.  No focal neurological deficits.  PSYCHIATRIC:  Normal affect and mood.    Physical exam unchanged from previous office visit except as updated.     RECENT LABS:  Results from last 7 days   Lab Units  12/10/18   1352   WBC 10*3/mm3  5.19   NEUTROS ABS 10*3/mm3  3.65   HEMOGLOBIN g/dL  14.0   HEMATOCRIT %  40.2   PLATELETS 10*3/mm3  197     Results from last 7 days   Lab Units  12/10/18   1352   SODIUM mmol/L  137   POTASSIUM mmol/L  3.7   CHLORIDE mmol/L  100   CO2 mmol/L  26.4   BUN mg/dL  13   CREATININE mg/dL  0.40*   CALCIUM mg/dL  9.8   ALBUMIN g/dL  4.30   BILIRUBIN mg/dL  0.3   ALK PHOS U/L  67   ALT (SGPT) U/L  12   AST (SGOT) U/L  26   GLUCOSE mg/dL  108       Assessment/Plan     1. Extensive stage small cell lung cancer.  Bilateral lung nodules, with a large 8 cm ×6 cm right upper lobe lung nodule with " extension into the intercostal space between the fourth and fifth rib and also extending into the thoracic 4-5 neural foramina.  Patient has bilateral pulmonary nodular opacities which are suspicious for metastasis.  Patient also has a 3 cm left adrenal nodule suspicious for metastasis.  Further evaluation with a PET CT is recommended.  She has distant metastases to the adrenal gland.  Pathology consistent with small cell consistent with a lung primary.  MRI brain negative and MRI of the spine shows no impingement without any cord compression.     Chemotherapy initiated 5/22/2018 with carboplatin -16.  Plans to administer 4-6 cycles of chemotherapy. CT scan after completion of 3 cycles shows significant response. Patient was referred to  or clinical trial with ROWA-T though she declined.   The patient completed radiation to the right upper lobe lung lesion 10/30/2018.  Unfortunately, CT scans 11/28/2018 with disease progression, most notably with left neck lymphadenopathy.  Bone scan and MRI of the brain negative We discussed CPT-11 versus immunotherapy.  The patient wished to proceed with immunotherapy. Nivolumab initiated 12/10/218.    2.  Neutropenia prophylaxis.  Previously requiring Neulasta, this should not be an issue with the initiation of immunotherapy.     3.  Anxiety and depression. The patient was previously referred to behavioral oncology.    4.  Venous access.  Port placed by Dr. Granger 5/25/2018.     5.  History of basal cell carcinoma.  She was encouraged to follow-up with dermatology if necessary regarding actinic keratosis.     Plan:  1. Proceed with cycle 1 nivolumab today  2. Return in 1 week for CBC, CMP NP evaluation, toxicity check.  3. MD follow up in 2 weeks for CBC, CMP, cycle 2 nivolumab  4. MD follow up with Dr. Canada in 4 weeks for CBC, CMP, cycle 3 nivolumab.     Total of 30 minutes spent with the patient, 25 minutes spent in face-to-face counseling and education.  We  did review potential side effects of immunotherapy, reasons to call the office, lab monitoring and prognosis of disease state.    Uyen Skelton, APRN  12/10/2018     Cc: Dr. Kumar Blair

## 2018-12-12 ENCOUNTER — TELEPHONE (OUTPATIENT)
Dept: ONCOLOGY | Facility: HOSPITAL | Age: 63
End: 2018-12-12

## 2018-12-12 NOTE — TELEPHONE ENCOUNTER
"----- Message from Hansa Rutherford sent at 12/12/2018 11:48 AM EST -----  Contact: [phone]  Pt has questions about her chemo treatment      Pt had several questions about her treatment. She took her first dose of Opdivo Monday and has been having low grade nausea ever since. She also has not had much of an appetite. She is not taking anything for nausea. Recommended she take compazine on a scheduled basis for a few days to see if this helps. She v/u.   Pt had another question about a \"searing\" pain in the middle of her back on the right side. It is intermittent, but it sometimes keeps her awake. She took a childrens motrin for this but it did not help. Reviewed with Uyen Skelton NP and she advised pt to try Aleve to see if this works better. Informed pt and she v/u.  Pt also mentioned that the nodule on her neck has enlarged since her visit with Dr. Canada a few weeks ago. Dr. Canada not in the office. Reviewed with Uyen Skelton NP. Per Uyen, the Opdivo has not had time to work since she just got her first treatment on Monday. We will need to give the medication time to work. Informed pt and she v/u.   "

## 2018-12-15 RX ORDER — METHYLPREDNISOLONE 4 MG/1
TABLET ORAL
Qty: 1 EACH | Refills: 0 | Status: SHIPPED | OUTPATIENT
Start: 2018-12-15 | End: 2018-12-24

## 2018-12-15 NOTE — PROGRESS NOTES
On call note:  Contacted by patient regarding severe pruritus following first dose nivolumab on 12/10/18.  She had already attempted use of topical hydrocortisone with no improvement.  There was no associated rash.  Pruritus interfering with activities and sleep.  Attempted treatment today with Benadryl with no effect.  Will begin Medrol Dosepak and asked patient to continue on Zyrtec or Allegra or Claritin scheduled along with 2-3 times daily application of hydrocortisone cream.

## 2018-12-17 ENCOUNTER — INFUSION (OUTPATIENT)
Dept: ONCOLOGY | Facility: HOSPITAL | Age: 63
End: 2018-12-17

## 2018-12-17 ENCOUNTER — OFFICE VISIT (OUTPATIENT)
Dept: ONCOLOGY | Facility: CLINIC | Age: 63
End: 2018-12-17

## 2018-12-17 ENCOUNTER — TELEPHONE (OUTPATIENT)
Dept: ONCOLOGY | Facility: HOSPITAL | Age: 63
End: 2018-12-17

## 2018-12-17 VITALS
OXYGEN SATURATION: 97 % | RESPIRATION RATE: 16 BRPM | DIASTOLIC BLOOD PRESSURE: 74 MMHG | WEIGHT: 88.7 LBS | TEMPERATURE: 99.2 F | SYSTOLIC BLOOD PRESSURE: 126 MMHG | HEIGHT: 64 IN | HEART RATE: 66 BPM | BODY MASS INDEX: 15.14 KG/M2

## 2018-12-17 DIAGNOSIS — F19.982 DRUG-INDUCED INSOMNIA (HCC): ICD-10-CM

## 2018-12-17 DIAGNOSIS — Z45.2 FITTING AND ADJUSTMENT OF VASCULAR CATHETER: ICD-10-CM

## 2018-12-17 DIAGNOSIS — C34.90 SMALL CELL LUNG CANCER (HCC): Primary | ICD-10-CM

## 2018-12-17 DIAGNOSIS — L29.9 PRURITUS: ICD-10-CM

## 2018-12-17 DIAGNOSIS — C44.91 BASAL CELL CARCINOMA (BCC), UNSPECIFIED SITE: ICD-10-CM

## 2018-12-17 DIAGNOSIS — C34.91 SMALL CELL CARCINOMA OF RIGHT LUNG (HCC): Primary | ICD-10-CM

## 2018-12-17 LAB
ALBUMIN SERPL-MCNC: 4.3 G/DL (ref 3.5–5.2)
ALBUMIN/GLOB SERPL: 1.5 G/DL (ref 1.1–2.4)
ALP SERPL-CCNC: 58 U/L (ref 38–116)
ALT SERPL W P-5'-P-CCNC: 14 U/L (ref 0–33)
ANION GAP SERPL CALCULATED.3IONS-SCNC: 13.7 MMOL/L
AST SERPL-CCNC: 22 U/L (ref 0–32)
BASOPHILS # BLD AUTO: 0.02 10*3/MM3 (ref 0–0.1)
BASOPHILS NFR BLD AUTO: 0.3 % (ref 0–1.1)
BILIRUB SERPL-MCNC: 0.3 MG/DL (ref 0.1–1.2)
BUN BLD-MCNC: 18 MG/DL (ref 6–20)
BUN/CREAT SERPL: 40 (ref 7.3–30)
CALCIUM SPEC-SCNC: 9.8 MG/DL (ref 8.5–10.2)
CHLORIDE SERPL-SCNC: 97 MMOL/L (ref 98–107)
CO2 SERPL-SCNC: 27.3 MMOL/L (ref 22–29)
CREAT BLD-MCNC: 0.45 MG/DL (ref 0.6–1.1)
DEPRECATED RDW RBC AUTO: 45.8 FL (ref 37–49)
EOSINOPHIL # BLD AUTO: 0.01 10*3/MM3 (ref 0–0.36)
EOSINOPHIL NFR BLD AUTO: 0.1 % (ref 1–5)
ERYTHROCYTE [DISTWIDTH] IN BLOOD BY AUTOMATED COUNT: 12.4 % (ref 11.7–14.5)
GFR SERPL CREATININE-BSD FRML MDRD: 140 ML/MIN/1.73
GLOBULIN UR ELPH-MCNC: 2.8 GM/DL (ref 1.8–3.5)
GLUCOSE BLD-MCNC: 111 MG/DL (ref 74–124)
HCT VFR BLD AUTO: 36.9 % (ref 34–45)
HGB BLD-MCNC: 13 G/DL (ref 11.5–14.9)
IMM GRANULOCYTES # BLD: 0.02 10*3/MM3 (ref 0–0.03)
IMM GRANULOCYTES NFR BLD: 0.3 % (ref 0–0.5)
LYMPHOCYTES # BLD AUTO: 0.71 10*3/MM3 (ref 1–3.5)
LYMPHOCYTES NFR BLD AUTO: 9.5 % (ref 20–49)
MCH RBC QN AUTO: 35.2 PG (ref 27–33)
MCHC RBC AUTO-ENTMCNC: 35.2 G/DL (ref 32–35)
MCV RBC AUTO: 100 FL (ref 83–97)
MONOCYTES # BLD AUTO: 0.73 10*3/MM3 (ref 0.25–0.8)
MONOCYTES NFR BLD AUTO: 9.7 % (ref 4–12)
NEUTROPHILS # BLD AUTO: 6.02 10*3/MM3 (ref 1.5–7)
NEUTROPHILS NFR BLD AUTO: 80.1 % (ref 39–75)
NRBC BLD MANUAL-RTO: 0 /100 WBC (ref 0–0)
PLATELET # BLD AUTO: 228 10*3/MM3 (ref 150–375)
PMV BLD AUTO: 9.4 FL (ref 8.9–12.1)
POTASSIUM BLD-SCNC: 3.6 MMOL/L (ref 3.5–4.7)
PROT SERPL-MCNC: 7.1 G/DL (ref 6.3–8)
RBC # BLD AUTO: 3.69 10*6/MM3 (ref 3.9–5)
SODIUM BLD-SCNC: 138 MMOL/L (ref 134–145)
WBC NRBC COR # BLD: 7.51 10*3/MM3 (ref 4–10)

## 2018-12-17 PROCEDURE — 80053 COMPREHEN METABOLIC PANEL: CPT

## 2018-12-17 PROCEDURE — 96523 IRRIG DRUG DELIVERY DEVICE: CPT | Performed by: NURSE PRACTITIONER

## 2018-12-17 PROCEDURE — 85025 COMPLETE CBC W/AUTO DIFF WBC: CPT

## 2018-12-17 PROCEDURE — 99214 OFFICE O/P EST MOD 30 MIN: CPT | Performed by: NURSE PRACTITIONER

## 2018-12-17 RX ORDER — SODIUM CHLORIDE 0.9 % (FLUSH) 0.9 %
10 SYRINGE (ML) INJECTION AS NEEDED
Status: CANCELLED | OUTPATIENT
Start: 2018-12-17

## 2018-12-17 RX ORDER — DOXEPIN HYDROCHLORIDE 10 MG/1
10 CAPSULE ORAL NIGHTLY
Qty: 30 CAPSULE | Refills: 0 | Status: SHIPPED | OUTPATIENT
Start: 2018-12-17

## 2018-12-17 RX ORDER — DOXEPIN HYDROCHLORIDE 10 MG/1
150 CAPSULE ORAL NIGHTLY
Qty: 30 CAPSULE | Refills: 0 | Status: SHIPPED | OUTPATIENT
Start: 2018-12-17 | End: 2018-12-17 | Stop reason: SDUPTHER

## 2018-12-17 RX ORDER — SODIUM CHLORIDE 0.9 % (FLUSH) 0.9 %
10 SYRINGE (ML) INJECTION AS NEEDED
Status: DISCONTINUED | OUTPATIENT
Start: 2018-12-17 | End: 2018-12-17 | Stop reason: HOSPADM

## 2018-12-17 RX ORDER — HEPARIN SODIUM (PORCINE) LOCK FLUSH IV SOLN 100 UNIT/ML 100 UNIT/ML
500 SOLUTION INTRAVENOUS AS NEEDED
Status: CANCELLED | OUTPATIENT
Start: 2018-12-17

## 2018-12-17 RX ORDER — HYDROXYZINE HYDROCHLORIDE 25 MG/1
25 TABLET, FILM COATED ORAL 3 TIMES DAILY PRN
Qty: 30 TABLET | Refills: 1 | Status: SHIPPED | OUTPATIENT
Start: 2018-12-17

## 2018-12-17 RX ADMIN — Medication 10 ML: at 13:44

## 2018-12-17 RX ADMIN — SODIUM CHLORIDE, PRESERVATIVE FREE 500 UNITS: 5 INJECTION INTRAVENOUS at 13:44

## 2018-12-17 NOTE — TELEPHONE ENCOUNTER
----- Message from Johnny Alegria sent at 12/17/2018  3:07 PM EST -----  Contact: [phone]  Call [facility] about medication

## 2018-12-17 NOTE — PROGRESS NOTES
Subjective     REASON FOR FOLLOW UP:   1.  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 Chemotherapy with carboplatin/-16 as of May 22, 2018.    2.  Progressive disease in the neck neck, adrenal gland and the right hilar mass consistent with progressive small cell lung cancer.  Nivolumab initiated 12/10/2018.                         HISTORY OF PRESENT ILLNESS:  The patient is a 63 y.o. year old female who is here for an opinion about the above issue.    History of Present Illness    The patient returns today, 12/17/2018 for 1 week toxicity check having initiated nivolumab 1 week ago, 12/10/2018.  Thankfully, the patient tolerated treatment reasonably well.  When evaluated 1 week ago, she did report itching most notably on her back, this was present prior to the initiation of nivolumab, unfortunately, since receiving treatment, the patient itching has worsened.  She has been utilizing Benadryl which does resolve and restlessness.  She did call the on-call physician over the weekend and was started on a Medrol Dosepak.  She reports her symptoms have not improved at all since beginning the steroids.  Because of the steroid and restlessness from Benadryl, she is suffering from severe insomnia.  The patient is very frustrated today regarding her lack of sleep.  She denies a rash.  She denies any pain, shortness of breath, fevers or chills.  She denies any change in her bowel or bladder habits.    Past Medical History:   Diagnosis Date   • Anxiety    • Basal cell carcinoma     Follows up with Dermatology annually, PA with Dr. Antoine's office   • Bowen's disease of vulva    • Depression    • History of kidney stone 2018   • Left adrenal mass (CMS/HCC)    • Lung cancer (CMS/HCC)     Right lung with metastasis to neck node.   • Lung mass     Bilateral   • Mitral  valve prolapse    • Neuropathy    • Right renal mass    • Rosacea    • Seasonal allergies    • Shingles 2014   • Small cell carcinoma (CMS/HCC) 05/14/2018    Small cell carcinoma of the lung with metastasis to the left neck node and the left adrenal gland   • Uterine mass      Past Surgical History:   Procedure Laterality Date   • BASAL CELL CARCINOMA EXCISION     • BREAST AUGMENTATION Bilateral 1980   • BREAST SURGERY      Implants   • CATARACT EXTRACTION WITH INTRAOCULAR LENS IMPLANT     • EYE SURGERY      macular hole surgically closed/cataract removal and implant   • RETINAL LASER PROCEDURE     • US GUIDED LYMPH NODE BIOPSY  5/14/2018    Ultrasound-guided biopsy of left neck mass-Dr. Roberto Calle, [facility]   • VITRECTOMY PARS PLANA      WITH REPAIR OF MACULAR HOLE   • VULVECTOMY N/A     partial, due to Bowen's Disease       Oncologic history  patient is a 62-year-old female who had gone to the emergency room at [facility] on March 30, 2018 with intermittent severe sharp right flank pain and right back pain which started a week prior.  She also states that it worsens in the night and it lasted 20-30 minutes and was intermittent.  She had pain after eating.  In the emergency room they did a CT of the abdomen pelvis which showed a 21 mm left adrenal mass.  A 1.5 cm nonobstructing right kidney stone.  Ultrasound of the gallbladder was done which showed no evidence of acute cholecystitis.    She also had a recent mammogram April 19, 2018 which was negative.  She came in with continued pain and went to the primary care physician.  She had a repeat abdominal ultrasound on April 23, 2018.  This showed that the liver and pancreas appeared normal the gallbladder appeared normal the spleen was normal.  She had calcifications in the [place].  There is a 15 mm focus hypoechoic lesion in the right mid pole of the kidney.  Adjacent to the upper pole of the left kidney there is a solid appearing mass which is 28 mm.  This corresponds  to the adrenal lesion which was seen on the CT scan previously.    Patient  had a CT scan of the chest with contrast on May 2, 2018.  There is a large heterogeneous mass within the posterior  segment of the right upper lobe which measures approximately 8.8 x 5.6  cm and the craniocaudad span is approximately 6.6 cm. There is extension  into the posterior chest wall at the posterior right 4th-5th intercostal  space and there is extension into the right T4-5 neural foramen. There  are multiple nodules surrounding the lesion in the right upper lobe.  There is also an irregular 4 x 3 cm mass at the anterior aspect of the  left upper lobe. In addition, there is an 8 mm irregular opacity in the  left apical region and a 6 mm pulmonary nodule inferiorly in the left  upper lobe. There are 2 irregular reticular opacities in the right lower  lobe which both measure approximately 1 cm. There are no pleural or  pericardial effusions. There is ill-defined lymphadenopathy at the right  hilum. There are moderately extensive underlying emphysematous changes.  In the visualized upper abdomen, there is a 3.0 x 2.2 cm left adrenal  nodule. There is a faintly hyperenhancing 7 mm lesion within the  anterior hepatic segment.    Patient continues to have right upper back pain.  She denies any bladder or bowel incontinence.  She denies any headache.  She does not have a tissue diagnosis.  She has also seen that she developed a left neck node which has increased within the last month.  She was referred to ENT Dr. Forbes.  The patient had an endoscopy which apparently was negative.  The neck node is reasonably large about 3 into 4 cm.  She will require core needle biopsy of the neck node which is easy access.  She did lose a lot of weight.  MRI brain is negative and thoracic spine MRI does not show evidence of any cord compression except involvement and impingement at the T4 and approved.    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  This is thought to be a small cell metastatic lung cancer.  Patient is here to discuss options of treatment.    I had a lengthy discussion about consideration of chemotherapy with carboplatin/-16.    Carboplatin and -16 started 5/22 2008.  Discussed with Dr. Robin Terry at [facility], following upfront chemotherapy he does not have any maintenance clinical trials.  I believe [facility] may have maintenance clinical trial.  If very good response could consider referral to Dr. Nieto.    Cycle 3 of carboplatinum -16 given July 9, 2018  Cycle 4 carboplatin -16 on July 30, 2018    He shouldn't was referred to Dr. Nieto at the [facility] for clinical trial for extensive small cell lung cancer with ROWA -T  , patient does not want to go on that clinical trial.     Since patient completed cycle 4 of carbo -16, and had stable disease patient now is receiving radiation to the large right upper lobe lung mass with plans on doing total of 25 radiation treatments.    Status post XRT to the chest    11/28/2018:  Let neck lymphadenopathy.  Reviewed CT chest abdomen pelvis, bone scan and patient has extensive disease.  Will try immunotherapy if patient is agreeable    Current Outpatient Medications on File Prior to Visit   Medication Sig Dispense Refill   • AFLURIA QUADRIVALENT 0.5 ML suspension prefilled syringe injection ADM 0.5ML IM UTD  0   • doxycycline (VIBRAMYCIN) 100 MG capsule TK 1 C PO BID PRN  2   • Loratadine (CLARITIN CHILDRENS PO) Take 5 mL by mouth Daily.     • MethylPREDNISolone (MEDROL, ROCÍO,) 4 MG tablet Take as directed on package instructions. 1 each 0   • prochlorperazine (COMPAZINE) 10 MG tablet Take 1 tablet by mouth Every 8 (Eight) Hours As Needed for Nausea or Vomiting. 30 tablet 1     No current facility-administered medications on file prior to visit.         ALLERGIES:  No Known Allergies      Social History     Socioeconomic History   • Marital status:      Spouse name: Not on file   • Number of children: Not on file   • Years of education: Not on file   • Highest education level: Not on file   Occupational History   • Occupation:      Comment: self-employed   Tobacco Use   • Smoking status: Current Every Day Smoker     Packs/day: 1.50     Years: 48.00     Pack years: 72.00     Types: Cigarettes     Start date: 5/30/1971   • Smokeless tobacco: Never Used   • Tobacco comment: started smoking at age 16   Substance and Sexual Activity   • Alcohol use: Yes     Types: 4 - 6 Glasses of wine per week     Comment: 2 glasses of wine a few nights per week   • Drug use: No   • Sexual activity: No   Social History Narrative    Works as a .  Lives at home with her daughter.          Family History   Problem Relation Age of Onset   • Other Mother         Cerebral hemorrhage   • Heart disease Father    • Thyroid cancer Sister    • No Known Problems Brother    • Malig Hyperthermia Neg Hx       I have reviewed the patient's medical history in detail and updated the computerized patient record.    Review of Systems   Constitutional: Positive for unexpected weight change.   HENT: Negative for mouth sores.    Eyes: Negative for visual disturbance.   Respiratory: Negative for chest tightness and shortness of breath.    Cardiovascular: Negative for palpitations and leg swelling.   Gastrointestinal: Negative for blood in stool and diarrhea.   Genitourinary: Negative for dysuria and frequency.   Musculoskeletal: Negative for neck stiffness.   Skin:        +itching   Neurological: Negative for dizziness.   Hematological: Positive for adenopathy. Does not bruise/bleed easily.   Psychiatric/Behavioral: The patient is nervous/anxious.    All other systems reviewed and are negative.  Review of systems unchanged from previous office visit except as updated.     Objective     Vitals:    12/17/18  "1354   BP: 126/74   Pulse: 66   Resp: 16   Temp: 99.2 °F (37.3 °C)   TempSrc: Oral   SpO2: 97%   Weight: 40.2 kg (88 lb 11.2 oz)   Height: 163.5 cm (64.37\")   PainSc: 0-No pain  Comment: lung cancer     Current Status 12/17/2018   ECOG score 0       Physical Exam    GENERAL:  Thin female in no acute distress.   SKIN:  Warm, dry without rashes, purpura.  No rash present, only minimal petechiae on the left forearm from scratching.  EYES:  Pupils equal, round and reactive to light.  EOMs intact.  Conjunctivae normal.  EARS:  Hearing intact.  NOSE:  Septum midline.  No excoriations or nasal discharge.  MOUTH:  Tongue is well-papillated; no stomatitis or ulcers.  Lips normal.  THROAT:  Oropharynx without lesions or exudates.  NECK:  Patient has 2.5 cm left neck lymphadenopathy high, posterior chain. This is nontender, no erythema, fixed.  This is unchanged from visit 1 week ago.  LYMPHATICS:  No cervical, supraclavicular, axillary  Adenopathy except as noted above.  CHEST:  Lungs clear to auscultation. Good airflow.  CARDIAC:  Regular rate and rhythm with murmur.  ABDOMEN:  Soft, nontender, not distended, bowel sounds present.   EXTREMITIES:  No clubbing, cyanosis or edema.  NEUROLOGICAL:  Cranial Nerves II-XII grossly intact.  No focal neurological deficits.  PSYCHIATRIC:  Normal affect and mood.    Physical exam unchanged from previous office visit except as updated.     RECENT LABS:  Results from last 7 days   Lab Units  12/17/18   1334   WBC 10*3/mm3  7.51   NEUTROS ABS 10*3/mm3  6.02   HEMOGLOBIN g/dL  13.0   HEMATOCRIT %  36.9   PLATELETS 10*3/mm3  228     Results from last 7 days   Lab Units  12/17/18   1334   SODIUM mmol/L  138   POTASSIUM mmol/L  3.6   CHLORIDE mmol/L  97*   CO2 mmol/L  27.3   BUN mg/dL  18   CREATININE mg/dL  0.45*   CALCIUM mg/dL  9.8   ALBUMIN g/dL  4.30   BILIRUBIN mg/dL  0.3   ALK PHOS U/L  58   ALT (SGPT) U/L  14   AST (SGOT) U/L  22   GLUCOSE mg/dL  111       Assessment/Plan     1. " Extensive stage small cell lung cancer.  Bilateral lung nodules, with a large 8 cm ×6 cm right upper lobe lung nodule with extension into the intercostal space between the fourth and fifth rib and also extending into the thoracic 4-5 neural foramina.  Patient has bilateral pulmonary nodular opacities which are suspicious for metastasis.  Patient also has a 3 cm left adrenal nodule suspicious for metastasis.  Further evaluation with a PET CT is recommended.  She has distant metastases to the adrenal gland.  Pathology consistent with small cell consistent with a lung primary.  MRI brain negative and MRI of the spine shows no impingement without any cord compression.     Chemotherapy initiated 5/22/2018 with carboplatin -16.  Plans to administer 4-6 cycles of chemotherapy. CT scan after completion of 3 cycles shows significant response. Patient was referred to  or clinical trial with ROWA-T though she declined.   The patient completed radiation to the right upper lobe lung lesion 10/30/2018.  Unfortunately, CT scans 11/28/2018 with disease progression, most notably with left neck lymphadenopathy.  Bone scan and MRI of the brain negative We discussed CPT-11 versus immunotherapy.  The patient wished to proceed with immunotherapy. Nivolumab initiated 12/10/218.    2.  Neutropenia prophylaxis.  Previously requiring Neulasta, this should not be an issue with the initiation of immunotherapy.     3.  Anxiety and depression. The patient was previously referred to behavioral oncology.    4.  Venous access.  Port placed by Dr. Granger 5/25/2018.     5.  History of basal cell carcinoma.  She was encouraged to follow-up with dermatology if necessary regarding actinic keratosis.     6.  Significant pruritus.  This was present prior to the initiation of nivolumab, and has recently worsened.  There is no rash present.  This did not improve with Medrol Dosepak.  Currently utilizing hydrocortisone cream and Benadryl, though  Benadryl is resulting in hyperactivity.    7.  Insomnia secondary to itching and hyperactivity from Benadryl as well as steroids.    Plan:  1. Discontinue Medrol Dosepak, as the patient does not have a rash present and is not receiving benefit in her pruritus.  2. Begin Atarax 25 mg 3 times a day as needed for itching  3. Doxepin 10 mg nightly for sleep.  The patient reports this is previously been beneficial for sleep for her.  4. Discontinue Benadryl.  5. Continue topical hydrocortisone if this is therapeutic, the patient does not have visible rash.  6. MD follow up in 1 weeks for CBC, CMP, cycle 2 nivolumab  7. MD follow up with Dr. Canada in 3 weeks for CBC, CMP, cycle 3 nivolumab.     Today's plan was discussed reviewed with Dr. Canada who is in agreement.  She did also evaluate the patient, we are in agreement that the patient's pruritus is likely not related to nivolumab    Uyen Skelton, APRN  12/17/2018     Cc: Dr. Kumar Blair

## 2018-12-17 NOTE — TELEPHONE ENCOUNTER
Received a call from Prisca with [facility] pharmacy, clarified Sinequan dose. Per KASSIE Qiu, pt to take 10 mg, 1 capsule nightly.

## 2018-12-24 ENCOUNTER — APPOINTMENT (OUTPATIENT)
Dept: MRI IMAGING | Facility: HOSPITAL | Age: 63
End: 2018-12-24

## 2018-12-24 ENCOUNTER — HOSPITAL ENCOUNTER (INPATIENT)
Facility: HOSPITAL | Age: 63
LOS: 13 days | Discharge: NURSING, REHAB OR LONG-TERM CARE | End: 2019-01-06
Attending: EMERGENCY MEDICINE | Admitting: INTERNAL MEDICINE

## 2018-12-24 ENCOUNTER — APPOINTMENT (OUTPATIENT)
Dept: GENERAL RADIOLOGY | Facility: HOSPITAL | Age: 63
End: 2018-12-24

## 2018-12-24 ENCOUNTER — APPOINTMENT (OUTPATIENT)
Dept: CT IMAGING | Facility: HOSPITAL | Age: 63
End: 2018-12-24

## 2018-12-24 DIAGNOSIS — R41.82 ALTERED MENTAL STATUS, UNSPECIFIED ALTERED MENTAL STATUS TYPE: Primary | ICD-10-CM

## 2018-12-24 DIAGNOSIS — R53.1 GENERAL WEAKNESS: ICD-10-CM

## 2018-12-24 DIAGNOSIS — C34.91 PRIMARY MALIGNANT NEOPLASM OF RIGHT LUNG METASTATIC TO OTHER SITE (HCC): ICD-10-CM

## 2018-12-24 DIAGNOSIS — E87.1 HYPONATREMIA: ICD-10-CM

## 2018-12-24 LAB
ABO GROUP BLD: NORMAL
ALBUMIN SERPL-MCNC: 4.3 G/DL (ref 3.5–5.2)
ALBUMIN/GLOB SERPL: 1.5 G/DL
ALP SERPL-CCNC: 61 U/L (ref 39–117)
ALT SERPL W P-5'-P-CCNC: 14 U/L (ref 1–33)
ANION GAP SERPL CALCULATED.3IONS-SCNC: 18.2 MMOL/L
AST SERPL-CCNC: 25 U/L (ref 1–32)
BASOPHILS # BLD AUTO: 0 10*3/MM3 (ref 0–0.2)
BASOPHILS NFR BLD AUTO: 0 % (ref 0–1.5)
BILIRUB SERPL-MCNC: 0.9 MG/DL (ref 0.1–1.2)
BILIRUB UR QL STRIP: NEGATIVE
BLD GP AB SCN SERPL QL: NEGATIVE
BUN BLD-MCNC: 21 MG/DL (ref 8–23)
BUN/CREAT SERPL: 33.9 (ref 7–25)
CALCIUM SPEC-SCNC: 9.8 MG/DL (ref 8.6–10.5)
CHLORIDE SERPL-SCNC: 82 MMOL/L (ref 98–107)
CLARITY UR: CLEAR
CO2 SERPL-SCNC: 22.8 MMOL/L (ref 22–29)
COLOR UR: YELLOW
CORTIS SERPL-MCNC: 28.5 MCG/DL
CREAT BLD-MCNC: 0.62 MG/DL (ref 0.57–1)
DEPRECATED RDW RBC AUTO: 40 FL (ref 37–54)
EOSINOPHIL # BLD AUTO: 0 10*3/MM3 (ref 0–0.7)
EOSINOPHIL NFR BLD AUTO: 0 % (ref 0.3–6.2)
ERYTHROCYTE [DISTWIDTH] IN BLOOD BY AUTOMATED COUNT: 11.9 % (ref 11.7–13)
GFR SERPL CREATININE-BSD FRML MDRD: 97 ML/MIN/1.73
GLOBULIN UR ELPH-MCNC: 2.9 GM/DL
GLUCOSE BLD-MCNC: 90 MG/DL (ref 65–99)
GLUCOSE UR STRIP-MCNC: NEGATIVE MG/DL
HCT VFR BLD AUTO: 42 % (ref 35.6–45.5)
HGB BLD-MCNC: 14 G/DL (ref 11.9–15.5)
HGB UR QL STRIP.AUTO: NEGATIVE
HOLD SPECIMEN: NORMAL
HOLD SPECIMEN: NORMAL
IMM GRANULOCYTES # BLD AUTO: 0.02 10*3/MM3 (ref 0–0.03)
IMM GRANULOCYTES NFR BLD AUTO: 0.2 % (ref 0–0.5)
INR PPP: 1.05 (ref 0.9–1.1)
KETONES UR QL STRIP: ABNORMAL
LEUKOCYTE ESTERASE UR QL STRIP.AUTO: NEGATIVE
LYMPHOCYTES # BLD AUTO: 1.56 10*3/MM3 (ref 0.9–4.8)
LYMPHOCYTES NFR BLD AUTO: 16 % (ref 19.6–45.3)
MCH RBC QN AUTO: 33.7 PG (ref 26.9–32)
MCHC RBC AUTO-ENTMCNC: 33.3 G/DL (ref 32.4–36.3)
MCV RBC AUTO: 100.9 FL (ref 80.5–98.2)
MONOCYTES # BLD AUTO: 0.06 10*3/MM3 (ref 0.2–1.2)
MONOCYTES NFR BLD AUTO: 0.6 % (ref 5–12)
NEUTROPHILS # BLD AUTO: 8.09 10*3/MM3 (ref 1.9–8.1)
NEUTROPHILS NFR BLD AUTO: 83.2 % (ref 42.7–76)
NITRITE UR QL STRIP: NEGATIVE
OSMOLALITY SERPL: 263 MOSM/KG (ref 280–301)
PH UR STRIP.AUTO: 6.5 [PH] (ref 5–8)
PLAT MORPH BLD: NORMAL
PLATELET # BLD AUTO: 283 10*3/MM3 (ref 140–500)
PMV BLD AUTO: 9.3 FL (ref 6–12)
POTASSIUM BLD-SCNC: 3.1 MMOL/L (ref 3.5–5.2)
PROT SERPL-MCNC: 7.2 G/DL (ref 6–8.5)
PROT UR QL STRIP: NEGATIVE
PROTHROMBIN TIME: 13.5 SECONDS (ref 11.7–14.2)
RBC # BLD AUTO: 4.16 10*6/MM3 (ref 3.9–5.2)
RBC MORPH BLD: NORMAL
RH BLD: POSITIVE
SODIUM BLD-SCNC: 123 MMOL/L (ref 136–145)
SODIUM BLD-SCNC: 127 MMOL/L (ref 136–145)
SODIUM UR-SCNC: 24 MMOL/L
SP GR UR STRIP: 1.01 (ref 1–1.03)
T&S EXPIRATION DATE: NORMAL
TROPONIN T SERPL-MCNC: <0.01 NG/ML (ref 0–0.03)
TSH SERPL DL<=0.05 MIU/L-ACNC: 0.66 MIU/ML (ref 0.27–4.2)
UROBILINOGEN UR QL STRIP: ABNORMAL
VIT B12 BLD-MCNC: 1655 PG/ML (ref 211–946)
WBC MORPH BLD: NORMAL
WBC NRBC COR # BLD: 9.73 10*3/MM3 (ref 4.5–10.7)
WHOLE BLOOD HOLD SPECIMEN: NORMAL
WHOLE BLOOD HOLD SPECIMEN: NORMAL

## 2018-12-24 PROCEDURE — 99285 EMERGENCY DEPT VISIT HI MDM: CPT

## 2018-12-24 PROCEDURE — 85610 PROTHROMBIN TIME: CPT | Performed by: PHYSICIAN ASSISTANT

## 2018-12-24 PROCEDURE — 70496 CT ANGIOGRAPHY HEAD: CPT

## 2018-12-24 PROCEDURE — 84300 ASSAY OF URINE SODIUM: CPT | Performed by: INTERNAL MEDICINE

## 2018-12-24 PROCEDURE — A9577 INJ MULTIHANCE: HCPCS | Performed by: PSYCHIATRY & NEUROLOGY

## 2018-12-24 PROCEDURE — 84443 ASSAY THYROID STIM HORMONE: CPT | Performed by: PSYCHIATRY & NEUROLOGY

## 2018-12-24 PROCEDURE — 82565 ASSAY OF CREATININE: CPT

## 2018-12-24 PROCEDURE — 86900 BLOOD TYPING SEROLOGIC ABO: CPT | Performed by: PHYSICIAN ASSISTANT

## 2018-12-24 PROCEDURE — 93010 ELECTROCARDIOGRAM REPORT: CPT | Performed by: INTERNAL MEDICINE

## 2018-12-24 PROCEDURE — 84295 ASSAY OF SERUM SODIUM: CPT | Performed by: PHYSICIAN ASSISTANT

## 2018-12-24 PROCEDURE — 82607 VITAMIN B-12: CPT | Performed by: PSYCHIATRY & NEUROLOGY

## 2018-12-24 PROCEDURE — 82533 TOTAL CORTISOL: CPT | Performed by: INTERNAL MEDICINE

## 2018-12-24 PROCEDURE — 93005 ELECTROCARDIOGRAM TRACING: CPT | Performed by: PHYSICIAN ASSISTANT

## 2018-12-24 PROCEDURE — 0 IOPAMIDOL PER 1 ML: Performed by: PHYSICIAN ASSISTANT

## 2018-12-24 PROCEDURE — 86850 RBC ANTIBODY SCREEN: CPT | Performed by: PHYSICIAN ASSISTANT

## 2018-12-24 PROCEDURE — 0 GADOBENATE DIMEGLUMINE 529 MG/ML SOLUTION: Performed by: PSYCHIATRY & NEUROLOGY

## 2018-12-24 PROCEDURE — 83930 ASSAY OF BLOOD OSMOLALITY: CPT | Performed by: PHYSICIAN ASSISTANT

## 2018-12-24 PROCEDURE — 70498 CT ANGIOGRAPHY NECK: CPT

## 2018-12-24 PROCEDURE — 80053 COMPREHEN METABOLIC PANEL: CPT | Performed by: PHYSICIAN ASSISTANT

## 2018-12-24 PROCEDURE — 71045 X-RAY EXAM CHEST 1 VIEW: CPT

## 2018-12-24 PROCEDURE — 85007 BL SMEAR W/DIFF WBC COUNT: CPT | Performed by: PHYSICIAN ASSISTANT

## 2018-12-24 PROCEDURE — 0042T HC CT CEREBRAL PERFUSION W/WO CONTRAST: CPT

## 2018-12-24 PROCEDURE — 25010000003 POTASSIUM CHLORIDE 10 MEQ/100ML SOLUTION: Performed by: PHYSICIAN ASSISTANT

## 2018-12-24 PROCEDURE — 70553 MRI BRAIN STEM W/O & W/DYE: CPT

## 2018-12-24 PROCEDURE — 86901 BLOOD TYPING SEROLOGIC RH(D): CPT | Performed by: PHYSICIAN ASSISTANT

## 2018-12-24 PROCEDURE — 81003 URINALYSIS AUTO W/O SCOPE: CPT | Performed by: PHYSICIAN ASSISTANT

## 2018-12-24 PROCEDURE — 99221 1ST HOSP IP/OBS SF/LOW 40: CPT | Performed by: PSYCHIATRY & NEUROLOGY

## 2018-12-24 PROCEDURE — 36415 COLL VENOUS BLD VENIPUNCTURE: CPT | Performed by: PHYSICIAN ASSISTANT

## 2018-12-24 PROCEDURE — 85025 COMPLETE CBC W/AUTO DIFF WBC: CPT | Performed by: PHYSICIAN ASSISTANT

## 2018-12-24 PROCEDURE — 84484 ASSAY OF TROPONIN QUANT: CPT | Performed by: PHYSICIAN ASSISTANT

## 2018-12-24 RX ORDER — SODIUM CHLORIDE 9 MG/ML
100 INJECTION, SOLUTION INTRAVENOUS CONTINUOUS
Status: DISCONTINUED | OUTPATIENT
Start: 2018-12-24 | End: 2018-12-26

## 2018-12-24 RX ORDER — DIAPER,BRIEF,INFANT-TODD,DISP
1 EACH MISCELLANEOUS 3 TIMES DAILY PRN
COMMUNITY

## 2018-12-24 RX ORDER — POTASSIUM CHLORIDE 7.45 MG/ML
10 INJECTION INTRAVENOUS ONCE
Status: COMPLETED | OUTPATIENT
Start: 2018-12-24 | End: 2018-12-25

## 2018-12-24 RX ORDER — LORATADINE 10 MG/1
10 TABLET ORAL DAILY PRN
COMMUNITY

## 2018-12-24 RX ORDER — POTASSIUM CHLORIDE 7.45 MG/ML
10 INJECTION INTRAVENOUS ONCE
Status: DISCONTINUED | OUTPATIENT
Start: 2018-12-24 | End: 2019-01-06 | Stop reason: HOSPADM

## 2018-12-24 RX ORDER — ASPIRIN 325 MG
325 TABLET ORAL DAILY
Status: DISCONTINUED | OUTPATIENT
Start: 2018-12-24 | End: 2019-01-06 | Stop reason: HOSPADM

## 2018-12-24 RX ORDER — HYDROXYZINE HYDROCHLORIDE 25 MG/1
25 TABLET, FILM COATED ORAL 3 TIMES DAILY PRN
Status: DISCONTINUED | OUTPATIENT
Start: 2018-12-24 | End: 2018-12-27

## 2018-12-24 RX ORDER — DOXEPIN HYDROCHLORIDE 10 MG/1
10 CAPSULE ORAL NIGHTLY
Status: DISCONTINUED | OUTPATIENT
Start: 2018-12-24 | End: 2018-12-25

## 2018-12-24 RX ORDER — SODIUM CHLORIDE 0.9 % (FLUSH) 0.9 %
3-10 SYRINGE (ML) INJECTION AS NEEDED
Status: DISCONTINUED | OUTPATIENT
Start: 2018-12-24 | End: 2019-01-06 | Stop reason: HOSPADM

## 2018-12-24 RX ORDER — SODIUM CHLORIDE 0.9 % (FLUSH) 0.9 %
3 SYRINGE (ML) INJECTION EVERY 12 HOURS SCHEDULED
Status: DISCONTINUED | OUTPATIENT
Start: 2018-12-24 | End: 2019-01-06 | Stop reason: HOSPADM

## 2018-12-24 RX ORDER — 3% SODIUM CHLORIDE 3 G/100ML
50 INJECTION, SOLUTION INTRAVENOUS ONCE
Status: DISCONTINUED | OUTPATIENT
Start: 2018-12-24 | End: 2018-12-24 | Stop reason: SDUPTHER

## 2018-12-24 RX ORDER — 3% SODIUM CHLORIDE 3 G/100ML
50 INJECTION, SOLUTION INTRAVENOUS ONCE
Status: COMPLETED | OUTPATIENT
Start: 2018-12-24 | End: 2018-12-24

## 2018-12-24 RX ORDER — ASPIRIN 300 MG/1
300 SUPPOSITORY RECTAL DAILY
Status: DISCONTINUED | OUTPATIENT
Start: 2018-12-24 | End: 2019-01-06 | Stop reason: HOSPADM

## 2018-12-24 RX ORDER — ATORVASTATIN CALCIUM 80 MG/1
80 TABLET, FILM COATED ORAL NIGHTLY
Status: DISCONTINUED | OUTPATIENT
Start: 2018-12-24 | End: 2018-12-25

## 2018-12-24 RX ORDER — SODIUM CHLORIDE 0.9 % (FLUSH) 0.9 %
10 SYRINGE (ML) INJECTION AS NEEDED
Status: DISCONTINUED | OUTPATIENT
Start: 2018-12-24 | End: 2019-01-06 | Stop reason: HOSPADM

## 2018-12-24 RX ORDER — DIAPER,BRIEF,INFANT-TODD,DISP
1 EACH MISCELLANEOUS 3 TIMES DAILY PRN
Status: DISCONTINUED | OUTPATIENT
Start: 2018-12-24 | End: 2019-01-06 | Stop reason: HOSPADM

## 2018-12-24 RX ADMIN — SODIUM CHLORIDE 250 ML/HR: 9 INJECTION, SOLUTION INTRAVENOUS at 23:03

## 2018-12-24 RX ADMIN — GADOBENATE DIMEGLUMINE 8 ML: 529 INJECTION, SOLUTION INTRAVENOUS at 18:54

## 2018-12-24 RX ADMIN — IOPAMIDOL 150 ML: 755 INJECTION, SOLUTION INTRAVENOUS at 16:44

## 2018-12-24 RX ADMIN — ASPIRIN 300 MG: 300 SUPPOSITORY RECTAL at 16:57

## 2018-12-24 RX ADMIN — POTASSIUM CHLORIDE 10 MEQ: 7.46 INJECTION, SOLUTION INTRAVENOUS at 23:04

## 2018-12-24 RX ADMIN — SODIUM CHLORIDE 50 ML/HR: 3 INJECTION, SOLUTION INTRAVENOUS at 19:37

## 2018-12-25 PROBLEM — R33.9 URINE RETENTION: Status: ACTIVE | Noted: 2018-12-25

## 2018-12-25 LAB
BILIRUB UR QL STRIP: NEGATIVE
CHOLEST SERPL-MCNC: 141 MG/DL (ref 0–200)
CLARITY UR: CLEAR
COLOR UR: YELLOW
GLUCOSE BLDC GLUCOMTR-MCNC: 80 MG/DL (ref 70–130)
GLUCOSE UR STRIP-MCNC: NEGATIVE MG/DL
HBA1C MFR BLD: 4.9 % (ref 4.8–5.6)
HDLC SERPL-MCNC: 57 MG/DL (ref 40–60)
HGB UR QL STRIP.AUTO: NEGATIVE
KETONES UR QL STRIP: ABNORMAL
LDLC SERPL CALC-MCNC: 72 MG/DL (ref 0–100)
LDLC/HDLC SERPL: 1.26 {RATIO}
LEUKOCYTE ESTERASE UR QL STRIP.AUTO: NEGATIVE
NITRITE UR QL STRIP: NEGATIVE
OSMOLALITY UR: 485 MOSM/KG
PH UR STRIP.AUTO: 6 [PH] (ref 5–8)
POTASSIUM BLD-SCNC: 3.1 MMOL/L (ref 3.5–5.2)
PROT UR QL STRIP: NEGATIVE
SODIUM BLD-SCNC: 128 MMOL/L (ref 136–145)
SODIUM BLD-SCNC: 129 MMOL/L (ref 136–145)
SODIUM BLD-SCNC: 129 MMOL/L (ref 136–145)
SODIUM BLD-SCNC: 130 MMOL/L (ref 136–145)
SP GR UR STRIP: >=1.03 (ref 1–1.03)
TRIGL SERPL-MCNC: 61 MG/DL (ref 0–150)
UROBILINOGEN UR QL STRIP: ABNORMAL
VLDLC SERPL-MCNC: 12.2 MG/DL (ref 5–40)

## 2018-12-25 PROCEDURE — 84132 ASSAY OF SERUM POTASSIUM: CPT | Performed by: HOSPITALIST

## 2018-12-25 PROCEDURE — 82962 GLUCOSE BLOOD TEST: CPT

## 2018-12-25 PROCEDURE — 83935 ASSAY OF URINE OSMOLALITY: CPT | Performed by: INTERNAL MEDICINE

## 2018-12-25 PROCEDURE — 25010000002 LORAZEPAM PER 2 MG: Performed by: HOSPITALIST

## 2018-12-25 PROCEDURE — 81003 URINALYSIS AUTO W/O SCOPE: CPT | Performed by: PSYCHIATRY & NEUROLOGY

## 2018-12-25 PROCEDURE — 80061 LIPID PANEL: CPT | Performed by: PSYCHIATRY & NEUROLOGY

## 2018-12-25 PROCEDURE — 99223 1ST HOSP IP/OBS HIGH 75: CPT | Performed by: INTERNAL MEDICINE

## 2018-12-25 PROCEDURE — 83036 HEMOGLOBIN GLYCOSYLATED A1C: CPT | Performed by: PSYCHIATRY & NEUROLOGY

## 2018-12-25 PROCEDURE — 99231 SBSQ HOSP IP/OBS SF/LOW 25: CPT | Performed by: PSYCHIATRY & NEUROLOGY

## 2018-12-25 PROCEDURE — 84295 ASSAY OF SERUM SODIUM: CPT | Performed by: PHYSICIAN ASSISTANT

## 2018-12-25 PROCEDURE — 25010000003 POTASSIUM CHLORIDE 10 MEQ/100ML SOLUTION: Performed by: PHYSICIAN ASSISTANT

## 2018-12-25 RX ORDER — ATORVASTATIN CALCIUM 20 MG/1
20 TABLET, FILM COATED ORAL NIGHTLY
Status: DISCONTINUED | OUTPATIENT
Start: 2018-12-25 | End: 2019-01-06 | Stop reason: HOSPADM

## 2018-12-25 RX ORDER — POTASSIUM CHLORIDE 750 MG/1
40 CAPSULE, EXTENDED RELEASE ORAL AS NEEDED
Status: DISCONTINUED | OUTPATIENT
Start: 2018-12-25 | End: 2019-01-06 | Stop reason: HOSPADM

## 2018-12-25 RX ORDER — LORAZEPAM 2 MG/ML
0.5 INJECTION INTRAMUSCULAR ONCE
Status: COMPLETED | OUTPATIENT
Start: 2018-12-25 | End: 2018-12-25

## 2018-12-25 RX ORDER — POTASSIUM CHLORIDE 1.5 G/1.77G
40 POWDER, FOR SOLUTION ORAL AS NEEDED
Status: DISCONTINUED | OUTPATIENT
Start: 2018-12-25 | End: 2019-01-06 | Stop reason: HOSPADM

## 2018-12-25 RX ADMIN — ASPIRIN 325 MG: 325 TABLET ORAL at 10:20

## 2018-12-25 RX ADMIN — SODIUM CHLORIDE 250 ML/HR: 9 INJECTION, SOLUTION INTRAVENOUS at 11:12

## 2018-12-25 RX ADMIN — SODIUM CHLORIDE 250 ML/HR: 9 INJECTION, SOLUTION INTRAVENOUS at 03:03

## 2018-12-25 RX ADMIN — SODIUM CHLORIDE, PRESERVATIVE FREE 3 ML: 5 INJECTION INTRAVENOUS at 20:08

## 2018-12-25 RX ADMIN — ATORVASTATIN CALCIUM 20 MG: 20 TABLET, FILM COATED ORAL at 20:07

## 2018-12-25 RX ADMIN — SODIUM CHLORIDE 250 ML/HR: 9 INJECTION, SOLUTION INTRAVENOUS at 07:06

## 2018-12-25 RX ADMIN — LORAZEPAM 0.5 MG: 2 INJECTION, SOLUTION INTRAMUSCULAR; INTRAVENOUS at 21:42

## 2018-12-25 RX ADMIN — POTASSIUM CHLORIDE 40 MEQ: 750 CAPSULE, EXTENDED RELEASE ORAL at 20:07

## 2018-12-25 RX ADMIN — POTASSIUM CHLORIDE 10 MEQ: 7.46 INJECTION, SOLUTION INTRAVENOUS at 00:19

## 2018-12-25 RX ADMIN — POTASSIUM CHLORIDE 10 MEQ: 7.46 INJECTION, SOLUTION INTRAVENOUS at 03:26

## 2018-12-25 RX ADMIN — SODIUM CHLORIDE 100 ML/HR: 9 INJECTION, SOLUTION INTRAVENOUS at 15:20

## 2018-12-25 RX ADMIN — POTASSIUM CHLORIDE 40 MEQ: 750 CAPSULE, EXTENDED RELEASE ORAL at 16:03

## 2018-12-25 RX ADMIN — SODIUM CHLORIDE, PRESERVATIVE FREE 3 ML: 5 INJECTION INTRAVENOUS at 10:21

## 2018-12-25 RX ADMIN — POTASSIUM CHLORIDE 10 MEQ: 7.46 INJECTION, SOLUTION INTRAVENOUS at 04:21

## 2018-12-25 RX ADMIN — POTASSIUM CHLORIDE 10 MEQ: 7.46 INJECTION, SOLUTION INTRAVENOUS at 02:27

## 2018-12-25 RX ADMIN — POTASSIUM CHLORIDE 10 MEQ: 7.46 INJECTION, SOLUTION INTRAVENOUS at 01:30

## 2018-12-26 ENCOUNTER — APPOINTMENT (OUTPATIENT)
Dept: CARDIOLOGY | Facility: HOSPITAL | Age: 63
End: 2018-12-26
Attending: INTERNAL MEDICINE

## 2018-12-26 LAB
ANION GAP SERPL CALCULATED.3IONS-SCNC: 11.2 MMOL/L
BASOPHILS # BLD AUTO: 0.02 10*3/MM3 (ref 0–0.2)
BASOPHILS NFR BLD AUTO: 0.2 % (ref 0–1.5)
BH CV ECHO MEAS - ACS: 1.5 CM
BH CV ECHO MEAS - AO MAX PG (FULL): 1.5 MMHG
BH CV ECHO MEAS - AO MAX PG: 7.3 MMHG
BH CV ECHO MEAS - AO MEAN PG (FULL): 1 MMHG
BH CV ECHO MEAS - AO MEAN PG: 3 MMHG
BH CV ECHO MEAS - AO ROOT AREA (BSA CORRECTED): 1.5
BH CV ECHO MEAS - AO ROOT AREA: 3.5 CM^2
BH CV ECHO MEAS - AO ROOT DIAM: 2.1 CM
BH CV ECHO MEAS - AO V2 MAX: 135 CM/SEC
BH CV ECHO MEAS - AO V2 MEAN: 80.2 CM/SEC
BH CV ECHO MEAS - AO V2 VTI: 29 CM
BH CV ECHO MEAS - AVA(I,A): 1.8 CM^2
BH CV ECHO MEAS - AVA(I,D): 1.8 CM^2
BH CV ECHO MEAS - AVA(V,A): 2 CM^2
BH CV ECHO MEAS - AVA(V,D): 2 CM^2
BH CV ECHO MEAS - BSA(HAYCOCK): 1.3 M^2
BH CV ECHO MEAS - BSA: 1.4 M^2
BH CV ECHO MEAS - BZI_BMI: 15.4 KILOGRAMS/M^2
BH CV ECHO MEAS - BZI_METRIC_HEIGHT: 162.6 CM
BH CV ECHO MEAS - BZI_METRIC_WEIGHT: 40.8 KG
BH CV ECHO MEAS - EDV(CUBED): 35.9 ML
BH CV ECHO MEAS - EDV(MOD-SP2): 37 ML
BH CV ECHO MEAS - EDV(MOD-SP4): 33 ML
BH CV ECHO MEAS - EDV(TEICH): 44.1 ML
BH CV ECHO MEAS - EF(CUBED): 83.8 %
BH CV ECHO MEAS - EF(MOD-BP): 64 %
BH CV ECHO MEAS - EF(MOD-SP2): 73 %
BH CV ECHO MEAS - EF(MOD-SP4): 48.5 %
BH CV ECHO MEAS - EF(TEICH): 78 %
BH CV ECHO MEAS - ESV(CUBED): 5.8 ML
BH CV ECHO MEAS - ESV(MOD-SP2): 10 ML
BH CV ECHO MEAS - ESV(MOD-SP4): 17 ML
BH CV ECHO MEAS - ESV(TEICH): 9.7 ML
BH CV ECHO MEAS - FS: 45.5 %
BH CV ECHO MEAS - IVS/LVPW: 1.1
BH CV ECHO MEAS - IVSD: 0.8 CM
BH CV ECHO MEAS - LAT PEAK E' VEL: 9 CM/SEC
BH CV ECHO MEAS - LV DIASTOLIC VOL/BSA (35-75): 23.7 ML/M^2
BH CV ECHO MEAS - LV MASS(C)D: 62.7 GRAMS
BH CV ECHO MEAS - LV MASS(C)DI: 45 GRAMS/M^2
BH CV ECHO MEAS - LV MAX PG: 5.8 MMHG
BH CV ECHO MEAS - LV MEAN PG: 2 MMHG
BH CV ECHO MEAS - LV SYSTOLIC VOL/BSA (12-30): 12.2 ML/M^2
BH CV ECHO MEAS - LV V1 MAX: 120 CM/SEC
BH CV ECHO MEAS - LV V1 MEAN: 67.4 CM/SEC
BH CV ECHO MEAS - LV V1 VTI: 22.5 CM
BH CV ECHO MEAS - LVIDD: 3.3 CM
BH CV ECHO MEAS - LVIDS: 1.8 CM
BH CV ECHO MEAS - LVLD AP2: 5.8 CM
BH CV ECHO MEAS - LVLD AP4: 4.9 CM
BH CV ECHO MEAS - LVLS AP2: 4.3 CM
BH CV ECHO MEAS - LVLS AP4: 3.9 CM
BH CV ECHO MEAS - LVOT AREA (M): 2.3 CM^2
BH CV ECHO MEAS - LVOT AREA: 2.3 CM^2
BH CV ECHO MEAS - LVOT DIAM: 1.7 CM
BH CV ECHO MEAS - LVPWD: 0.7 CM
BH CV ECHO MEAS - MED PEAK E' VEL: 8 CM/SEC
BH CV ECHO MEAS - MV A DUR: 0.09 SEC
BH CV ECHO MEAS - MV A MAX VEL: 62.1 CM/SEC
BH CV ECHO MEAS - MV DEC SLOPE: 617 CM/SEC^2
BH CV ECHO MEAS - MV DEC TIME: 0.24 SEC
BH CV ECHO MEAS - MV E MAX VEL: 71.3 CM/SEC
BH CV ECHO MEAS - MV E/A: 1.1
BH CV ECHO MEAS - MV MAX PG: 4.2 MMHG
BH CV ECHO MEAS - MV MEAN PG: 2 MMHG
BH CV ECHO MEAS - MV P1/2T MAX VEL: 102 CM/SEC
BH CV ECHO MEAS - MV P1/2T: 48.4 MSEC
BH CV ECHO MEAS - MV V2 MAX: 102 CM/SEC
BH CV ECHO MEAS - MV V2 MEAN: 54.9 CM/SEC
BH CV ECHO MEAS - MV V2 VTI: 27.2 CM
BH CV ECHO MEAS - MVA P1/2T LCG: 2.2 CM^2
BH CV ECHO MEAS - MVA(P1/2T): 4.5 CM^2
BH CV ECHO MEAS - MVA(VTI): 1.9 CM^2
BH CV ECHO MEAS - PA ACC TIME: 0.15 SEC
BH CV ECHO MEAS - PA MAX PG (FULL): 0.88 MMHG
BH CV ECHO MEAS - PA MAX PG: 2.7 MMHG
BH CV ECHO MEAS - PA PR(ACCEL): 11.1 MMHG
BH CV ECHO MEAS - PA V2 MAX: 81.4 CM/SEC
BH CV ECHO MEAS - PULM A REVS DUR: 0.11 SEC
BH CV ECHO MEAS - PULM A REVS VEL: 32 CM/SEC
BH CV ECHO MEAS - PULM DIAS VEL: 40.3 CM/SEC
BH CV ECHO MEAS - PULM S/D: 1.2
BH CV ECHO MEAS - PULM SYS VEL: 50 CM/SEC
BH CV ECHO MEAS - PVA(V,A): 2.6 CM^2
BH CV ECHO MEAS - PVA(V,D): 2.6 CM^2
BH CV ECHO MEAS - QP/QS: 0.7
BH CV ECHO MEAS - RAP SYSTOLE: 3 MMHG
BH CV ECHO MEAS - RV MAX PG: 1.8 MMHG
BH CV ECHO MEAS - RV MEAN PG: 1 MMHG
BH CV ECHO MEAS - RV V1 MAX: 66.5 CM/SEC
BH CV ECHO MEAS - RV V1 MEAN: 36.4 CM/SEC
BH CV ECHO MEAS - RV V1 VTI: 11.3 CM
BH CV ECHO MEAS - RVOT AREA: 3.1 CM^2
BH CV ECHO MEAS - RVOT DIAM: 2 CM
BH CV ECHO MEAS - RVSP: 25 MMHG
BH CV ECHO MEAS - SI(AO): 72.1 ML/M^2
BH CV ECHO MEAS - SI(CUBED): 21.6 ML/M^2
BH CV ECHO MEAS - SI(LVOT): 36.7 ML/M^2
BH CV ECHO MEAS - SI(MOD-SP2): 19.4 ML/M^2
BH CV ECHO MEAS - SI(MOD-SP4): 11.5 ML/M^2
BH CV ECHO MEAS - SI(TEICH): 24.7 ML/M^2
BH CV ECHO MEAS - SV(AO): 100.4 ML
BH CV ECHO MEAS - SV(CUBED): 30.1 ML
BH CV ECHO MEAS - SV(LVOT): 51.1 ML
BH CV ECHO MEAS - SV(MOD-SP2): 27 ML
BH CV ECHO MEAS - SV(MOD-SP4): 16 ML
BH CV ECHO MEAS - SV(RVOT): 35.5 ML
BH CV ECHO MEAS - SV(TEICH): 34.4 ML
BH CV ECHO MEAS - TAPSE (>1.6): 1.7 CM2
BH CV ECHO MEAS - TR MAX VEL: 250 CM/SEC
BH CV ECHO MEASUREMENTS AVERAGE E/E' RATIO: 8.39
BH CV VAS BP RIGHT ARM: NORMAL MMHG
BH CV XLRA - RV BASE: 2.7 CM
BH CV XLRA - TDI S': 13 CM/SEC
BUN BLD-MCNC: 3 MG/DL (ref 8–23)
BUN/CREAT SERPL: 11.5 (ref 7–25)
CALCIUM SPEC-SCNC: 8.8 MG/DL (ref 8.6–10.5)
CHLORIDE SERPL-SCNC: 86 MMOL/L (ref 98–107)
CO2 SERPL-SCNC: 24.8 MMOL/L (ref 22–29)
CREAT BLD-MCNC: 0.26 MG/DL (ref 0.57–1)
DEPRECATED RDW RBC AUTO: 40.6 FL (ref 37–54)
EOSINOPHIL # BLD AUTO: 0.06 10*3/MM3 (ref 0–0.7)
EOSINOPHIL NFR BLD AUTO: 0.7 % (ref 0.3–6.2)
ERYTHROCYTE [DISTWIDTH] IN BLOOD BY AUTOMATED COUNT: 11.8 % (ref 11.7–13)
GFR SERPL CREATININE-BSD FRML MDRD: >150 ML/MIN/1.73
GLUCOSE BLD-MCNC: 86 MG/DL (ref 65–99)
HCT VFR BLD AUTO: 34.5 % (ref 35.6–45.5)
HGB BLD-MCNC: 12.4 G/DL (ref 11.9–15.5)
IMM GRANULOCYTES # BLD AUTO: 0.02 10*3/MM3 (ref 0–0.03)
IMM GRANULOCYTES NFR BLD AUTO: 0.2 % (ref 0–0.5)
LEFT ATRIUM VOLUME INDEX: 10 ML/M2
LV EF 2D ECHO EST: 64 %
LYMPHOCYTES # BLD AUTO: 1.51 10*3/MM3 (ref 0.9–4.8)
LYMPHOCYTES NFR BLD AUTO: 17 % (ref 19.6–45.3)
MAXIMAL PREDICTED HEART RATE: 155 BPM
MCH RBC QN AUTO: 34.1 PG (ref 26.9–32)
MCHC RBC AUTO-ENTMCNC: 35.9 G/DL (ref 32.4–36.3)
MCV RBC AUTO: 94.8 FL (ref 80.5–98.2)
MONOCYTES # BLD AUTO: 0.28 10*3/MM3 (ref 0.2–1.2)
MONOCYTES NFR BLD AUTO: 3.2 % (ref 5–12)
NEUTROPHILS # BLD AUTO: 6.98 10*3/MM3 (ref 1.9–8.1)
NEUTROPHILS NFR BLD AUTO: 78.7 % (ref 42.7–76)
OSMOLALITY UR: 413 MOSM/KG
PLATELET # BLD AUTO: 258 10*3/MM3 (ref 140–500)
PMV BLD AUTO: 9.1 FL (ref 6–12)
POTASSIUM BLD-SCNC: 3.5 MMOL/L (ref 3.5–5.2)
RBC # BLD AUTO: 3.64 10*6/MM3 (ref 3.9–5.2)
SODIUM BLD-SCNC: 120 MMOL/L (ref 136–145)
SODIUM BLD-SCNC: 122 MMOL/L (ref 136–145)
SODIUM BLD-SCNC: 122 MMOL/L (ref 136–145)
SODIUM BLD-SCNC: 130 MMOL/L (ref 136–145)
SODIUM UR-SCNC: 174 MMOL/L
STRESS TARGET HR: 132 BPM
WBC NRBC COR # BLD: 8.87 10*3/MM3 (ref 4.5–10.7)

## 2018-12-26 PROCEDURE — 85025 COMPLETE CBC W/AUTO DIFF WBC: CPT | Performed by: HOSPITALIST

## 2018-12-26 PROCEDURE — 80048 BASIC METABOLIC PNL TOTAL CA: CPT | Performed by: HOSPITALIST

## 2018-12-26 PROCEDURE — 97162 PT EVAL MOD COMPLEX 30 MIN: CPT

## 2018-12-26 PROCEDURE — 84295 ASSAY OF SERUM SODIUM: CPT | Performed by: PHYSICIAN ASSISTANT

## 2018-12-26 PROCEDURE — 93306 TTE W/DOPPLER COMPLETE: CPT | Performed by: INTERNAL MEDICINE

## 2018-12-26 PROCEDURE — 25010000003 POTASSIUM CHLORIDE 10 MEQ/100ML SOLUTION: Performed by: HOSPITALIST

## 2018-12-26 PROCEDURE — 82024 ASSAY OF ACTH: CPT | Performed by: INTERNAL MEDICINE

## 2018-12-26 PROCEDURE — 93306 TTE W/DOPPLER COMPLETE: CPT

## 2018-12-26 PROCEDURE — 83935 ASSAY OF URINE OSMOLALITY: CPT | Performed by: INTERNAL MEDICINE

## 2018-12-26 PROCEDURE — 92610 EVALUATE SWALLOWING FUNCTION: CPT | Performed by: SPEECH-LANGUAGE PATHOLOGIST

## 2018-12-26 PROCEDURE — 99232 SBSQ HOSP IP/OBS MODERATE 35: CPT | Performed by: INTERNAL MEDICINE

## 2018-12-26 PROCEDURE — 84300 ASSAY OF URINE SODIUM: CPT | Performed by: INTERNAL MEDICINE

## 2018-12-26 PROCEDURE — 97110 THERAPEUTIC EXERCISES: CPT

## 2018-12-26 RX ORDER — POTASSIUM CHLORIDE 750 MG/1
40 CAPSULE, EXTENDED RELEASE ORAL ONCE
Status: COMPLETED | OUTPATIENT
Start: 2018-12-26 | End: 2018-12-26

## 2018-12-26 RX ORDER — SODIUM CHLORIDE 1000 MG
2 TABLET, SOLUBLE MISCELLANEOUS
Status: DISCONTINUED | OUTPATIENT
Start: 2018-12-26 | End: 2018-12-27

## 2018-12-26 RX ORDER — POTASSIUM CHLORIDE 7.45 MG/ML
10 INJECTION INTRAVENOUS
Status: DISCONTINUED | OUTPATIENT
Start: 2018-12-26 | End: 2019-01-06 | Stop reason: HOSPADM

## 2018-12-26 RX ORDER — FUROSEMIDE 20 MG/1
20 TABLET ORAL
Status: DISCONTINUED | OUTPATIENT
Start: 2018-12-26 | End: 2019-01-04

## 2018-12-26 RX ADMIN — SODIUM CHLORIDE, PRESERVATIVE FREE 3 ML: 5 INJECTION INTRAVENOUS at 08:52

## 2018-12-26 RX ADMIN — POTASSIUM CHLORIDE 10 MEQ: 7.46 INJECTION, SOLUTION INTRAVENOUS at 00:54

## 2018-12-26 RX ADMIN — ATORVASTATIN CALCIUM 20 MG: 20 TABLET, FILM COATED ORAL at 21:01

## 2018-12-26 RX ADMIN — ASPIRIN 325 MG: 325 TABLET ORAL at 08:51

## 2018-12-26 RX ADMIN — SODIUM CHLORIDE 100 ML/HR: 9 INJECTION, SOLUTION INTRAVENOUS at 03:30

## 2018-12-26 RX ADMIN — FUROSEMIDE 20 MG: 20 TABLET ORAL at 18:32

## 2018-12-26 RX ADMIN — FUROSEMIDE 20 MG: 20 TABLET ORAL at 13:37

## 2018-12-26 RX ADMIN — POTASSIUM CHLORIDE 40 MEQ: 750 CAPSULE, EXTENDED RELEASE ORAL at 13:37

## 2018-12-26 RX ADMIN — SODIUM CHLORIDE, PRESERVATIVE FREE 3 ML: 5 INJECTION INTRAVENOUS at 21:01

## 2018-12-26 RX ADMIN — POTASSIUM CHLORIDE 10 MEQ: 7.46 INJECTION, SOLUTION INTRAVENOUS at 01:59

## 2018-12-27 LAB
ACTH PLAS-MCNC: 45.2 PG/ML (ref 7.2–63.3)
ANION GAP SERPL CALCULATED.3IONS-SCNC: 11.9 MMOL/L
BASOPHILS # BLD AUTO: 0.01 10*3/MM3 (ref 0–0.2)
BASOPHILS NFR BLD AUTO: 0.1 % (ref 0–1.5)
BUN BLD-MCNC: 6 MG/DL (ref 8–23)
BUN/CREAT SERPL: 17.6 (ref 7–25)
CALCIUM SPEC-SCNC: 9 MG/DL (ref 8.6–10.5)
CHLORIDE SERPL-SCNC: 80 MMOL/L (ref 98–107)
CO2 SERPL-SCNC: 28.1 MMOL/L (ref 22–29)
CREAT BLD-MCNC: 0.34 MG/DL (ref 0.57–1)
DEPRECATED RDW RBC AUTO: 38.1 FL (ref 37–54)
EOSINOPHIL # BLD AUTO: 0.07 10*3/MM3 (ref 0–0.7)
EOSINOPHIL NFR BLD AUTO: 1 % (ref 0.3–6.2)
ERYTHROCYTE [DISTWIDTH] IN BLOOD BY AUTOMATED COUNT: 11.7 % (ref 11.7–13)
GFR SERPL CREATININE-BSD FRML MDRD: >150 ML/MIN/1.73
GLUCOSE BLD-MCNC: 92 MG/DL (ref 65–99)
HCT VFR BLD AUTO: 35.4 % (ref 35.6–45.5)
HGB BLD-MCNC: 13.7 G/DL (ref 11.9–15.5)
IMM GRANULOCYTES # BLD AUTO: 0.01 10*3/MM3 (ref 0–0.03)
IMM GRANULOCYTES NFR BLD AUTO: 0.1 % (ref 0–0.5)
LYMPHOCYTES # BLD AUTO: 1.25 10*3/MM3 (ref 0.9–4.8)
LYMPHOCYTES NFR BLD AUTO: 18.5 % (ref 19.6–45.3)
MAGNESIUM SERPL-MCNC: 1.3 MG/DL (ref 1.6–2.4)
MCH RBC QN AUTO: 34.4 PG (ref 26.9–32)
MCHC RBC AUTO-ENTMCNC: 38.7 G/DL (ref 32.4–36.3)
MCV RBC AUTO: 88.9 FL (ref 80.5–98.2)
MONOCYTES # BLD AUTO: 0.14 10*3/MM3 (ref 0.2–1.2)
MONOCYTES NFR BLD AUTO: 2.1 % (ref 5–12)
NEUTROPHILS # BLD AUTO: 5.28 10*3/MM3 (ref 1.9–8.1)
NEUTROPHILS NFR BLD AUTO: 78.3 % (ref 42.7–76)
NRBC BLD AUTO-RTO: 0 /100 WBC (ref 0–0)
PLAT MORPH BLD: NORMAL
PLATELET # BLD AUTO: 265 10*3/MM3 (ref 140–500)
PMV BLD AUTO: 9.6 FL (ref 6–12)
POTASSIUM BLD-SCNC: 3.3 MMOL/L (ref 3.5–5.2)
POTASSIUM BLD-SCNC: 4 MMOL/L (ref 3.5–5.2)
RBC # BLD AUTO: 3.98 10*6/MM3 (ref 3.9–5.2)
RBC MORPH BLD: NORMAL
SODIUM BLD-SCNC: 120 MMOL/L (ref 136–145)
SODIUM BLD-SCNC: 123 MMOL/L (ref 136–145)
SODIUM BLD-SCNC: 123 MMOL/L (ref 136–145)
WBC MORPH BLD: NORMAL
WBC NRBC COR # BLD: 6.75 10*3/MM3 (ref 4.5–10.7)

## 2018-12-27 PROCEDURE — 97110 THERAPEUTIC EXERCISES: CPT

## 2018-12-27 PROCEDURE — 83735 ASSAY OF MAGNESIUM: CPT | Performed by: HOSPITALIST

## 2018-12-27 PROCEDURE — 80048 BASIC METABOLIC PNL TOTAL CA: CPT | Performed by: HOSPITALIST

## 2018-12-27 PROCEDURE — 99232 SBSQ HOSP IP/OBS MODERATE 35: CPT | Performed by: INTERNAL MEDICINE

## 2018-12-27 PROCEDURE — 85007 BL SMEAR W/DIFF WBC COUNT: CPT | Performed by: HOSPITALIST

## 2018-12-27 PROCEDURE — 85025 COMPLETE CBC W/AUTO DIFF WBC: CPT | Performed by: HOSPITALIST

## 2018-12-27 PROCEDURE — 84132 ASSAY OF SERUM POTASSIUM: CPT | Performed by: HOSPITALIST

## 2018-12-27 PROCEDURE — 97165 OT EVAL LOW COMPLEX 30 MIN: CPT

## 2018-12-27 PROCEDURE — 84295 ASSAY OF SERUM SODIUM: CPT | Performed by: PHYSICIAN ASSISTANT

## 2018-12-27 RX ORDER — SODIUM CHLORIDE 1000 MG
3 TABLET, SOLUBLE MISCELLANEOUS ONCE
Status: COMPLETED | OUTPATIENT
Start: 2018-12-27 | End: 2018-12-27

## 2018-12-27 RX ORDER — POTASSIUM CHLORIDE 1.5 G/1.77G
20 POWDER, FOR SOLUTION ORAL 2 TIMES DAILY
Status: DISCONTINUED | OUTPATIENT
Start: 2018-12-27 | End: 2019-01-02

## 2018-12-27 RX ORDER — POTASSIUM CHLORIDE 750 MG/1
40 CAPSULE, EXTENDED RELEASE ORAL ONCE
Status: DISCONTINUED | OUTPATIENT
Start: 2018-12-27 | End: 2019-01-06 | Stop reason: HOSPADM

## 2018-12-27 RX ORDER — HYDROXYZINE PAMOATE 25 MG/1
25 CAPSULE ORAL 3 TIMES DAILY PRN
Status: DISCONTINUED | OUTPATIENT
Start: 2018-12-27 | End: 2019-01-06 | Stop reason: HOSPADM

## 2018-12-27 RX ORDER — ZOLPIDEM TARTRATE 5 MG/1
5 TABLET ORAL NIGHTLY PRN
Status: DISCONTINUED | OUTPATIENT
Start: 2018-12-27 | End: 2019-01-06 | Stop reason: HOSPADM

## 2018-12-27 RX ORDER — SODIUM CHLORIDE 1000 MG
3 TABLET, SOLUBLE MISCELLANEOUS
Status: DISCONTINUED | OUTPATIENT
Start: 2018-12-28 | End: 2018-12-28

## 2018-12-27 RX ORDER — MAGNESIUM SULFATE HEPTAHYDRATE 40 MG/ML
2 INJECTION, SOLUTION INTRAVENOUS AS NEEDED
Status: DISCONTINUED | OUTPATIENT
Start: 2018-12-27 | End: 2019-01-06 | Stop reason: HOSPADM

## 2018-12-27 RX ORDER — NICOTINE 21 MG/24HR
1 PATCH, TRANSDERMAL 24 HOURS TRANSDERMAL
Status: DISCONTINUED | OUTPATIENT
Start: 2018-12-27 | End: 2019-01-06 | Stop reason: HOSPADM

## 2018-12-27 RX ORDER — MAGNESIUM SULFATE HEPTAHYDRATE 40 MG/ML
4 INJECTION, SOLUTION INTRAVENOUS AS NEEDED
Status: DISCONTINUED | OUTPATIENT
Start: 2018-12-27 | End: 2019-01-06 | Stop reason: HOSPADM

## 2018-12-27 RX ADMIN — FUROSEMIDE 20 MG: 20 TABLET ORAL at 18:35

## 2018-12-27 RX ADMIN — Medication 2 G: at 18:33

## 2018-12-27 RX ADMIN — POTASSIUM CHLORIDE 40 MEQ: 1.5 POWDER, FOR SOLUTION ORAL at 12:22

## 2018-12-27 RX ADMIN — Medication 2 G: at 08:57

## 2018-12-27 RX ADMIN — ATORVASTATIN CALCIUM 20 MG: 20 TABLET, FILM COATED ORAL at 21:07

## 2018-12-27 RX ADMIN — Medication 2 G: at 00:08

## 2018-12-27 RX ADMIN — Medication 3 G: at 23:30

## 2018-12-27 RX ADMIN — POTASSIUM CHLORIDE 20 MEQ: 1.5 POWDER, FOR SOLUTION ORAL at 21:07

## 2018-12-27 RX ADMIN — SODIUM CHLORIDE, PRESERVATIVE FREE 3 ML: 5 INJECTION INTRAVENOUS at 08:58

## 2018-12-27 RX ADMIN — ASPIRIN 325 MG: 325 TABLET ORAL at 08:57

## 2018-12-27 RX ADMIN — SODIUM CHLORIDE, PRESERVATIVE FREE 3 ML: 5 INJECTION INTRAVENOUS at 21:33

## 2018-12-27 RX ADMIN — Medication 2 G: at 13:29

## 2018-12-27 RX ADMIN — HYDROXYZINE PAMOATE 25 MG: 25 CAPSULE ORAL at 18:33

## 2018-12-27 RX ADMIN — FUROSEMIDE 20 MG: 20 TABLET ORAL at 08:57

## 2018-12-28 ENCOUNTER — APPOINTMENT (OUTPATIENT)
Dept: ONCOLOGY | Facility: HOSPITAL | Age: 63
End: 2018-12-28

## 2018-12-28 ENCOUNTER — APPOINTMENT (OUTPATIENT)
Dept: ONCOLOGY | Facility: CLINIC | Age: 63
End: 2018-12-28

## 2018-12-28 LAB
ANION GAP SERPL CALCULATED.3IONS-SCNC: 12 MMOL/L
BASOPHILS # BLD AUTO: 0.01 10*3/MM3 (ref 0–0.2)
BASOPHILS NFR BLD AUTO: 0.1 % (ref 0–1.5)
BUN BLD-MCNC: 11 MG/DL (ref 8–23)
BUN/CREAT SERPL: 34.4 (ref 7–25)
CALCIUM SPEC-SCNC: 8.9 MG/DL (ref 8.6–10.5)
CHLORIDE SERPL-SCNC: 87 MMOL/L (ref 98–107)
CO2 SERPL-SCNC: 28 MMOL/L (ref 22–29)
CREAT BLD-MCNC: 0.32 MG/DL (ref 0.57–1)
DEPRECATED RDW RBC AUTO: 41.2 FL (ref 37–54)
EOSINOPHIL # BLD AUTO: 0.04 10*3/MM3 (ref 0–0.7)
EOSINOPHIL NFR BLD AUTO: 0.5 % (ref 0.3–6.2)
ERYTHROCYTE [DISTWIDTH] IN BLOOD BY AUTOMATED COUNT: 11.9 % (ref 11.7–13)
GFR SERPL CREATININE-BSD FRML MDRD: >150 ML/MIN/1.73
GLUCOSE BLD-MCNC: 110 MG/DL (ref 65–99)
HCT VFR BLD AUTO: 36.1 % (ref 35.6–45.5)
HGB BLD-MCNC: 12.8 G/DL (ref 11.9–15.5)
IMM GRANULOCYTES # BLD AUTO: 0 10*3/MM3 (ref 0–0.03)
IMM GRANULOCYTES NFR BLD AUTO: 0 % (ref 0–0.5)
LYMPHOCYTES # BLD AUTO: 1.38 10*3/MM3 (ref 0.9–4.8)
LYMPHOCYTES NFR BLD AUTO: 17.6 % (ref 19.6–45.3)
MAGNESIUM SERPL-MCNC: 3.4 MG/DL (ref 1.6–2.4)
MCH RBC QN AUTO: 34 PG (ref 26.9–32)
MCHC RBC AUTO-ENTMCNC: 35.5 G/DL (ref 32.4–36.3)
MCV RBC AUTO: 95.8 FL (ref 80.5–98.2)
MONOCYTES # BLD AUTO: 0.02 10*3/MM3 (ref 0.2–1.2)
MONOCYTES NFR BLD AUTO: 0.3 % (ref 5–12)
NEUTROPHILS # BLD AUTO: 6.37 10*3/MM3 (ref 1.9–8.1)
NEUTROPHILS NFR BLD AUTO: 81.5 % (ref 42.7–76)
OSMOLALITY UR: 501 MOSM/KG
PLATELET # BLD AUTO: 253 10*3/MM3 (ref 140–500)
PMV BLD AUTO: 8.9 FL (ref 6–12)
POTASSIUM BLD-SCNC: 3.4 MMOL/L (ref 3.5–5.2)
POTASSIUM BLD-SCNC: 4.1 MMOL/L (ref 3.5–5.2)
RBC # BLD AUTO: 3.77 10*6/MM3 (ref 3.9–5.2)
SODIUM BLD-SCNC: 127 MMOL/L (ref 136–145)
SODIUM BLD-SCNC: 129 MMOL/L (ref 136–145)
SODIUM BLD-SCNC: 132 MMOL/L (ref 136–145)
SODIUM BLD-SCNC: 134 MMOL/L (ref 136–145)
SODIUM UR-SCNC: 77 MMOL/L
WBC NRBC COR # BLD: 7.82 10*3/MM3 (ref 4.5–10.7)

## 2018-12-28 PROCEDURE — 85025 COMPLETE CBC W/AUTO DIFF WBC: CPT | Performed by: HOSPITALIST

## 2018-12-28 PROCEDURE — 25010000002 MAGNESIUM SULFATE 2 GM/50ML SOLUTION: Performed by: INTERNAL MEDICINE

## 2018-12-28 PROCEDURE — 36415 COLL VENOUS BLD VENIPUNCTURE: CPT | Performed by: HOSPITALIST

## 2018-12-28 PROCEDURE — 83935 ASSAY OF URINE OSMOLALITY: CPT | Performed by: INTERNAL MEDICINE

## 2018-12-28 PROCEDURE — 84295 ASSAY OF SERUM SODIUM: CPT | Performed by: PHYSICIAN ASSISTANT

## 2018-12-28 PROCEDURE — 83735 ASSAY OF MAGNESIUM: CPT | Performed by: INTERNAL MEDICINE

## 2018-12-28 PROCEDURE — 84300 ASSAY OF URINE SODIUM: CPT | Performed by: INTERNAL MEDICINE

## 2018-12-28 PROCEDURE — 97110 THERAPEUTIC EXERCISES: CPT

## 2018-12-28 PROCEDURE — 99232 SBSQ HOSP IP/OBS MODERATE 35: CPT | Performed by: INTERNAL MEDICINE

## 2018-12-28 PROCEDURE — 84132 ASSAY OF SERUM POTASSIUM: CPT | Performed by: HOSPITALIST

## 2018-12-28 PROCEDURE — 80048 BASIC METABOLIC PNL TOTAL CA: CPT | Performed by: HOSPITALIST

## 2018-12-28 RX ADMIN — MAGNESIUM SULFATE HEPTAHYDRATE 2 G: 40 INJECTION, SOLUTION INTRAVENOUS at 01:50

## 2018-12-28 RX ADMIN — ASPIRIN 325 MG: 325 TABLET ORAL at 09:42

## 2018-12-28 RX ADMIN — SODIUM CHLORIDE, PRESERVATIVE FREE 3 ML: 5 INJECTION INTRAVENOUS at 10:00

## 2018-12-28 RX ADMIN — Medication 3 G: at 09:42

## 2018-12-28 RX ADMIN — SODIUM CHLORIDE, PRESERVATIVE FREE 3 ML: 5 INJECTION INTRAVENOUS at 20:45

## 2018-12-28 RX ADMIN — FUROSEMIDE 20 MG: 20 TABLET ORAL at 09:42

## 2018-12-28 RX ADMIN — POTASSIUM CHLORIDE 40 MEQ: 1.5 POWDER, FOR SOLUTION ORAL at 12:50

## 2018-12-28 RX ADMIN — MAGNESIUM SULFATE HEPTAHYDRATE 2 G: 40 INJECTION, SOLUTION INTRAVENOUS at 05:31

## 2018-12-28 RX ADMIN — POTASSIUM CHLORIDE 20 MEQ: 1.5 POWDER, FOR SOLUTION ORAL at 09:42

## 2018-12-28 RX ADMIN — POTASSIUM CHLORIDE 40 MEQ: 1.5 POWDER, FOR SOLUTION ORAL at 17:29

## 2018-12-28 RX ADMIN — Medication 3 G: at 12:50

## 2018-12-28 RX ADMIN — ATORVASTATIN CALCIUM 20 MG: 20 TABLET, FILM COATED ORAL at 20:40

## 2018-12-28 RX ADMIN — Medication 3 G: at 17:29

## 2018-12-28 RX ADMIN — MAGNESIUM SULFATE HEPTAHYDRATE 2 G: 40 INJECTION, SOLUTION INTRAVENOUS at 03:42

## 2018-12-28 RX ADMIN — FUROSEMIDE 20 MG: 20 TABLET ORAL at 17:29

## 2018-12-29 LAB
ANION GAP SERPL CALCULATED.3IONS-SCNC: 11.2 MMOL/L
BASOPHILS # BLD AUTO: 0.01 10*3/MM3 (ref 0–0.2)
BASOPHILS NFR BLD AUTO: 0.1 % (ref 0–1.5)
BUN BLD-MCNC: 14 MG/DL (ref 8–23)
BUN/CREAT SERPL: 36.8 (ref 7–25)
CALCIUM SPEC-SCNC: 9 MG/DL (ref 8.6–10.5)
CHLORIDE SERPL-SCNC: 94 MMOL/L (ref 98–107)
CO2 SERPL-SCNC: 26.8 MMOL/L (ref 22–29)
CREAT BLD-MCNC: 0.38 MG/DL (ref 0.57–1)
DEPRECATED RDW RBC AUTO: 41.2 FL (ref 37–54)
EOSINOPHIL # BLD AUTO: 0.06 10*3/MM3 (ref 0–0.7)
EOSINOPHIL NFR BLD AUTO: 0.8 % (ref 0.3–6.2)
ERYTHROCYTE [DISTWIDTH] IN BLOOD BY AUTOMATED COUNT: 12.1 % (ref 11.7–13)
GFR SERPL CREATININE-BSD FRML MDRD: >150 ML/MIN/1.73
GLUCOSE BLD-MCNC: 101 MG/DL (ref 65–99)
HCT VFR BLD AUTO: 34 % (ref 35.6–45.5)
HGB BLD-MCNC: 12.3 G/DL (ref 11.9–15.5)
IMM GRANULOCYTES # BLD AUTO: 0.02 10*3/MM3 (ref 0–0.03)
IMM GRANULOCYTES NFR BLD AUTO: 0.3 % (ref 0–0.5)
LYMPHOCYTES # BLD AUTO: 1.27 10*3/MM3 (ref 0.9–4.8)
LYMPHOCYTES NFR BLD AUTO: 16.1 % (ref 19.6–45.3)
MCH RBC QN AUTO: 34 PG (ref 26.9–32)
MCHC RBC AUTO-ENTMCNC: 36.2 G/DL (ref 32.4–36.3)
MCV RBC AUTO: 93.9 FL (ref 80.5–98.2)
MONOCYTES # BLD AUTO: 0.31 10*3/MM3 (ref 0.2–1.2)
MONOCYTES NFR BLD AUTO: 3.9 % (ref 5–12)
NEUTROPHILS # BLD AUTO: 6.23 10*3/MM3 (ref 1.9–8.1)
NEUTROPHILS NFR BLD AUTO: 79.1 % (ref 42.7–76)
OSMOLALITY UR: 547 MOSM/KG
PLATELET # BLD AUTO: 277 10*3/MM3 (ref 140–500)
PMV BLD AUTO: 9.7 FL (ref 6–12)
POTASSIUM BLD-SCNC: 3.3 MMOL/L (ref 3.5–5.2)
POTASSIUM BLD-SCNC: 4.1 MMOL/L (ref 3.5–5.2)
RBC # BLD AUTO: 3.62 10*6/MM3 (ref 3.9–5.2)
SODIUM BLD-SCNC: 132 MMOL/L (ref 136–145)
SODIUM BLD-SCNC: 132 MMOL/L (ref 136–145)
SODIUM UR-SCNC: 85 MMOL/L
WBC NRBC COR # BLD: 7.88 10*3/MM3 (ref 4.5–10.7)

## 2018-12-29 PROCEDURE — 83935 ASSAY OF URINE OSMOLALITY: CPT | Performed by: INTERNAL MEDICINE

## 2018-12-29 PROCEDURE — 80048 BASIC METABOLIC PNL TOTAL CA: CPT | Performed by: HOSPITALIST

## 2018-12-29 PROCEDURE — 84132 ASSAY OF SERUM POTASSIUM: CPT | Performed by: HOSPITALIST

## 2018-12-29 PROCEDURE — 97110 THERAPEUTIC EXERCISES: CPT

## 2018-12-29 PROCEDURE — 99231 SBSQ HOSP IP/OBS SF/LOW 25: CPT | Performed by: INTERNAL MEDICINE

## 2018-12-29 PROCEDURE — 85025 COMPLETE CBC W/AUTO DIFF WBC: CPT | Performed by: HOSPITALIST

## 2018-12-29 PROCEDURE — 84295 ASSAY OF SERUM SODIUM: CPT | Performed by: INTERNAL MEDICINE

## 2018-12-29 PROCEDURE — 84300 ASSAY OF URINE SODIUM: CPT | Performed by: INTERNAL MEDICINE

## 2018-12-29 RX ADMIN — FUROSEMIDE 20 MG: 20 TABLET ORAL at 17:07

## 2018-12-29 RX ADMIN — SODIUM CHLORIDE, PRESERVATIVE FREE 3 ML: 5 INJECTION INTRAVENOUS at 22:29

## 2018-12-29 RX ADMIN — SODIUM CHLORIDE, PRESERVATIVE FREE 3 ML: 5 INJECTION INTRAVENOUS at 08:35

## 2018-12-29 RX ADMIN — POTASSIUM CHLORIDE 40 MEQ: 1.5 POWDER, FOR SOLUTION ORAL at 12:04

## 2018-12-29 RX ADMIN — POTASSIUM CHLORIDE 20 MEQ: 1.5 POWDER, FOR SOLUTION ORAL at 22:29

## 2018-12-29 RX ADMIN — POTASSIUM CHLORIDE 20 MEQ: 1.5 POWDER, FOR SOLUTION ORAL at 08:34

## 2018-12-29 RX ADMIN — FUROSEMIDE 20 MG: 20 TABLET ORAL at 08:34

## 2018-12-29 RX ADMIN — ASPIRIN 325 MG: 325 TABLET ORAL at 08:34

## 2018-12-29 RX ADMIN — ATORVASTATIN CALCIUM 20 MG: 20 TABLET, FILM COATED ORAL at 22:29

## 2018-12-29 RX ADMIN — POTASSIUM CHLORIDE 40 MEQ: 1.5 POWDER, FOR SOLUTION ORAL at 17:07

## 2018-12-30 LAB
ANION GAP SERPL CALCULATED.3IONS-SCNC: 10 MMOL/L
BASOPHILS # BLD AUTO: 0.01 10*3/MM3 (ref 0–0.2)
BASOPHILS NFR BLD AUTO: 0.1 % (ref 0–1.5)
BUN BLD-MCNC: 15 MG/DL (ref 8–23)
BUN/CREAT SERPL: 34.1 (ref 7–25)
CALCIUM SPEC-SCNC: 9.7 MG/DL (ref 8.6–10.5)
CHLORIDE SERPL-SCNC: 94 MMOL/L (ref 98–107)
CO2 SERPL-SCNC: 28 MMOL/L (ref 22–29)
CREAT BLD-MCNC: 0.44 MG/DL (ref 0.57–1)
DEPRECATED RDW RBC AUTO: 44.1 FL (ref 37–54)
EOSINOPHIL # BLD AUTO: 0.04 10*3/MM3 (ref 0–0.7)
EOSINOPHIL NFR BLD AUTO: 0.5 % (ref 0.3–6.2)
ERYTHROCYTE [DISTWIDTH] IN BLOOD BY AUTOMATED COUNT: 12.1 % (ref 11.7–13)
GFR SERPL CREATININE-BSD FRML MDRD: 144 ML/MIN/1.73
GLUCOSE BLD-MCNC: 105 MG/DL (ref 65–99)
HCT VFR BLD AUTO: 37.7 % (ref 35.6–45.5)
HGB BLD-MCNC: 13 G/DL (ref 11.9–15.5)
IMM GRANULOCYTES # BLD AUTO: 0 10*3/MM3 (ref 0–0.03)
IMM GRANULOCYTES NFR BLD AUTO: 0 % (ref 0–0.5)
LYMPHOCYTES # BLD AUTO: 0.72 10*3/MM3 (ref 0.9–4.8)
LYMPHOCYTES NFR BLD AUTO: 9.9 % (ref 19.6–45.3)
MCH RBC QN AUTO: 34.5 PG (ref 26.9–32)
MCHC RBC AUTO-ENTMCNC: 34.5 G/DL (ref 32.4–36.3)
MCV RBC AUTO: 100 FL (ref 80.5–98.2)
MONOCYTES # BLD AUTO: 0.53 10*3/MM3 (ref 0.2–1.2)
MONOCYTES NFR BLD AUTO: 7.3 % (ref 5–12)
NEUTROPHILS # BLD AUTO: 6 10*3/MM3 (ref 1.9–8.1)
NEUTROPHILS NFR BLD AUTO: 82.2 % (ref 42.7–76)
PLATELET # BLD AUTO: 275 10*3/MM3 (ref 140–500)
PMV BLD AUTO: 9.3 FL (ref 6–12)
POTASSIUM BLD-SCNC: 4.3 MMOL/L (ref 3.5–5.2)
RBC # BLD AUTO: 3.77 10*6/MM3 (ref 3.9–5.2)
SODIUM BLD-SCNC: 132 MMOL/L (ref 136–145)
WBC NRBC COR # BLD: 7.3 10*3/MM3 (ref 4.5–10.7)

## 2018-12-30 PROCEDURE — 85025 COMPLETE CBC W/AUTO DIFF WBC: CPT | Performed by: HOSPITALIST

## 2018-12-30 PROCEDURE — 80048 BASIC METABOLIC PNL TOTAL CA: CPT | Performed by: HOSPITALIST

## 2018-12-30 PROCEDURE — 97110 THERAPEUTIC EXERCISES: CPT

## 2018-12-30 RX ADMIN — ASPIRIN 325 MG: 325 TABLET ORAL at 08:36

## 2018-12-30 RX ADMIN — ATORVASTATIN CALCIUM 20 MG: 20 TABLET, FILM COATED ORAL at 20:32

## 2018-12-30 RX ADMIN — POTASSIUM CHLORIDE 20 MEQ: 1.5 POWDER, FOR SOLUTION ORAL at 20:32

## 2018-12-30 RX ADMIN — FUROSEMIDE 20 MG: 20 TABLET ORAL at 08:36

## 2018-12-30 RX ADMIN — SODIUM CHLORIDE, PRESERVATIVE FREE 3 ML: 5 INJECTION INTRAVENOUS at 20:53

## 2018-12-30 RX ADMIN — FUROSEMIDE 20 MG: 20 TABLET ORAL at 18:02

## 2018-12-30 RX ADMIN — POTASSIUM CHLORIDE 20 MEQ: 1.5 POWDER, FOR SOLUTION ORAL at 08:36

## 2018-12-30 RX ADMIN — SODIUM CHLORIDE, PRESERVATIVE FREE 3 ML: 5 INJECTION INTRAVENOUS at 08:01

## 2018-12-31 ENCOUNTER — TELEPHONE (OUTPATIENT)
Dept: ONCOLOGY | Facility: CLINIC | Age: 63
End: 2018-12-31

## 2018-12-31 LAB
ANION GAP SERPL CALCULATED.3IONS-SCNC: 11.8 MMOL/L
BASOPHILS # BLD AUTO: 0.02 10*3/MM3 (ref 0–0.2)
BASOPHILS NFR BLD AUTO: 0.3 % (ref 0–1.5)
BUN BLD-MCNC: 18 MG/DL (ref 8–23)
BUN/CREAT SERPL: 43.9 (ref 7–25)
CALCIUM SPEC-SCNC: 9.8 MG/DL (ref 8.6–10.5)
CHLORIDE SERPL-SCNC: 89 MMOL/L (ref 98–107)
CO2 SERPL-SCNC: 29.2 MMOL/L (ref 22–29)
CREAT BLD-MCNC: 0.41 MG/DL (ref 0.57–1)
DEPRECATED RDW RBC AUTO: 40.1 FL (ref 37–54)
EOSINOPHIL # BLD AUTO: 0.04 10*3/MM3 (ref 0–0.7)
EOSINOPHIL NFR BLD AUTO: 0.5 % (ref 0.3–6.2)
ERYTHROCYTE [DISTWIDTH] IN BLOOD BY AUTOMATED COUNT: 11.9 % (ref 11.7–13)
GFR SERPL CREATININE-BSD FRML MDRD: >150 ML/MIN/1.73
GLUCOSE BLD-MCNC: 92 MG/DL (ref 65–99)
HCT VFR BLD AUTO: 36.1 % (ref 35.6–45.5)
HGB BLD-MCNC: 13.1 G/DL (ref 11.9–15.5)
IMM GRANULOCYTES # BLD AUTO: 0.01 10*3/MM3 (ref 0–0.03)
IMM GRANULOCYTES NFR BLD AUTO: 0.1 % (ref 0–0.5)
LYMPHOCYTES # BLD AUTO: 0.62 10*3/MM3 (ref 0.9–4.8)
LYMPHOCYTES NFR BLD AUTO: 8.1 % (ref 19.6–45.3)
MAGNESIUM SERPL-MCNC: 1.8 MG/DL (ref 1.6–2.4)
MCH RBC QN AUTO: 34.4 PG (ref 26.9–32)
MCHC RBC AUTO-ENTMCNC: 36.3 G/DL (ref 32.4–36.3)
MCV RBC AUTO: 94.8 FL (ref 80.5–98.2)
MONOCYTES # BLD AUTO: 0.79 10*3/MM3 (ref 0.2–1.2)
MONOCYTES NFR BLD AUTO: 10.4 % (ref 5–12)
NEUTROPHILS # BLD AUTO: 6.16 10*3/MM3 (ref 1.9–8.1)
NEUTROPHILS NFR BLD AUTO: 80.7 % (ref 42.7–76)
PLATELET # BLD AUTO: 290 10*3/MM3 (ref 140–500)
PMV BLD AUTO: 9.8 FL (ref 6–12)
POTASSIUM BLD-SCNC: 3.8 MMOL/L (ref 3.5–5.2)
RBC # BLD AUTO: 3.81 10*6/MM3 (ref 3.9–5.2)
SODIUM BLD-SCNC: 130 MMOL/L (ref 136–145)
WBC NRBC COR # BLD: 7.63 10*3/MM3 (ref 4.5–10.7)

## 2018-12-31 PROCEDURE — 80048 BASIC METABOLIC PNL TOTAL CA: CPT | Performed by: HOSPITALIST

## 2018-12-31 PROCEDURE — 85025 COMPLETE CBC W/AUTO DIFF WBC: CPT | Performed by: HOSPITALIST

## 2018-12-31 PROCEDURE — 97110 THERAPEUTIC EXERCISES: CPT

## 2018-12-31 PROCEDURE — 83735 ASSAY OF MAGNESIUM: CPT | Performed by: INTERNAL MEDICINE

## 2018-12-31 RX ADMIN — POTASSIUM CHLORIDE 20 MEQ: 1.5 POWDER, FOR SOLUTION ORAL at 20:14

## 2018-12-31 RX ADMIN — FUROSEMIDE 20 MG: 20 TABLET ORAL at 08:25

## 2018-12-31 RX ADMIN — POTASSIUM CHLORIDE 20 MEQ: 1.5 POWDER, FOR SOLUTION ORAL at 08:25

## 2018-12-31 RX ADMIN — SODIUM CHLORIDE, PRESERVATIVE FREE 3 ML: 5 INJECTION INTRAVENOUS at 08:20

## 2018-12-31 RX ADMIN — FUROSEMIDE 20 MG: 20 TABLET ORAL at 18:36

## 2018-12-31 RX ADMIN — ATORVASTATIN CALCIUM 20 MG: 20 TABLET, FILM COATED ORAL at 20:14

## 2018-12-31 RX ADMIN — ASPIRIN 325 MG: 325 TABLET ORAL at 08:25

## 2018-12-31 NOTE — TELEPHONE ENCOUNTER
----- Message from Jarvis Jasmine MD sent at 12/29/2018  9:43 AM EST -----  NP cbc cmp and opdivo this week

## 2019-01-01 LAB
ANION GAP SERPL CALCULATED.3IONS-SCNC: 13.6 MMOL/L
BASOPHILS # BLD AUTO: 0.01 10*3/MM3 (ref 0–0.2)
BASOPHILS NFR BLD AUTO: 0.1 % (ref 0–1.5)
BUN BLD-MCNC: 20 MG/DL (ref 8–23)
BUN/CREAT SERPL: 51.3 (ref 7–25)
CALCIUM SPEC-SCNC: 9.2 MG/DL (ref 8.6–10.5)
CHLORIDE SERPL-SCNC: 91 MMOL/L (ref 98–107)
CO2 SERPL-SCNC: 24.4 MMOL/L (ref 22–29)
CREAT BLD-MCNC: 0.39 MG/DL (ref 0.57–1)
DEPRECATED RDW RBC AUTO: 42.2 FL (ref 37–54)
EOSINOPHIL # BLD AUTO: 0.02 10*3/MM3 (ref 0–0.7)
EOSINOPHIL NFR BLD AUTO: 0.3 % (ref 0.3–6.2)
ERYTHROCYTE [DISTWIDTH] IN BLOOD BY AUTOMATED COUNT: 11.8 % (ref 11.7–13)
GFR SERPL CREATININE-BSD FRML MDRD: >150 ML/MIN/1.73
GLUCOSE BLD-MCNC: 96 MG/DL (ref 65–99)
HCT VFR BLD AUTO: 37.4 % (ref 35.6–45.5)
HGB BLD-MCNC: 13.1 G/DL (ref 11.9–15.5)
IMM GRANULOCYTES # BLD AUTO: 0.02 10*3/MM3 (ref 0–0.03)
IMM GRANULOCYTES NFR BLD AUTO: 0.3 % (ref 0–0.5)
LYMPHOCYTES # BLD AUTO: 1.03 10*3/MM3 (ref 0.9–4.8)
LYMPHOCYTES NFR BLD AUTO: 14 % (ref 19.6–45.3)
MCH RBC QN AUTO: 34.5 PG (ref 26.9–32)
MCHC RBC AUTO-ENTMCNC: 35 G/DL (ref 32.4–36.3)
MCV RBC AUTO: 98.4 FL (ref 80.5–98.2)
MONOCYTES # BLD AUTO: 0.19 10*3/MM3 (ref 0.2–1.2)
MONOCYTES NFR BLD AUTO: 2.6 % (ref 5–12)
NEUTROPHILS # BLD AUTO: 6.09 10*3/MM3 (ref 1.9–8.1)
NEUTROPHILS NFR BLD AUTO: 82.7 % (ref 42.7–76)
PLATELET # BLD AUTO: 283 10*3/MM3 (ref 140–500)
PMV BLD AUTO: 9.2 FL (ref 6–12)
POTASSIUM BLD-SCNC: 3.7 MMOL/L (ref 3.5–5.2)
RBC # BLD AUTO: 3.8 10*6/MM3 (ref 3.9–5.2)
SODIUM BLD-SCNC: 129 MMOL/L (ref 136–145)
WBC NRBC COR # BLD: 7.36 10*3/MM3 (ref 4.5–10.7)

## 2019-01-01 PROCEDURE — 85025 COMPLETE CBC W/AUTO DIFF WBC: CPT | Performed by: HOSPITALIST

## 2019-01-01 PROCEDURE — 80048 BASIC METABOLIC PNL TOTAL CA: CPT | Performed by: HOSPITALIST

## 2019-01-01 RX ADMIN — ASPIRIN 325 MG: 325 TABLET ORAL at 09:23

## 2019-01-01 RX ADMIN — FUROSEMIDE 20 MG: 20 TABLET ORAL at 20:02

## 2019-01-01 RX ADMIN — ATORVASTATIN CALCIUM 20 MG: 20 TABLET, FILM COATED ORAL at 20:03

## 2019-01-01 RX ADMIN — POTASSIUM CHLORIDE 20 MEQ: 1.5 POWDER, FOR SOLUTION ORAL at 20:04

## 2019-01-01 RX ADMIN — SODIUM CHLORIDE, PRESERVATIVE FREE 3 ML: 5 INJECTION INTRAVENOUS at 09:23

## 2019-01-01 RX ADMIN — FUROSEMIDE 20 MG: 20 TABLET ORAL at 09:23

## 2019-01-01 RX ADMIN — SODIUM CHLORIDE, PRESERVATIVE FREE 3 ML: 5 INJECTION INTRAVENOUS at 20:04

## 2019-01-01 RX ADMIN — POTASSIUM CHLORIDE 20 MEQ: 1.5 POWDER, FOR SOLUTION ORAL at 09:23

## 2019-01-01 NOTE — NURSING NOTE
Patient's daughter left room and now patient's sister is in room. Again family and patient have requested bed alarm and chair alarm be turned off. Again educated family and patient to call NA or RN prior to patient getting out of bed. CTM

## 2019-01-01 NOTE — NURSING NOTE
Daughter in room. Patient and daughter have requested that the bed alarm be turned off while family in room. Explained once again that the patient fell this morning because the alarm was off. Educated that it is in place for safety reasons. Family agreed to call when patient needed assistance with bathroom etc. NA or RN must be called prior to getting out of bed. Daughter voiced understanding. MARCOS

## 2019-01-01 NOTE — NURSING NOTE
"NA called RN stating patient was \"on floor\". RN entered room patient was sitting on ground. When asked what happened patient stated she was getting up to go to bathroom. Bed alarm was turned off per patient. RN educated multiple times regarding safety reasons bed alarm in in place. Yellow socks on patient at time. Chair alarm strip added to bed along with bed alarm. Patient's room near nursing station with door open. Charge RN aware. MD paged. CTM  "

## 2019-01-01 NOTE — PROGRESS NOTES
"   LOS: 8 days    Patient Care Team:  Bernie Francois MD as PCP - General (Family Medicine)  Leyla Canada MD as Consulting Physician (Hematology and Oncology)  Douglas Long MD as Consulting Physician (Hematology and Oncology)    Chief Complaint:    Chief Complaint   Patient presents with   • Altered Mental Status     Follow UP hypoNa   Subjective     Interval History:   Feels well. Tolerating fluid restriction. Requiring straight cath. Bowels moved. Eating a little .  Has fallen in room due to turning off bed alarm.   Objective     Vital Signs  Temp:  [98.2 °F (36.8 °C)-98.8 °F (37.1 °C)] 98.2 °F (36.8 °C)  Heart Rate:  [60-64] 64  Resp:  [16-18] 18  BP: (102-140)/(53-79) 135/72    Flowsheet Rows      First Filed Value   Admission Height  162.6 cm (64\") Documented at 12/24/2018 1517   Admission Weight  42.7 kg (94 lb 1.6 oz) Documented at 12/24/2018 1517          No intake/output data recorded.  I/O last 3 completed shifts:  In: -   Out: 1150 [Urine:1150]  No intake or output data in the 24 hours ending 01/01/19 1539    Physical Exam:  Frail, cachectic WF in no acute distress  Neck supple no JVD  Lungs Clear to auscultation   Heart RRR no murmur or rub  Abd soft, + bs, nontender  Ext no pedal edema 2+ radial pulses      Results Review:    Results from last 7 days   Lab Units  01/01/19   0657  12/31/18   0524  12/30/18   0606   SODIUM mmol/L  129*  130*  132*   POTASSIUM mmol/L  3.7  3.8  4.3   CHLORIDE mmol/L  91*  89*  94*   CO2 mmol/L  24.4  29.2*  28.0   BUN mg/dL  20  18  15   CREATININE mg/dL  0.39*  0.41*  0.44*   CALCIUM mg/dL  9.2  9.8  9.7   GLUCOSE mg/dL  96  92  105*       Estimated Creatinine Clearance: 39 mL/min (A) (by C-G formula based on SCr of 0.39 mg/dL (L)).    Results from last 7 days   Lab Units  12/31/18   0524  12/28/18   0552  12/27/18   1752   MAGNESIUM mg/dL  1.8  3.4*  1.3*             Results from last 7 days   Lab Units  01/01/19   0657  12/31/18   0524  " 12/30/18   0606  12/29/18   0634  12/28/18   0552   WBC 10*3/mm3  7.36  7.63  7.30  7.88  7.82   HEMOGLOBIN g/dL  13.1  13.1  13.0  12.3  12.8   PLATELETS 10*3/mm3  283  290  275  277  253               Imaging Results (last 24 hours)     ** No results found for the last 24 hours. **          aspirin 325 mg Oral Daily   Or      aspirin 300 mg Rectal Daily   atorvastatin 20 mg Oral Nightly   furosemide 20 mg Oral BID   nicotine 1 patch Transdermal Q24H   potassium chloride 20 mEq Oral BID   potassium chloride 40 mEq Oral Once   potassium chloride 10 mEq Intravenous Once   sodium chloride 3 mL Intravenous Q12H          Medication Review:   Current Facility-Administered Medications   Medication Dose Route Frequency Provider Last Rate Last Dose   • aspirin tablet 325 mg  325 mg Oral Daily Jo Harp MD   325 mg at 01/01/19 0923    Or   • aspirin suppository 300 mg  300 mg Rectal Daily Jo Harp MD   300 mg at 12/24/18 1657   • atorvastatin (LIPITOR) tablet 20 mg  20 mg Oral Nightly Jo Harp MD   20 mg at 12/31/18 2014   • furosemide (LASIX) tablet 20 mg  20 mg Oral BID Kevin Faust MD   20 mg at 01/01/19 0923   • hydrocortisone 1 % cream 1 application  1 application Topical TID PRN Marsha Hameed MD       • hydrOXYzine (VISTARIL) capsule 25 mg  25 mg Oral TID PRN Neftali Dowd MD   25 mg at 12/27/18 1833   • Magnesium Sulfate 2 gram Bolus, followed by 8 gram infusion (total Mg dose 10 grams)- Mg less than or equal to 1mg/dL  2 g Intravenous PRN Kevin Faust MD        Or   • Magnesium Sulfate 2 gram / 50mL Infusion (GIVE X 3 BAGS TO EQUAL 6GM TOTAL DOSE) - Mg 1.1 - 1.5 mg/dl  2 g Intravenous PRN Kevin Faust MD 25 mL/hr at 12/28/18 0531 2 g at 12/28/18 0531    Or   • Magnesium Sulfate 4 gram infusion- Mg 1.6-1.9 mg/dL  4 g Intravenous PRN Kevin Faust MD       • nicotine (NICODERM CQ) 21 MG/24HR patch 1 patch  1 patch Transdermal Q24H Neftali Dowd MD   Stopped at  12/27/18 2134   • potassium chloride (KLOR-CON) packet 20 mEq  20 mEq Oral BID Kevin Faust MD   20 mEq at 01/01/19 0923   • potassium chloride (KLOR-CON) packet 40 mEq  40 mEq Oral PRN Neftali Dowd MD   40 mEq at 12/29/18 1707   • potassium chloride (MICRO-K) CR capsule 40 mEq  40 mEq Oral PRN Neftali Dowd MD   40 mEq at 12/25/18 2007   • potassium chloride (MICRO-K) CR capsule 40 mEq  40 mEq Oral Once Kevin Faust MD       • potassium chloride 10 mEq in 100 mL IVPB  10 mEq Intravenous Once Yuridia De La Cruz PA       • potassium chloride 10 mEq in 100 mL IVPB  10 mEq Intravenous Q1H PRN Gary Jaime  mL/hr at 12/26/18 0159 10 mEq at 12/26/18 0159   • sodium chloride 0.9 % flush 10 mL  10 mL Intravenous PRN Yuridia De La Cruz PA       • sodium chloride 0.9 % flush 3 mL  3 mL Intravenous Q12H Jo Harp MD   3 mL at 01/01/19 0923   • sodium chloride 0.9 % flush 3-10 mL  3-10 mL Intravenous PRN Jo Harp MD       • zolpidem (AMBIEN) tablet 5 mg  5 mg Oral Nightly PRN Neftali Dowd MD           Assessment/Plan   1. 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 Trend has been down past 48 hours.  TSH & cortisol WNL.  GFR normal.  Euvolemic, on lasix.  May need to add salt tablets.    2. Metastatic small cell lung cancer on opdivo.  Due for dose Friday.  Will DW oncology.   3. Urinary retention: CIC per urology    Plan  1. Encouraged to eat and drink ( milkshake, supplement, V8)  2. Dw daughter      Mariely Harman MD  01/01/19  3:39 PM

## 2019-01-01 NOTE — SIGNIFICANT NOTE
01/01/19 0859   Rehab Treatment   Discipline physical therapy assistant   Reason Treatment Not Performed patient/family declined treatment, not feeling well  (pt refused stating she just had a fall and does not feel like moving; PT will follow up tomorrow)   Recommendation   PT - Next Appointment 01/02/19

## 2019-01-01 NOTE — PROGRESS NOTES
"DAILY PROGRESS NOTE  [facility]    Patient Identification:  Name: Sujata Waters  Age: 63 y.o.  Sex: female  :  1955  MRN: [id number]         Primary Care Physician: Bernie Francois MD    Subjective:  Interval History:She feels better today.  No new complaints.  Nurse reports she is falling.    Objective:    Scheduled Meds:    aspirin 325 mg Oral Daily   Or      aspirin 300 mg Rectal Daily   atorvastatin 20 mg Oral Nightly   furosemide 20 mg Oral BID   nicotine 1 patch Transdermal Q24H   potassium chloride 20 mEq Oral BID   potassium chloride 40 mEq Oral Once   potassium chloride 10 mEq Intravenous Once   sodium chloride 3 mL Intravenous Q12H     Continuous Infusions:     Vital signs in last 24 hours:  Temp:  [98.2 °F (36.8 °C)-98.8 °F (37.1 °C)] 98.2 °F (36.8 °C)  Heart Rate:  [60-64] 64  Resp:  [16-18] 18  BP: (102-140)/(53-79) 135/72    Intake/Output:  No intake or output data in the 24 hours ending 19 1539    Exam:  /72 (BP Location: Left arm, Patient Position: Lying)   Pulse 64   Temp 98.2 °F (36.8 °C) (Oral)   Resp 18   Ht 162.6 cm (64\")   Wt 35.2 kg (77 lb 8 oz)   SpO2 98%   BMI 13.30 kg/m²     General Appearance:    Alert, cooperative, no distress   Head:    Normocephalic, without obvious abnormality, atraumatic   Eyes:       Throat:   Lips, tongue, gums normal   Neck:   Supple, symmetrical, trachea midline, no JVD   Lungs:     Clear to auscultation bilaterally, respirations unlabored   Chest Wall:    No tenderness or deformity    Heart:    Regular rate and rhythm, S1 and S2 normal, no murmur,no  Rub or gallop   Abdomen:     Soft, non-tender, bowel sounds active, no masses, no organomegaly    Extremities:   Extremities normal, atraumatic, no cyanosis or edema   Pulses:      Skin:   Skin is warm and dry,  no rashes or palpable lesions   Neurologic:   no focal deficits noted      Lab Results (last 72 hours)     Procedure Component Value Units " Date/Time    Sodium [[id number]]  (Abnormal) Collected:  12/25/18 1423    Specimen:  Blood Updated:  12/25/18 1445     Sodium 130 mmol/L     Potassium [[id number]]  (Abnormal) Collected:  12/25/18 1423    Specimen:  Blood Updated:  12/25/18 1445     Potassium 3.1 mmol/L     Lipid Panel [[id number]] Collected:  12/25/18 0639    Specimen:  Blood Updated:  12/25/18 0851     Total Cholesterol 141 mg/dL      Triglycerides 61 mg/dL      HDL Cholesterol 57 mg/dL      LDL Cholesterol  72 mg/dL      VLDL Cholesterol 12.2 mg/dL      LDL/HDL Ratio 1.26    Narrative:       Cholesterol Reference Ranges  (U.S. Department of Health and Human Services ATP III Classifications)    Desirable          <200 mg/dL  Borderline High    200-239 mg/dL  High Risk          >240 mg/dL      Triglyceride Reference Ranges  (U.S. Department of Health and Human Services ATP III Classifications)    Normal           <150 mg/dL  Borderline High  150-199 mg/dL  High             200-499 mg/dL  Very High        >500 mg/dL    HDL Reference Ranges  (U.S. Department of Health and Human Services ATP III Classifcations)    Low     <40 mg/dl (major risk factor for CHD)  High    >60 mg/dl ('negative' risk factor for CHD)        LDL Reference Ranges  (U.S. Department of Health and Human Services ATP III Classifcations)    Optimal          <100 mg/dL  Near Optimal     100-129 mg/dL  Borderline High  130-159 mg/dL  High             160-189 mg/dL  Very High        >189 mg/dL    Sodium [[id number]]  (Abnormal) Collected:  12/25/18 0639    Specimen:  Blood Updated:  12/25/18 0851     Sodium 129 mmol/L     Hemoglobin A1c [[id number]]  (Normal) Collected:  12/25/18 0639    Specimen:  Blood Updated:  12/25/18 0842     Hemoglobin A1C 4.90 %     Narrative:       Hemoglobin A1C Ranges:    Increased Risk for Diabetes  5.7% to 6.4%  Diabetes                     >= 6.5%  Diabetic Goal                < 7.0%    Urinalysis With Culture If Indicated - Urine, Catheter In/Out [[id number]]   (Abnormal) Collected:  12/25/18 0652    Specimen:  Urine, Catheter In/Out Updated:  12/25/18 0837     Color, UA Yellow     Appearance, UA Clear     pH, UA 6.0     Specific Gravity, UA >=1.030     Glucose, UA Negative     Ketones, UA 80 mg/dL (3+)     Bilirubin, UA Negative     Blood, UA Negative     Protein, UA Negative     Leuk Esterase, UA Negative     Nitrite, UA Negative     Urobilinogen, UA 1.0 E.U./dL    Narrative:       Urine microscopic not indicated.    Osmolality, Urine - Urine, Catheter In/Out [[id number]] Collected:  12/25/18 0652    Specimen:  Urine, Catheter In/Out Updated:  12/25/18 0834     Osmolality, Urine 485 mOsm/kg     Narrative:       Osmo Normal Reference Ranges:    Random:  mOsm/kg H2O, depending on fluid intake.  Random: >850 mOsm/kg H20, after 12 hour fluid restriction.    24 Hour: 300-900 mOsm/kg H2O.    POC Glucose Once [[id number]]  (Normal) Collected:  12/25/18 0624    Specimen:  Blood Updated:  12/25/18 0626     Glucose 80 mg/dL     Sodium [[id number]]  (Abnormal) Collected:  12/24/18 2349    Specimen:  Blood Updated:  12/25/18 0040     Sodium 128 mmol/L     Cortisol [[id number]]  (Normal) Collected:  12/24/18 2130    Specimen:  Blood Updated:  12/24/18 2243     Cortisol 28.50 mcg/dL     Narrative:       Cortisol Reference Ranges:    Cortisol 6AM - 10AM Range: 6.02-18.40 mcg/dl  Cortisol 4PM - 8PM Range: 2.68-10.50 mcg/dl  Critical AM/PM:    Less than 2 mcg/dl    Sodium [[id number]]  (Abnormal) Collected:  12/24/18 2130    Specimen:  Blood Updated:  12/24/18 2201     Sodium 127 mmol/L     Sodium, Urine, Random - Urine, Catheter [[id number]] Collected:  12/24/18 1717    Specimen:  Urine, Catheter Updated:  12/24/18 1922     Sodium, Urine 24 mmol/L     Narrative:       Reference intervals for random urine have not been established.  Clinical usage is dependent upon physician's interpretation in combination with other laboratory tests.     Osmolality, Serum [[id number]]  (Abnormal)  Collected:  12/24/18 1624    Specimen:  Blood Updated:  12/24/18 1901     Osmolality 263 mOsm/kg     CBC & Differential [[id number]] Collected:  12/24/18 1624    Specimen:  Blood Updated:  12/24/18 1750    Narrative:       The following orders were created for panel order CBC & Differential.  Procedure                               Abnormality         Status                     ---------                               -----------         ------                     Scan Slide[[id number]]                   Normal              Final result               CBC Auto Differential[[id number]]        Abnormal            Final result                 Please view results for these tests on the individual orders.    Scan Slide [[id number]]  (Normal) Collected:  12/24/18 1624    Specimen:  Blood Updated:  12/24/18 1750     RBC Morphology Normal     WBC Morphology Normal     Platelet Morphology Normal    CBC Auto Differential [[id number]]  (Abnormal) Collected:  12/24/18 1624    Specimen:  Blood Updated:  12/24/18 1750     WBC 9.73 10*3/mm3      RBC 4.16 10*6/mm3      Hemoglobin 14.0 g/dL      Comment: ESTIMATED HGB        Hematocrit 42.0 %      Comment: SPUN HCT        .9 fL      MCH 33.7 pg      MCHC 33.3 g/dL      RDW 11.9 %      RDW-SD 40.0 fl      MPV 9.3 fL      Platelets 283 10*3/mm3      Neutrophil % 83.2 %      Lymphocyte % 16.0 %      Monocyte % 0.6 %      Eosinophil % 0.0 %      Basophil % 0.0 %      Immature Grans % 0.2 %      Neutrophils, Absolute 8.09 10*3/mm3      Lymphocytes, Absolute 1.56 10*3/mm3      Monocytes, Absolute 0.06 10*3/mm3      Eosinophils, Absolute 0.00 10*3/mm3      Basophils, Absolute 0.00 10*3/mm3      Immature Grans, Absolute 0.02 10*3/mm3     TSH [[id number]]  (Normal) Collected:  12/24/18 1624    Specimen:  Blood Updated:  12/24/18 1748     TSH 0.659 mIU/mL     [place] Draw [[id number]] Collected:  12/24/18 1624    Specimen:  Blood Updated:  12/24/18 1745    Narrative:       The following orders  were created for panel order [place] Draw.  Procedure                               Abnormality         Status                     ---------                               -----------         ------                     Light Blue Top[[id number]]                                   Final result               Green Top (Gel)[[id number]]                                  Final result               Lavender Top[[id number]]                                     Final result               Gold Top - SST[[id number]]                                   Final result                 Please view results for these tests on the individual orders.    Lavender Top [[id number]] Collected:  12/24/18 1624    Specimen:  Blood Updated:  12/24/18 1745     Extra Tube hold for add-on     Comment: Auto resulted       Urinalysis With Microscopic If Indicated (No Culture) - Urine, Catheter [[id number]]  (Abnormal) Collected:  12/24/18 1717    Specimen:  Urine, Catheter Updated:  12/24/18 1736     Color, UA Yellow     Appearance, UA Clear     pH, UA 6.5     Specific Gravity, UA 1.015     Glucose, UA Negative     Ketones, UA 40 mg/dL (2+)     Bilirubin, UA Negative     Blood, UA Negative     Protein, UA Negative     Leuk Esterase, UA Negative     Nitrite, UA Negative     Urobilinogen, UA 0.2 E.U./dL    Narrative:       Urine microscopic not indicated.    Light Blue Top [[id number]] Collected:  12/24/18 1624    Specimen:  Blood Updated:  12/24/18 1730     Extra Tube hold for add-on     Comment: Auto resulted       Gold Top - SST [[id number]] Collected:  12/24/18 1624    Specimen:  Blood Updated:  12/24/18 1730     Extra Tube Hold for add-ons.     Comment: Auto resulted.       Green Top (Gel) [[id number]] Collected:  12/24/18 1624    Specimen:  Blood Updated:  12/24/18 1730     Extra Tube Hold for add-ons.     Comment: Auto resulted.       Vitamin B12 [[id number]]  (Abnormal) Collected:  12/24/18 1624    Specimen:  Blood Updated:  12/24/18 1717     Vitamin B-12  1,655 pg/mL     Comprehensive Metabolic Panel [[id number]]  (Abnormal) Collected:  12/24/18 1624    Specimen:  Blood Updated:  12/24/18 1656     Glucose 90 mg/dL      BUN 21 mg/dL      Creatinine 0.62 mg/dL      Sodium 123 mmol/L      Potassium 3.1 mmol/L      Chloride 82 mmol/L      CO2 22.8 mmol/L      Calcium 9.8 mg/dL      Total Protein 7.2 g/dL      Albumin 4.30 g/dL      ALT (SGPT) 14 U/L      AST (SGOT) 25 U/L      Alkaline Phosphatase 61 U/L      Total Bilirubin 0.9 mg/dL      eGFR Non African Amer 97 mL/min/1.73      Globulin 2.9 gm/dL      A/G Ratio 1.5 g/dL      BUN/Creatinine Ratio 33.9     Anion Gap 18.2 mmol/L     Troponin [[id number]]  (Normal) Collected:  12/24/18 1624    Specimen:  Blood Updated:  12/24/18 1656     Troponin T <0.010 ng/mL     Narrative:       Troponin T Reference Ranges:  Less than 0.03 ng/mL:    Negative for AMI  0.03 to 0.09 ng/mL:      Indeterminant for AMI  Greater than 0.09 ng/mL: Positive for AMI    Protime-INR [[id number]]  (Normal) Collected:  12/24/18 1624    Specimen:  Blood Updated:  12/24/18 1647     Protime 13.5 Seconds      INR 1.05        Data Review:  Results from last 7 days   Lab Units  01/01/19   0657  12/31/18   0524  12/30/18   0606   SODIUM mmol/L  129*  130*  132*   POTASSIUM mmol/L  3.7  3.8  4.3   CHLORIDE mmol/L  91*  89*  94*   CO2 mmol/L  24.4  29.2*  28.0   BUN mg/dL  20  18  15   CREATININE mg/dL  0.39*  0.41*  0.44*   GLUCOSE mg/dL  96  92  105*   CALCIUM mg/dL  9.2  9.8  9.7     Results from last 7 days   Lab Units  01/01/19   0657  12/31/18   0524  12/30/18   0606   WBC 10*3/mm3  7.36  7.63  7.30   HEMOGLOBIN g/dL  13.1  13.1  13.0   HEMATOCRIT %  37.4  36.1  37.7   PLATELETS 10*3/mm3  283  290  275             Lab Results   Lab Value Date/Time    TROPONINT <0.010 12/24/2018 1624               Invalid input(s): PROT, LABALBU          No results found for: POCGLU        Past Medical History:   Diagnosis Date   • Anxiety    • Basal cell carcinoma      Follows up with Dermatology annually, PA with Dr. Antoine's office   • Bowen's disease of vulva    • Depression    • History of kidney stone 2018   • Left adrenal mass (CMS/HCC)    • Lung cancer (CMS/HCC)     Right lung with metastasis to neck node.   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       Plan:  nephrology consult noted. Neurology consult noted and oncology. Correct sodium.   urology consult and intermitant cath for urine retention. Fluid restriction. Follow up labs.    PT eval. Encourage to work with PT. Will need SNU for rehab.  She is too weak to go home.    Neftali Dowd MD  1/1/2019  3:39 PM

## 2019-01-01 NOTE — PLAN OF CARE
Problem: Patient Care Overview  Goal: Plan of Care Review  Outcome: Ongoing (interventions implemented as appropriate)   12/31/18 1130 01/01/19 1444 01/01/19 8664   Coping/Psychosocial   Plan of Care Reviewed With --  patient --    Plan of Care Review   Progress no change --  --    OTHER   Outcome Summary --  --  Fall this am. educated multiple times regarding safety and falls risks. Meds per order. Family at bedside most of shift. CTM      Goal: Individualization and Mutuality  Outcome: Ongoing (interventions implemented as appropriate)    Goal: Discharge Needs Assessment  Outcome: Ongoing (interventions implemented as appropriate)    Goal: Interprofessional Rounds/Family Conf  Outcome: Ongoing (interventions implemented as appropriate)      Problem: Skin Injury Risk (Adult)  Goal: Identify Related Risk Factors and Signs and Symptoms  Outcome: Ongoing (interventions implemented as appropriate)    Goal: Skin Health and Integrity  Outcome: Ongoing (interventions implemented as appropriate)      Problem: Fall Risk (Adult)  Goal: Identify Related Risk Factors and Signs and Symptoms  Outcome: Ongoing (interventions implemented as appropriate)    Goal: Absence of Fall  Outcome: Ongoing (interventions implemented as appropriate)      Problem: Nutrition, Imbalanced: Inadequate Oral Intake (Adult)  Goal: Identify Related Risk Factors and Signs and Symptoms  Outcome: Ongoing (interventions implemented as appropriate)    Goal: Improved Oral Intake  Outcome: Ongoing (interventions implemented as appropriate)    Goal: Prevent Further Weight Loss  Outcome: Ongoing (interventions implemented as appropriate)

## 2019-01-02 LAB
ANION GAP SERPL CALCULATED.3IONS-SCNC: 12.9 MMOL/L
BASOPHILS # BLD AUTO: 0.01 10*3/MM3 (ref 0–0.2)
BASOPHILS NFR BLD AUTO: 0.2 % (ref 0–1.5)
BUN BLD-MCNC: 19 MG/DL (ref 8–23)
BUN/CREAT SERPL: 50 (ref 7–25)
CALCIUM SPEC-SCNC: 9.2 MG/DL (ref 8.6–10.5)
CHLORIDE SERPL-SCNC: 91 MMOL/L (ref 98–107)
CO2 SERPL-SCNC: 26.1 MMOL/L (ref 22–29)
CREAT BLD-MCNC: 0.38 MG/DL (ref 0.57–1)
CREAT BLDA-MCNC: 0.6 MG/DL (ref 0.6–1.3)
DEPRECATED RDW RBC AUTO: 42.1 FL (ref 37–54)
EOSINOPHIL # BLD AUTO: 0.02 10*3/MM3 (ref 0–0.7)
EOSINOPHIL NFR BLD AUTO: 0.3 % (ref 0.3–6.2)
ERYTHROCYTE [DISTWIDTH] IN BLOOD BY AUTOMATED COUNT: 11.7 % (ref 11.7–13)
GFR SERPL CREATININE-BSD FRML MDRD: >150 ML/MIN/1.73
GLUCOSE BLD-MCNC: 99 MG/DL (ref 65–99)
HCT VFR BLD AUTO: 37.2 % (ref 35.6–45.5)
HGB BLD-MCNC: 13.2 G/DL (ref 11.9–15.5)
IMM GRANULOCYTES # BLD AUTO: 0.02 10*3/MM3 (ref 0–0.03)
IMM GRANULOCYTES NFR BLD AUTO: 0.3 % (ref 0–0.5)
LYMPHOCYTES # BLD AUTO: 1.04 10*3/MM3 (ref 0.9–4.8)
LYMPHOCYTES NFR BLD AUTO: 15.7 % (ref 19.6–45.3)
MAGNESIUM SERPL-MCNC: 1.9 MG/DL (ref 1.6–2.4)
MCH RBC QN AUTO: 34.8 PG (ref 26.9–32)
MCHC RBC AUTO-ENTMCNC: 35.5 G/DL (ref 32.4–36.3)
MCV RBC AUTO: 98.2 FL (ref 80.5–98.2)
MONOCYTES # BLD AUTO: 0.17 10*3/MM3 (ref 0.2–1.2)
MONOCYTES NFR BLD AUTO: 2.6 % (ref 5–12)
NEUTROPHILS # BLD AUTO: 5.36 10*3/MM3 (ref 1.9–8.1)
NEUTROPHILS NFR BLD AUTO: 80.9 % (ref 42.7–76)
PLATELET # BLD AUTO: 276 10*3/MM3 (ref 140–500)
PMV BLD AUTO: 9.2 FL (ref 6–12)
POTASSIUM BLD-SCNC: 3 MMOL/L (ref 3.5–5.2)
RBC # BLD AUTO: 3.79 10*6/MM3 (ref 3.9–5.2)
SODIUM BLD-SCNC: 130 MMOL/L (ref 136–145)
WBC NRBC COR # BLD: 6.62 10*3/MM3 (ref 4.5–10.7)

## 2019-01-02 PROCEDURE — 97110 THERAPEUTIC EXERCISES: CPT

## 2019-01-02 PROCEDURE — 80048 BASIC METABOLIC PNL TOTAL CA: CPT | Performed by: HOSPITALIST

## 2019-01-02 PROCEDURE — 83735 ASSAY OF MAGNESIUM: CPT | Performed by: HOSPITALIST

## 2019-01-02 PROCEDURE — 85025 COMPLETE CBC W/AUTO DIFF WBC: CPT | Performed by: HOSPITALIST

## 2019-01-02 PROCEDURE — 84132 ASSAY OF SERUM POTASSIUM: CPT | Performed by: HOSPITALIST

## 2019-01-02 RX ORDER — POTASSIUM CHLORIDE 750 MG/1
40 CAPSULE, EXTENDED RELEASE ORAL AS NEEDED
Status: DISCONTINUED | OUTPATIENT
Start: 2019-01-02 | End: 2019-01-06 | Stop reason: HOSPADM

## 2019-01-02 RX ORDER — NITROGLYCERIN 0.4 MG/1
0.4 TABLET SUBLINGUAL
Status: DISCONTINUED | OUTPATIENT
Start: 2019-01-02 | End: 2019-01-06 | Stop reason: HOSPADM

## 2019-01-02 RX ORDER — POTASSIUM CHLORIDE 1.5 G/1.77G
40 POWDER, FOR SOLUTION ORAL 2 TIMES DAILY
Status: DISCONTINUED | OUTPATIENT
Start: 2019-01-02 | End: 2019-01-06 | Stop reason: HOSPADM

## 2019-01-02 RX ORDER — POTASSIUM CHLORIDE 7.45 MG/ML
10 INJECTION INTRAVENOUS
Status: DISCONTINUED | OUTPATIENT
Start: 2019-01-02 | End: 2019-01-06 | Stop reason: HOSPADM

## 2019-01-02 RX ORDER — POTASSIUM CHLORIDE 1.5 G/1.77G
40 POWDER, FOR SOLUTION ORAL AS NEEDED
Status: DISCONTINUED | OUTPATIENT
Start: 2019-01-02 | End: 2019-01-06 | Stop reason: HOSPADM

## 2019-01-02 RX ADMIN — FUROSEMIDE 20 MG: 20 TABLET ORAL at 17:48

## 2019-01-02 RX ADMIN — ZOLPIDEM TARTRATE 5 MG: 5 TABLET ORAL at 20:49

## 2019-01-02 RX ADMIN — POTASSIUM CHLORIDE 40 MEQ: 750 CAPSULE, EXTENDED RELEASE ORAL at 08:45

## 2019-01-02 RX ADMIN — ASPIRIN 325 MG: 325 TABLET ORAL at 08:45

## 2019-01-02 RX ADMIN — FUROSEMIDE 20 MG: 20 TABLET ORAL at 08:45

## 2019-01-02 RX ADMIN — POTASSIUM CHLORIDE 40 MEQ: 1.5 POWDER, FOR SOLUTION ORAL at 20:49

## 2019-01-02 RX ADMIN — POTASSIUM CHLORIDE 40 MEQ: 750 CAPSULE, EXTENDED RELEASE ORAL at 17:48

## 2019-01-02 RX ADMIN — ATORVASTATIN CALCIUM 20 MG: 20 TABLET, FILM COATED ORAL at 20:49

## 2019-01-02 RX ADMIN — POTASSIUM CHLORIDE 40 MEQ: 750 CAPSULE, EXTENDED RELEASE ORAL at 11:46

## 2019-01-02 RX ADMIN — SODIUM CHLORIDE, PRESERVATIVE FREE 3 ML: 5 INJECTION INTRAVENOUS at 20:52

## 2019-01-02 NOTE — PLAN OF CARE
Problem: Nutrition, Imbalanced: Inadequate Oral Intake (Adult)  Intervention: Promote/Optimize Nutrition   01/02/19 0953   Nutrition Interventions   Oral Nutrition Promotion (Record % of po intake of meals)

## 2019-01-02 NOTE — CONSULTS
Adult Nutrition  Assessment/PES    Patient Name:  Sujata Waters  YOB: 1955  MRN: [id number]  Admit Date:  12/24/2018    Assessment Date:  1/2/2019    Comments:  Follow up. Po intake continues to be poor. Po encouraged. Food pref's discussed.    Reason for Assessment     Row Name 01/02/19 0858          Reason for Assessment    Reason For Assessment  follow-up protocol         Nutrition/Diet History     Row Name 01/02/19 0858          Nutrition/Diet History    Typical Food/Fluid Intake  tried to drink some coffee this am but spilled it on her         Anthropometrics     Row Name 01/02/19 0400          Anthropometrics    Weight  34.1 kg (75 lb 3.2 oz)         Labs/Tests/Procedures/Meds     Row Name 01/02/19 0859          Labs/Procedures/Meds    Lab Results Reviewed  reviewed        Diagnostic Tests/Procedures    Diagnostic Test/Procedure Reviewed  reviewed        Medications    Pertinent Medications Reviewed  reviewed         Physical Findings     Row Name 01/02/19 0859          Physical Findings    Overall Physical Appearance  underweight     Skin  -- skin tear           Nutrition Prescription Ordered     Row Name 01/02/19 0900          Nutrition Prescription PO    Current PO Diet  Regular     Common Modifiers  Fluid Restriction     Fluid Restriction mL per Day  -- 1200         Evaluation of Received Nutrient/Fluid Intake     Row Name 01/02/19 0901          PO Evaluation    % PO Intake  poor               Problem/Interventions:            Intervention Goal     Row Name 01/02/19 0901          Intervention Goal    General  Disease management/therapy;Meet nutritional needs for age/condition     PO  PO intake (%);Increase intake     PO Intake %  75 %     Weight  Appropriate weight gain         Nutrition Intervention     Row Name 01/02/19 0902          Nutrition Intervention    RD/Tech Action  Follow Tx progress;Care plan reviewd;Encourage intake         Nutrition Prescription     Row Name 01/02/19 0902           Nutrition Prescription PO    Supplement Frequency  -- on fluid restriction now         Education/Evaluation     Row Name 01/02/19 0902          Monitor/Evaluation    Monitor  Per protocol;PO intake;Pertinent labs;Weight;Skin status           Electronically signed by:  Karyn Urena RD  01/02/19 9:02 AM

## 2019-01-02 NOTE — PROGRESS NOTES
"   LOS: 9 days    Patient Care Team:  Bernie Francois MD as PCP - General (Family Medicine)  Leyla Canada MD as Consulting Physician (Hematology and Oncology)  Douglas Long MD as Consulting Physician (Hematology and Oncology)    Chief Complaint:    Chief Complaint   Patient presents with   • Altered Mental Status     Follow UP hypoNa   Subjective     Interval History:   Quiet today. Answers questions, but seems guarded.  Family not in room currently. Not eating much on tray.  Dietician spoke to her this am about food preferences.  She is worried about getting her Opdivo Friday.   Objective     Vital Signs  Temp:  [98 °F (36.7 °C)-98.4 °F (36.9 °C)] 98 °F (36.7 °C)  Heart Rate:  [60-65] 63  Resp:  [16-18] 16  BP: (110-135)/(58-72) 118/68    Flowsheet Rows      First Filed Value   Admission Height  162.6 cm (64\") Documented at 12/24/2018 1517   Admission Weight  42.7 kg (94 lb 1.6 oz) Documented at 12/24/2018 1517          I/O this shift:  In: -   Out: 125 [Urine:125]  I/O last 3 completed shifts:  In: 600 [P.O.:600]  Out: 725 [Urine:725]    Intake/Output Summary (Last 24 hours) at 1/2/2019 1251  Last data filed at 1/2/2019 1149  Gross per 24 hour   Intake 600 ml   Output 850 ml   Net -250 ml       Physical Exam:  Frail, cachectic WF in no acute distress  Neck supple no JVD  Lungs Clear to auscultation   Heart RRR no murmur or rub  Abd soft, + bs, nontender  Ext no pedal edema 2+ radial pulses      Results Review:    Results from last 7 days   Lab Units  01/02/19   0516  01/01/19   0657  12/31/18   0524   SODIUM mmol/L  130*  129*  130*   POTASSIUM mmol/L  3.0*  3.7  3.8   CHLORIDE mmol/L  91*  91*  89*   CO2 mmol/L  26.1  24.4  29.2*   BUN mg/dL  19  20  18   CREATININE mg/dL  0.38*  0.39*  0.41*   CALCIUM mg/dL  9.2  9.2  9.8   GLUCOSE mg/dL  99  96  92       Estimated Creatinine Clearance: 37.7 mL/min (A) (by C-G formula based on SCr of 0.38 mg/dL (L)).    Results from last 7 days   Lab " Units  12/31/18   0524  12/28/18   0552  12/27/18   1752   MAGNESIUM mg/dL  1.8  3.4*  1.3*             Results from last 7 days   Lab Units  01/02/19   0516  01/01/19   0657  12/31/18   0524  12/30/18   0606  12/29/18   0634   WBC 10*3/mm3  6.62  7.36  7.63  7.30  7.88   HEMOGLOBIN g/dL  13.2  13.1  13.1  13.0  12.3   PLATELETS 10*3/mm3  276  283  290  275  277               Imaging Results (last 24 hours)     ** No results found for the last 24 hours. **          aspirin 325 mg Oral Daily   Or      aspirin 300 mg Rectal Daily   atorvastatin 20 mg Oral Nightly   furosemide 20 mg Oral BID   nicotine 1 patch Transdermal Q24H   potassium chloride 40 mEq Oral BID   potassium chloride 40 mEq Oral Once   potassium chloride 10 mEq Intravenous Once   sodium chloride 3 mL Intravenous Q12H          Medication Review:   Current Facility-Administered Medications   Medication Dose Route Frequency Provider Last Rate Last Dose   • aspirin tablet 325 mg  325 mg Oral Daily Jo Harp MD   325 mg at 01/02/19 0845    Or   • aspirin suppository 300 mg  300 mg Rectal Daily Jo Harp MD   300 mg at 12/24/18 1657   • atorvastatin (LIPITOR) tablet 20 mg  20 mg Oral Nightly Jo Harp MD   20 mg at 01/01/19 2003   • furosemide (LASIX) tablet 20 mg  20 mg Oral BID Kevin Faust MD   20 mg at 01/02/19 0845   • hydrocortisone 1 % cream 1 application  1 application Topical TID PRN Marsha Hameed MD       • hydrOXYzine (VISTARIL) capsule 25 mg  25 mg Oral TID PRN Neftali Dowd MD   25 mg at 12/27/18 1833   • Magnesium Sulfate 2 gram Bolus, followed by 8 gram infusion (total Mg dose 10 grams)- Mg less than or equal to 1mg/dL  2 g Intravenous PRN Kevin Faust MD        Or   • Magnesium Sulfate 2 gram / 50mL Infusion (GIVE X 3 BAGS TO EQUAL 6GM TOTAL DOSE) - Mg 1.1 - 1.5 mg/dl  2 g Intravenous PRN Kevin Faust MD 25 mL/hr at 12/28/18 0531 2 g at 12/28/18 0531    Or   • Magnesium Sulfate 4 gram  infusion- Mg 1.6-1.9 mg/dL  4 g Intravenous PRN Kevin Faust MD       • nicotine (NICODERM CQ) 21 MG/24HR patch 1 patch  1 patch Transdermal Q24H Neftali Dowd MD   Stopped at 12/27/18 2134   • nitroglycerin (NITROSTAT) SL tablet 0.4 mg  0.4 mg Sublingual Q5 Min PRN Neftali Dowd MD       • potassium chloride (KLOR-CON) packet 40 mEq  40 mEq Oral PRN Neftali Dowd MD   40 mEq at 12/29/18 1707   • potassium chloride (MICRO-K) CR capsule 40 mEq  40 mEq Oral PRN Neftali Dowd MD        Or   • potassium chloride (KLOR-CON) packet 40 mEq  40 mEq Oral PRN Neftali Dowd MD        Or   • potassium chloride 10 mEq in 100 mL IVPB  10 mEq Intravenous Q1H PRN Neftali Dowd MD       • potassium chloride (KLOR-CON) packet 40 mEq  40 mEq Oral BID Mariely Harman MD       • potassium chloride (MICRO-K) CR capsule 40 mEq  40 mEq Oral PRN Neftali Dowd MD   40 mEq at 01/02/19 0845   • potassium chloride (MICRO-K) CR capsule 40 mEq  40 mEq Oral Once Kevin Faust MD       • potassium chloride 10 mEq in 100 mL IVPB  10 mEq Intravenous Once Yuridia De L aCruz PA       • potassium chloride 10 mEq in 100 mL IVPB  10 mEq Intravenous Q1H PRN Gary Jaime  mL/hr at 12/26/18 0159 10 mEq at 12/26/18 0159   • sodium chloride 0.9 % flush 10 mL  10 mL Intravenous PRN Yuridia De La Cruz PA       • sodium chloride 0.9 % flush 3 mL  3 mL Intravenous Q12H Jo Harp MD   3 mL at 01/01/19 2004   • sodium chloride 0.9 % flush 3-10 mL  3-10 mL Intravenous PRN Jo Harp MD       • zolpidem (AMBIEN) tablet 5 mg  5 mg Oral Nightly PRN Neftali Dowd MD           Assessment/Plan   1. 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  TSH & cortisol WNL.  GFR normal.  Euvolemic, on lasix. Stable today at 130.   2. Metastatic small cell lung cancer on opdivo sp one dose 12/10.  Due again Jan 14.   3. Urinary retention: CIC recommended.  I am not certain that she will  be able to do this.     Plan  1. Encouraged to eat and drink ( milkshake, supplement, V8)  2. Dw Dr. Coley.  He is going to check with office about opdivo. She is actually scheduled Jan 14 at 12:40 to see Dr. Canada and get labs for opdivo.       Mariely Harman MD  01/02/19  12:51 PM

## 2019-01-02 NOTE — PLAN OF CARE
Problem: Patient Care Overview  Goal: Plan of Care Review  Outcome: Ongoing (interventions implemented as appropriate)   01/02/19 0516   Coping/Psychosocial   Plan of Care Reviewed With patient   Plan of Care Review   Progress no change   OTHER   Outcome Summary Patient slept fitfully tonight. Bed alarm remained all night and RN was at bedside frequently to ensure patient safety. Patient continues to be very weak. Patient required In and out cath tonight in order to void. Continue to bladder scan PRN. Vital signs remain stable. Continue to monitor closely, continue with current plan of care       Problem: Skin Injury Risk (Adult)  Goal: Identify Related Risk Factors and Signs and Symptoms  Outcome: Outcome(s) achieved Date Met: 01/02/19    Goal: Skin Health and Integrity  Outcome: Ongoing (interventions implemented as appropriate)      Problem: Fall Risk (Adult)  Goal: Identify Related Risk Factors and Signs and Symptoms  Outcome: Outcome(s) achieved Date Met: 01/02/19    Goal: Absence of Fall  Outcome: Ongoing (interventions implemented as appropriate)      Problem: Nutrition, Imbalanced: Inadequate Oral Intake (Adult)  Goal: Identify Related Risk Factors and Signs and Symptoms  Outcome: Ongoing (interventions implemented as appropriate)    Goal: Improved Oral Intake  Outcome: Ongoing (interventions implemented as appropriate)    Goal: Prevent Further Weight Loss  Outcome: Ongoing (interventions implemented as appropriate)

## 2019-01-02 NOTE — THERAPY TREATMENT NOTE
Acute Care - Physical Therapy Treatment Note  [facility]     Patient Name: Sujata Waters  : 1955  MRN: [id number]  Today's Date: 2019  Onset of Illness/Injury or Date of Surgery: 18  Date of Referral to PT: 18       Admit Date: 2018    Visit Dx:    ICD-10-CM ICD-9-CM   1. Altered mental status, unspecified altered mental status type R41.82 780.97   2. Primary malignant neoplasm of right lung metastatic to other site (CMS/HCC) C34.91 162.9   3. Hyponatremia E87.1 276.1   4. General weakness R53.1 780.79     Patient Active Problem List   Diagnosis   • AR (allergic rhinitis)   • Postherpetic neuralgia   • Prolapse of mitral valve   • Rosacea   • Smoking addiction   • Left adrenal mass (CMS/HCC)   • Right renal mass   • Uterine mass   • Basal cell carcinoma   • Shingles   • Left cervical lymphadenopathy   • Weight loss   • RUQ pain   • Renal cyst   • Lung mass   • Metastatic cancer (CMS/HCC)   • Small cell lung cancer (CMS/HCC)   • Adenocarcinoma (CMS/HCC)   • Fitting and adjustment of vascular catheter   • Altered mental status   • Hyponatremia   • Urine retention       Therapy Treatment    Rehabilitation Treatment Summary     Row Name 19 1700             Treatment Time/Intention    Discipline  physical therapist  -EF      Document Type  therapy note (daily note)  -EF      Subjective Information  complains of;weakness;fatigue  -EF      Mode of Treatment  physical therapy  -EF      Patient/Family Observations  sitting up in bed watching tv  -EF      Patient Effort  fair  -EF      Existing Precautions/Restrictions  fall  -EF      Recorded by [EF] Lela Espino, PT 19 2971      Row Name 19 1700             Bed Mobility Assessment/Treatment    Supine-Sit [place] (Bed Mobility)  verbal cues;minimum assist (75% patient effort)  -EF      Sit-Supine [place] (Bed Mobility)  verbal cues;contact guard  -EF      Bed Mobility, Safety Issues  cognitive deficits  "limit understanding;decreased use of arms for pushing/pulling;decreased use of legs for bridging/pushing  -EF      Assistive Device (Bed Mobility)  bed rails;head of bed elevated  -EF      Recorded by [EF] Lela Espino, PT 01/02/19 1736      Row Name 01/02/19 1700             Sit-Stand Transfer    Sit-Stand [place] (Transfers)  verbal cues;minimum assist (75% patient effort)  -EF      Assistive Device (Sit-Stand Transfers)  walker, front-wheeled  -EF      Recorded by [EF] Lela Espino, PT 01/02/19 1736      Row Name 01/02/19 1700             Stand-Sit Transfer    Stand-Sit [place] (Transfers)  verbal cues;minimum assist (75% patient effort)  -EF      Assistive Device (Stand-Sit Transfers)  walker, front-wheeled  -EF      Recorded by [EF] Lela Espino, PT 01/02/19 1736      Row Name 01/02/19 1700             Gait/Stairs Assessment/Training    [place] Level (Gait)  verbal cues;minimum assist (75% patient effort)  -EF      Assistive Device (Gait)  walker, front-wheeled  -EF      Distance in Feet (Gait)  several steps toward HOB  -EF      Deviations/Abnormal Patterns (Gait)  gait speed decreased;stride length decreased  -EF      Bilateral Gait Deviations  forward flexed posture  -EF      Comment (Gait/Stairs)  pt became \"shaky\" upon standing & requested getting back in bed  -EF      Recorded by [EF] Lela Espino, PT 01/02/19 1736      Row Name 01/02/19 1700             Therapeutic Exercise    Comment (Therapeutic Exercise)  sitting LAQ x 5 reps each  -EF      Recorded by [EF] Lela Espino, PT 01/02/19 1736      Row Name 01/02/19 1700             Positioning and Restraints    Pre-Treatment Position  in bed  -EF      Post Treatment Position  bed  -EF      In Bed  fowlers;call light within reach;encouraged to call for assist;exit alarm on  -EF      Recorded by [LIANG] Lela Espino, PT 01/02/19 1736      Row Name 01/02/19 1700             Pain Assessment    Additional " Documentation  Pain Scale: Word Pre/Post-Treatment (Group)  -EF      Recorded by [EF] Lela Espino, PT 01/02/19 1736      Row Name 01/02/19 1700             Pain Scale: Word Pre/Post-Treatment    Pre/Post Treatment Pain Comment  pt did not describe pain when asked but stated she did not feel well  -EF      Recorded by [EF] Lela Espino, PT 01/02/19 1736      Row Name                Wound 12/31/18 Right anterior wrist skin tear    Wound - Properties Group Date first assessed: 12/31/18 [AR] Present On Admission : no [AR] Side: Right [AR] Orientation: anterior [AR] Location: wrist [AR] Type: skin tear [AR] Recorded by:  [AR] Meena Cueto RN 12/31/18 6518      User Key  (r) = Recorded By, (t) = Taken By, (c) = Cosigned By    Initials Name Effective Dates Discipline    EF Lela Espino, PT 06/08/18 -  PT    Meena Jones RN 09/13/17 -  Nurse          Wound 12/31/18 Right anterior wrist skin tear (Active)   Dressing Appearance no drainage 1/2/2019  2:30 PM   Closure None 1/2/2019 12:00 AM   Base dressing in place, unable to visualize 1/2/2019  2:30 PM   Dressing Care, Wound other (see comments) 1/2/2019  2:30 PM           Physical Therapy Education     Title: PT OT SLP Therapies (In Progress)     Topic: Physical Therapy (In Progress)     Point: Mobility training (In Progress)     Learning Progress Summary           Patient Acceptance, E, NR,NL by EF at 1/2/2019  5:36 PM    Acceptance, E,D, NR by PC at 12/31/2018 11:30 AM    Acceptance, E,TB, VU,DU by CW at 12/30/2018  9:41 AM    Acceptance, E,TB, VU by CS at 12/29/2018 10:57 AM    Acceptance, E,TB, VU,DU by CW at 12/28/2018  9:01 AM    Acceptance, E,TB, VU by AK at 12/28/2018  2:37 AM    Acceptance, TB,E, NR,DU by CW at 12/27/2018  9:43 AM    Acceptance, E, NR by EF at 12/26/2018  2:01 PM    Comment:  Dgt voiced understanding, pt needs reinforcement   Family Acceptance, E,TB, VU,DU by CW at 12/28/2018  9:01 AM    Acceptance, E, NR by EF at 12/26/2018   2:01 PM    Comment:  Dgt voiced understanding, pt needs reinforcement                   Point: Home exercise program (In Progress)     Learning Progress Summary           Patient Acceptance, E, NR,NL by EF at 1/2/2019  5:36 PM    Acceptance, E,TB, VU,DU by CW at 12/30/2018  9:41 AM    Acceptance, E,TB, VU by CS at 12/29/2018 10:57 AM    Acceptance, E,TB, VU,DU by CW at 12/28/2018  9:01 AM    Acceptance, E,TB, VU by AK at 12/28/2018  2:37 AM    Acceptance, TB,E, NR,DU by CW at 12/27/2018  9:43 AM    Acceptance, E, NR by EF at 12/26/2018  2:01 PM    Comment:  Dgt voiced understanding, pt needs reinforcement   Family Acceptance, E,TB, VU,DU by CW at 12/28/2018  9:01 AM    Acceptance, E, NR by EF at 12/26/2018  2:01 PM    Comment:  Dgt voiced understanding, pt needs reinforcement                   Point: Body mechanics (In Progress)     Learning Progress Summary           Patient Acceptance, E, NR,NL by EF at 1/2/2019  5:36 PM    Acceptance, E,D, NR by PC at 12/31/2018 11:30 AM    Acceptance, E,TB, VU,DU by CW at 12/30/2018  9:41 AM    Acceptance, E,TB, VU by CS at 12/29/2018 10:57 AM    Acceptance, E,TB, VU,DU by CW at 12/28/2018  9:01 AM    Acceptance, E,TB, VU by AK at 12/28/2018  2:37 AM    Acceptance, TB,E, NR,DU by CW at 12/27/2018  9:43 AM    Acceptance, E, NR by EF at 12/26/2018  2:01 PM    Comment:  Dgt voiced understanding, pt needs reinforcement   Family Acceptance, E,TB, VU,DU by CW at 12/28/2018  9:01 AM    Acceptance, E, NR by EF at 12/26/2018  2:01 PM    Comment:  Dgt voiced understanding, pt needs reinforcement                   Point: Precautions (In Progress)     Learning Progress Summary           Patient Acceptance, E, NR,NL by EF at 1/2/2019  5:36 PM    Acceptance, E,D, NR by PC at 12/31/2018 11:30 AM    Acceptance, E,TB, VU,DU by CW at 12/30/2018  9:41 AM    Acceptance, E,TB, VU by CS at 12/29/2018 10:57 AM    Acceptance, E,TB, VU,DU by CW at 12/28/2018  9:01 AM    Acceptance, E,TB, VU by AK at  "12/28/2018  2:37 AM    Acceptance, TB,E, NR,DU by CW at 12/27/2018  9:43 AM    Acceptance, E, NR by EF at 12/26/2018  2:01 PM    Comment:  Dgt voiced understanding, pt needs reinforcement   Family Acceptance, E,TB, VU,DU by CW at 12/28/2018  9:01 AM    Acceptance, E, NR by  at 12/26/2018  2:01 PM    Comment:  Dgt voiced understanding, pt needs reinforcement                               User Key     Initials Effective Dates Name Provider Type Discipline    EF 06/08/18 -  Lela Espino, PT Physical Therapist PT    PC 04/03/18 -  Kemi Jones, PT Physical Therapist PT    CW 03/07/18 -  Freedom Jacobs, PTA Physical Therapy Assistant PT    AK 03/17/17 -  Stephenie Yuen, RN Registered Nurse Nurse     05/14/18 -  Gautam Garcia, PT Physical Therapist PT                PT Recommendation and Plan  Anticipated Discharge Disposition (PT): home with assist, skilled nursing facility  Therapy Frequency (PT Clinical Impression): daily  Outcome Summary/Treatment Plan (PT)  Anticipated Discharge Disposition (PT): home with assist, skilled nursing facility  Plan of Care Reviewed With: patient  Progress: declining  Outcome Summary: Pt agreeable to participate with PT but when she came to standing at side of the bed, became \"shaky\" & requested to go back to bed. Only able to take several sidesteps toward HOB.  Outcome Measures     Row Name 01/02/19 1700 12/31/18 1100          How much help from another person do you currently need...    Turning from your back to your side while in flat bed without using bedrails?  3  -EF  3  -PC     Moving from lying on back to sitting on the side of a flat bed without bedrails?  3  -EF  3  -PC     Moving to and from a bed to a chair (including a wheelchair)?  3  -EF  3  -PC     Standing up from a chair using your arms (e.g., wheelchair, bedside chair)?  3  -EF  3  -PC     Climbing 3-5 steps with a railing?  1  -EF  2  -PC     To walk in hospital room?  2  -EF  3  -PC     AM-PAC 6 " Clicks Score  15  -EF  17  -PC        Functional Assessment    Outcome Measure Options  AM-PAC 6 Clicks Basic Mobility (PT)  -EF  --       User Key  (r) = Recorded By, (t) = Taken By, (c) = Cosigned By    Initials Name Provider Type    Lela Suárez, PT Physical Therapist    PC Kemi Jones, PT Physical Therapist         Time Calculation:   PT Charges     Row Name 01/02/19 1738             Time Calculation    Start Time  1723  -EF      Stop Time  1732  -EF      Time Calculation (min)  9 min  -EF      PT Received On  01/02/19  -EF      PT - Next Appointment  01/03/19  -EF         Time Calculation- PT    Total Timed Code Minutes- PT  9 minute(s)  -EF        User Key  (r) = Recorded By, (t) = Taken By, (c) = Cosigned By    Initials Name Provider Type    Lela Suárez, PT Physical Therapist        Therapy Suggested Charges     Code   Minutes Charges    None           Therapy Charges for Today     Code Description Service Date Service Provider Modifiers Qty    96090725429 HC PT THER PROC EA 15 MIN 1/2/2019 Lela Espino, PT GP 1          PT G-Codes  Outcome Measure Options: AM-PAC 6 Clicks Basic Mobility (PT)  AM-PAC 6 Clicks Score: 15  Score: 12    Lela Espino, PT  1/2/2019

## 2019-01-02 NOTE — PROGRESS NOTES
Continued Stay Note  [facility]     Patient Name: Sujata Waters  MRN: [id number]  Today's Date: 1/2/2019    Admit Date: 12/24/2018    Discharge Plan     Row Name 01/02/19 1544       Plan    Plan  [facility], [facility]nd [facility]- pending    Patient/Family in Agreement with Plan  yes    Plan Comments  [facility] discussion with family regarding dc plan. Spoke with [facility], they are following but do not have LTC beds if needed, also checking Bed availablity at both [facility]. Pt states first choice is [facility]. CCP will follow. ashwin yoder/ccp        Discharge Codes    No documentation.             Allie Dueñas, RN

## 2019-01-02 NOTE — PROGRESS NOTES
[facility]  Nicola Solitario MD     LOS: 9 days   Patient Care Team:  Bernie Francois MD as PCP - General (Family Medicine)  Leyla Canada MD as Consulting Physician (Hematology and Oncology)  Douglas Long MD as Consulting Physician (Hematology and Oncology)        Subjective     Interval History:     Patient Complaints: Still not able to void.  No inguinal ligament better otherwise.    History taken from: patient chart    Review of Systems:    All systems were reviewed and were negative except for no chest pain.  No new shortness of air.  No extremity, joint, muscular skeletal, ENT, neurologic or skin symptoms.    Objective     Vital Signs  Temp:  [98 °F (36.7 °C)-98.4 °F (36.9 °C)] 98 °F (36.7 °C)  Heart Rate:  [60-65] 63  Resp:  [16-18] 16  BP: (110-135)/(58-72) 118/68    Physical Exam:   Physical Exam:     General Appearance:    Alert, cooperative, in no acute distress   Head:    Normocephalic, without obvious abnormality, atraumatic   Eyes:            Lids and lashes normal, conjunctivae and sclerae normal, no   icterus, no pallor, corneas clear, PERRLA   Ears:    Ears appear intact with no abnormalities noted   Throat:   No oral lesions, no thrush, oral mucosa moist   Neck:   No adenopathy, supple, trachea midline,   Back:     No kyphosis present, no scoliosis present, no skin lesions,       erythema or scars, no tenderness to percussion or                   palpation,   range of motion normal   Lungs:     Clear to auscultation,respirations regular, even and                   unlabored    Heart:    Regular rhythm and normal rate, normal S1 and S2, no            murmur, no gallop, no rub, no click   Breast Exam:    Deferred   Abdomen:     Normal bowel sounds, no masses, no organomegaly, soft        non-tender, non-distended, no guarding, no rebound                 tenderness   Genitalia:    Deferred   Extremities:   Moves all extremities well, no edema, no cyanosis, no               redness       Skin:   No bleeding, bruising or rash       Neurologic:   Cranial nerves 2 - 12 grossly intact, sensation intact,         Results Review:     I reviewed the patient's new clinical results.    Recent Results (from the past 24 hour(s))   Basic Metabolic Panel    Collection Time: 01/02/19  5:16 AM   Result Value Ref Range    Glucose 99 65 - 99 mg/dL    BUN 19 8 - 23 mg/dL    Creatinine 0.38 (L) 0.57 - 1.00 mg/dL    Sodium 130 (L) 136 - 145 mmol/L    Potassium 3.0 (L) 3.5 - 5.2 mmol/L    Chloride 91 (L) 98 - 107 mmol/L    CO2 26.1 22.0 - 29.0 mmol/L    Calcium 9.2 8.6 - 10.5 mg/dL    eGFR Non African Amer >150 >60 mL/min/1.73    BUN/Creatinine Ratio 50.0 (H) 7.0 - 25.0    Anion Gap 12.9 mmol/L   CBC Auto Differential    Collection Time: 01/02/19  5:16 AM   Result Value Ref Range    WBC 6.62 4.50 - 10.70 10*3/mm3    RBC 3.79 (L) 3.90 - 5.20 10*6/mm3    Hemoglobin 13.2 11.9 - 15.5 g/dL    Hematocrit 37.2 35.6 - 45.5 %    MCV 98.2 80.5 - 98.2 fL    MCH 34.8 (H) 26.9 - 32.0 pg    MCHC 35.5 32.4 - 36.3 g/dL    RDW 11.7 11.7 - 13.0 %    RDW-SD 42.1 37.0 - 54.0 fl    MPV 9.2 6.0 - 12.0 fL    Platelets 276 140 - 500 10*3/mm3    Neutrophil % 80.9 (H) 42.7 - 76.0 %    Lymphocyte % 15.7 (L) 19.6 - 45.3 %    Monocyte % 2.6 (L) 5.0 - 12.0 %    Eosinophil % 0.3 0.3 - 6.2 %    Basophil % 0.2 0.0 - 1.5 %    Immature Grans % 0.3 0.0 - 0.5 %    Neutrophils, Absolute 5.36 1.90 - 8.10 10*3/mm3    Lymphocytes, Absolute 1.04 0.90 - 4.80 10*3/mm3    Monocytes, Absolute 0.17 (L) 0.20 - 1.20 10*3/mm3    Eosinophils, Absolute 0.02 0.00 - 0.70 10*3/mm3    Basophils, Absolute 0.01 0.00 - 0.20 10*3/mm3    Immature Grans, Absolute 0.02 0.00 - 0.03 10*3/mm3       Medication Review:   Current Facility-Administered Medications:   •  aspirin tablet 325 mg, 325 mg, Oral, Daily, 325 mg at 01/01/19 0923 **OR** aspirin suppository 300 mg, 300 mg, Rectal, Daily, Jo Harp MD, 300 mg at 12/24/18 8572  •  atorvastatin (LIPITOR) tablet  20 mg, 20 mg, Oral, Nightly, Jo Harp MD, 20 mg at 01/01/19 2003  •  furosemide (LASIX) tablet 20 mg, 20 mg, Oral, BID, Kevin Faust MD, 20 mg at 01/01/19 2002  •  hydrocortisone 1 % cream 1 application, 1 application, Topical, TID PRN, Marsha Hameed MD  •  hydrOXYzine (VISTARIL) capsule 25 mg, 25 mg, Oral, TID PRN, Neftali Dowd MD, 25 mg at 12/27/18 1833  •  Magnesium Sulfate 2 gram Bolus, followed by 8 gram infusion (total Mg dose 10 grams)- Mg less than or equal to 1mg/dL, 2 g, Intravenous, PRN **OR** Magnesium Sulfate 2 gram / 50mL Infusion (GIVE X 3 BAGS TO EQUAL 6GM TOTAL DOSE) - Mg 1.1 - 1.5 mg/dl, 2 g, Intravenous, PRN, Last Rate: 25 mL/hr at 12/28/18 0531, 2 g at 12/28/18 0531 **OR** Magnesium Sulfate 4 gram infusion- Mg 1.6-1.9 mg/dL, 4 g, Intravenous, PRN, Kevin Faust MD  •  nicotine (NICODERM CQ) 21 MG/24HR patch 1 patch, 1 patch, Transdermal, Q24H, Neftali Dowd MD, Stopped at 12/27/18 2134  •  nitroglycerin (NITROSTAT) SL tablet 0.4 mg, 0.4 mg, Sublingual, Q5 Min PRN, Neftali Dowd MD  •  potassium chloride (KLOR-CON) packet 20 mEq, 20 mEq, Oral, BID, Kevin Faust MD, 20 mEq at 01/01/19 2004  •  potassium chloride (KLOR-CON) packet 40 mEq, 40 mEq, Oral, PRN, Neftali Dowd MD, 40 mEq at 12/29/18 1707  •  potassium chloride (MICRO-K) CR capsule 40 mEq, 40 mEq, Oral, PRN **OR** potassium chloride (KLOR-CON) packet 40 mEq, 40 mEq, Oral, PRN **OR** potassium chloride 10 mEq in 100 mL IVPB, 10 mEq, Intravenous, Q1H PRN, Neftali Dowd MD  •  potassium chloride (MICRO-K) CR capsule 40 mEq, 40 mEq, Oral, PRN, Neftali Dowd MD, 40 mEq at 12/25/18 2007  •  potassium chloride (MICRO-K) CR capsule 40 mEq, 40 mEq, Oral, Once, Kevin Faust MD  •  [COMPLETED] potassium chloride 10 mEq in 100 mL IVPB, 10 mEq, Intravenous, Once, Last Rate: 100 mL/hr at 12/24/18 2304, 10 mEq at 12/24/18 2304 **AND** [COMPLETED] potassium chloride 10 mEq in 100 mL IVPB, 10 mEq, Intravenous,  Once, Last Rate: 100 mL/hr at 12/25/18 0019, 10 mEq at 12/25/18 0019 **AND** [COMPLETED] potassium chloride 10 mEq in 100 mL IVPB, 10 mEq, Intravenous, Once, Last Rate: 100 mL/hr at 12/25/18 0130, 10 mEq at 12/25/18 0130 **AND** [COMPLETED] potassium chloride 10 mEq in 100 mL IVPB, 10 mEq, Intravenous, Once, Last Rate: 100 mL/hr at 12/25/18 0227, 10 mEq at 12/25/18 0227 **AND** [COMPLETED] potassium chloride 10 mEq in 100 mL IVPB, 10 mEq, Intravenous, Once, Last Rate: 100 mL/hr at 12/25/18 0421, 10 mEq at 12/25/18 0421 **AND** potassium chloride 10 mEq in 100 mL IVPB, 10 mEq, Intravenous, Once **AND** Potassium, , , PRN, Yuridia De La Cruz PA  •  potassium chloride 10 mEq in 100 mL IVPB, 10 mEq, Intravenous, Q1H PRN, Gary Jaime MD, Last Rate: 100 mL/hr at 12/26/18 0159, 10 mEq at 12/26/18 0159  •  sodium chloride 0.9 % flush 10 mL, 10 mL, Intravenous, PRN, Yuridia De La Cruz PA  •  sodium chloride 0.9 % flush 3 mL, 3 mL, Intravenous, Q12H, Jo Harp MD, 3 mL at 01/01/19 2004  •  sodium chloride 0.9 % flush 3-10 mL, 3-10 mL, Intravenous, PRN, Jo Harp MD  •  zolpidem (AMBIEN) tablet 5 mg, 5 mg, Oral, Nightly PRN, Neftali Dowd MD    Assessment/Plan       Altered mental status    Metastatic cancer (CMS/HCC)    Small cell lung cancer (CMS/HCC)    Hyponatremia    Urine retention          Plan for disposition:Discussed learning self catheter.  She seems willing.    Nicola Solitario MD  01/02/19  8:29 AM      Time: 25-reviewing records, discussion of plans.

## 2019-01-02 NOTE — PLAN OF CARE
"Problem: Patient Care Overview  Goal: Plan of Care Review  Outcome: Ongoing (interventions implemented as appropriate)   01/02/19 8238   Coping/Psychosocial   Plan of Care Reviewed With patient   Plan of Care Review   Progress declining   OTHER   Outcome Summary Pt agreeable to participate with PT but when she came to standing at side of the bed, became \"shaky\" & requested to go back to bed. Only able to take several sidesteps toward HOB.         "

## 2019-01-02 NOTE — PROGRESS NOTES
"DAILY PROGRESS NOTE  [facility]    Patient Identification:  Name: Sujata Waters  Age: 63 y.o.  Sex: female  :  1955  MRN: [id number]         Primary Care Physician: Bernie Francois MD    Subjective:  Interval History:She feels better today.  No new complaints.     Objective:    Scheduled Meds:    aspirin 325 mg Oral Daily   Or      aspirin 300 mg Rectal Daily   atorvastatin 20 mg Oral Nightly   furosemide 20 mg Oral BID   nicotine 1 patch Transdermal Q24H   potassium chloride 40 mEq Oral BID   potassium chloride 40 mEq Oral Once   potassium chloride 10 mEq Intravenous Once   sodium chloride 3 mL Intravenous Q12H     Continuous Infusions:     Vital signs in last 24 hours:  Temp:  [98 °F (36.7 °C)-98.4 °F (36.9 °C)] 98 °F (36.7 °C)  Heart Rate:  [60-65] 63  Resp:  [16-18] 16  BP: (110-135)/(58-72) 118/68    Intake/Output:    Intake/Output Summary (Last 24 hours) at 2019 1336  Last data filed at 2019 1149  Gross per 24 hour   Intake 600 ml   Output 850 ml   Net -250 ml       Exam:  /68 (BP Location: Right arm, Patient Position: Lying)   Pulse 63   Temp 98 °F (36.7 °C) (Oral)   Resp 16   Ht 162.6 cm (64\")   Wt 34.1 kg (75 lb 3.2 oz)   SpO2 96%   BMI 12.91 kg/m²     General Appearance:    Alert, cooperative, no distress   Head:    Normocephalic, without obvious abnormality, atraumatic   Eyes:       Throat:   Lips, tongue, gums normal   Neck:   Supple, symmetrical, trachea midline, no JVD   Lungs:     Clear to auscultation bilaterally, respirations unlabored   Chest Wall:    No tenderness or deformity    Heart:    Regular rate and rhythm, S1 and S2 normal, no murmur,no  Rub or gallop   Abdomen:     Soft, non-tender, bowel sounds active, no masses, no organomegaly    Extremities:   Extremities normal, atraumatic, no cyanosis or edema   Pulses:      Skin:   Skin is warm and dry,  no rashes or palpable lesions   Neurologic:   no focal deficits noted      Lab " Results (last 72 hours)     Procedure Component Value Units Date/Time    Sodium [[id number]]  (Abnormal) Collected:  12/25/18 1423    Specimen:  Blood Updated:  12/25/18 1445     Sodium 130 mmol/L     Potassium [[id number]]  (Abnormal) Collected:  12/25/18 1423    Specimen:  Blood Updated:  12/25/18 1445     Potassium 3.1 mmol/L     Lipid Panel [[id number]] Collected:  12/25/18 0639    Specimen:  Blood Updated:  12/25/18 0851     Total Cholesterol 141 mg/dL      Triglycerides 61 mg/dL      HDL Cholesterol 57 mg/dL      LDL Cholesterol  72 mg/dL      VLDL Cholesterol 12.2 mg/dL      LDL/HDL Ratio 1.26    Narrative:       Cholesterol Reference Ranges  (U.S. Department of Health and Human Services ATP III Classifications)    Desirable          <200 mg/dL  Borderline High    200-239 mg/dL  High Risk          >240 mg/dL      Triglyceride Reference Ranges  (U.S. Department of Health and Human Services ATP III Classifications)    Normal           <150 mg/dL  Borderline High  150-199 mg/dL  High             200-499 mg/dL  Very High        >500 mg/dL    HDL Reference Ranges  (U.S. Department of Health and Human Services ATP III Classifcations)    Low     <40 mg/dl (major risk factor for CHD)  High    >60 mg/dl ('negative' risk factor for CHD)        LDL Reference Ranges  (U.S. Department of Health and Human Services ATP III Classifcations)    Optimal          <100 mg/dL  Near Optimal     100-129 mg/dL  Borderline High  130-159 mg/dL  High             160-189 mg/dL  Very High        >189 mg/dL    Sodium [[id number]]  (Abnormal) Collected:  12/25/18 0639    Specimen:  Blood Updated:  12/25/18 0851     Sodium 129 mmol/L     Hemoglobin A1c [[id number]]  (Normal) Collected:  12/25/18 0639    Specimen:  Blood Updated:  12/25/18 0842     Hemoglobin A1C 4.90 %     Narrative:       Hemoglobin A1C Ranges:    Increased Risk for Diabetes  5.7% to 6.4%  Diabetes                     >= 6.5%  Diabetic Goal                < 7.0%    Urinalysis  With Culture If Indicated - Urine, Catheter In/Out [[id number]]  (Abnormal) Collected:  12/25/18 0652    Specimen:  Urine, Catheter In/Out Updated:  12/25/18 0837     Color, UA Yellow     Appearance, UA Clear     pH, UA 6.0     Specific Gravity, UA >=1.030     Glucose, UA Negative     Ketones, UA 80 mg/dL (3+)     Bilirubin, UA Negative     Blood, UA Negative     Protein, UA Negative     Leuk Esterase, UA Negative     Nitrite, UA Negative     Urobilinogen, UA 1.0 E.U./dL    Narrative:       Urine microscopic not indicated.    Osmolality, Urine - Urine, Catheter In/Out [[id number]] Collected:  12/25/18 0652    Specimen:  Urine, Catheter In/Out Updated:  12/25/18 0834     Osmolality, Urine 485 mOsm/kg     Narrative:       Osmo Normal Reference Ranges:    Random:  mOsm/kg H2O, depending on fluid intake.  Random: >850 mOsm/kg H20, after 12 hour fluid restriction.    24 Hour: 300-900 mOsm/kg H2O.    POC Glucose Once [[id number]]  (Normal) Collected:  12/25/18 0624    Specimen:  Blood Updated:  12/25/18 0626     Glucose 80 mg/dL     Sodium [[id number]]  (Abnormal) Collected:  12/24/18 2349    Specimen:  Blood Updated:  12/25/18 0040     Sodium 128 mmol/L     Cortisol [[id number]]  (Normal) Collected:  12/24/18 2130    Specimen:  Blood Updated:  12/24/18 2243     Cortisol 28.50 mcg/dL     Narrative:       Cortisol Reference Ranges:    Cortisol 6AM - 10AM Range: 6.02-18.40 mcg/dl  Cortisol 4PM - 8PM Range: 2.68-10.50 mcg/dl  Critical AM/PM:    Less than 2 mcg/dl    Sodium [[id number]]  (Abnormal) Collected:  12/24/18 2130    Specimen:  Blood Updated:  12/24/18 2201     Sodium 127 mmol/L     Sodium, Urine, Random - Urine, Catheter [[id number]] Collected:  12/24/18 1717    Specimen:  Urine, Catheter Updated:  12/24/18 1922     Sodium, Urine 24 mmol/L     Narrative:       Reference intervals for random urine have not been established.  Clinical usage is dependent upon physician's interpretation in combination with other  laboratory tests.     Osmolality, Serum [[id number]]  (Abnormal) Collected:  12/24/18 1624    Specimen:  Blood Updated:  12/24/18 1901     Osmolality 263 mOsm/kg     CBC & Differential [[id number]] Collected:  12/24/18 1624    Specimen:  Blood Updated:  12/24/18 1750    Narrative:       The following orders were created for panel order CBC & Differential.  Procedure                               Abnormality         Status                     ---------                               -----------         ------                     Scan Slide[[id number]]                   Normal              Final result               CBC Auto Differential[[id number]]        Abnormal            Final result                 Please view results for these tests on the individual orders.    Scan Slide [[id number]]  (Normal) Collected:  12/24/18 1624    Specimen:  Blood Updated:  12/24/18 1750     RBC Morphology Normal     WBC Morphology Normal     Platelet Morphology Normal    CBC Auto Differential [[id number]]  (Abnormal) Collected:  12/24/18 1624    Specimen:  Blood Updated:  12/24/18 1750     WBC 9.73 10*3/mm3      RBC 4.16 10*6/mm3      Hemoglobin 14.0 g/dL      Comment: ESTIMATED HGB        Hematocrit 42.0 %      Comment: SPUN HCT        .9 fL      MCH 33.7 pg      MCHC 33.3 g/dL      RDW 11.9 %      RDW-SD 40.0 fl      MPV 9.3 fL      Platelets 283 10*3/mm3      Neutrophil % 83.2 %      Lymphocyte % 16.0 %      Monocyte % 0.6 %      Eosinophil % 0.0 %      Basophil % 0.0 %      Immature Grans % 0.2 %      Neutrophils, Absolute 8.09 10*3/mm3      Lymphocytes, Absolute 1.56 10*3/mm3      Monocytes, Absolute 0.06 10*3/mm3      Eosinophils, Absolute 0.00 10*3/mm3      Basophils, Absolute 0.00 10*3/mm3      Immature Grans, Absolute 0.02 10*3/mm3     TSH [[id number]]  (Normal) Collected:  12/24/18 1624    Specimen:  Blood Updated:  12/24/18 1748     TSH 0.659 mIU/mL     [place] Draw [[id number]] Collected:  12/24/18 1624    Specimen:  Blood  Updated:  12/24/18 1745    Narrative:       The following orders were created for panel order [place] Draw.  Procedure                               Abnormality         Status                     ---------                               -----------         ------                     Light Blue Top[[id number]]                                   Final result               Green Top (Gel)[[id number]]                                  Final result               Lavender Top[[id number]]                                     Final result               Gold Top - SST[[id number]]                                   Final result                 Please view results for these tests on the individual orders.    Lavender Top [[id number]] Collected:  12/24/18 1624    Specimen:  Blood Updated:  12/24/18 1745     Extra Tube hold for add-on     Comment: Auto resulted       Urinalysis With Microscopic If Indicated (No Culture) - Urine, Catheter [[id number]]  (Abnormal) Collected:  12/24/18 1717    Specimen:  Urine, Catheter Updated:  12/24/18 1736     Color, UA Yellow     Appearance, UA Clear     pH, UA 6.5     Specific Gravity, UA 1.015     Glucose, UA Negative     Ketones, UA 40 mg/dL (2+)     Bilirubin, UA Negative     Blood, UA Negative     Protein, UA Negative     Leuk Esterase, UA Negative     Nitrite, UA Negative     Urobilinogen, UA 0.2 E.U./dL    Narrative:       Urine microscopic not indicated.    Light Blue Top [[id number]] Collected:  12/24/18 1624    Specimen:  Blood Updated:  12/24/18 1730     Extra Tube hold for add-on     Comment: Auto resulted       Gold Top - SST [[id number]] Collected:  12/24/18 1624    Specimen:  Blood Updated:  12/24/18 1730     Extra Tube Hold for add-ons.     Comment: Auto resulted.       Green Top (Gel) [[id number]] Collected:  12/24/18 1624    Specimen:  Blood Updated:  12/24/18 1730     Extra Tube Hold for add-ons.     Comment: Auto resulted.       Vitamin B12 [[id number]]  (Abnormal) Collected:  12/24/18  1624    Specimen:  Blood Updated:  12/24/18 1717     Vitamin B-12 1,655 pg/mL     Comprehensive Metabolic Panel [[id number]]  (Abnormal) Collected:  12/24/18 1624    Specimen:  Blood Updated:  12/24/18 1656     Glucose 90 mg/dL      BUN 21 mg/dL      Creatinine 0.62 mg/dL      Sodium 123 mmol/L      Potassium 3.1 mmol/L      Chloride 82 mmol/L      CO2 22.8 mmol/L      Calcium 9.8 mg/dL      Total Protein 7.2 g/dL      Albumin 4.30 g/dL      ALT (SGPT) 14 U/L      AST (SGOT) 25 U/L      Alkaline Phosphatase 61 U/L      Total Bilirubin 0.9 mg/dL      eGFR Non African Amer 97 mL/min/1.73      Globulin 2.9 gm/dL      A/G Ratio 1.5 g/dL      BUN/Creatinine Ratio 33.9     Anion Gap 18.2 mmol/L     Troponin [[id number]]  (Normal) Collected:  12/24/18 1624    Specimen:  Blood Updated:  12/24/18 1656     Troponin T <0.010 ng/mL     Narrative:       Troponin T Reference Ranges:  Less than 0.03 ng/mL:    Negative for AMI  0.03 to 0.09 ng/mL:      Indeterminant for AMI  Greater than 0.09 ng/mL: Positive for AMI    Protime-INR [[id number]]  (Normal) Collected:  12/24/18 1624    Specimen:  Blood Updated:  12/24/18 1647     Protime 13.5 Seconds      INR 1.05        Data Review:  Results from last 7 days   Lab Units  01/02/19   0516  01/01/19   0657  12/31/18   0524   SODIUM mmol/L  130*  129*  130*   POTASSIUM mmol/L  3.0*  3.7  3.8   CHLORIDE mmol/L  91*  91*  89*   CO2 mmol/L  26.1  24.4  29.2*   BUN mg/dL  19  20  18   CREATININE mg/dL  0.38*  0.39*  0.41*   GLUCOSE mg/dL  99  96  92   CALCIUM mg/dL  9.2  9.2  9.8     Results from last 7 days   Lab Units  01/02/19   0516  01/01/19   0657  12/31/18   0524   WBC 10*3/mm3  6.62  7.36  7.63   HEMOGLOBIN g/dL  13.2  13.1  13.1   HEMATOCRIT %  37.2  37.4  36.1   PLATELETS 10*3/mm3  276  283  290             Lab Results   Lab Value Date/Time    TROPONINT <0.010 12/24/2018 1624               Invalid input(s): PROT, LABALBU          No results found for: POCGLU        Past Medical  History:   Diagnosis Date   • Anxiety    • Basal cell carcinoma     Follows up with Dermatology annually, PA with Dr. Antoine's office   • Bowen's disease of vulva    • Depression    • History of kidney stone 2018   • Left adrenal mass (CMS/HCC)    • Lung cancer (CMS/HCC)     Right lung with metastasis to neck node.   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       Plan:  nephrology consult noted. Neurology consult noted and oncology. Correct sodium.   urology consult and intermitant cath for urine retention. Fluid restriction. Follow up labs.    PT eval. Encourage to work with PT. Will need SNU for rehab.  She is too weak to go home.  DC planning.    Neftali Dowd MD  1/2/2019  1:36 PM

## 2019-01-03 ENCOUNTER — APPOINTMENT (OUTPATIENT)
Dept: ONCOLOGY | Facility: HOSPITAL | Age: 64
End: 2019-01-03

## 2019-01-03 ENCOUNTER — APPOINTMENT (OUTPATIENT)
Dept: ONCOLOGY | Facility: CLINIC | Age: 64
End: 2019-01-03

## 2019-01-03 ENCOUNTER — DOCUMENTATION (OUTPATIENT)
Dept: ONCOLOGY | Facility: CLINIC | Age: 64
End: 2019-01-03

## 2019-01-03 LAB
ANION GAP SERPL CALCULATED.3IONS-SCNC: 11.2 MMOL/L
BASOPHILS # BLD AUTO: 0.01 10*3/MM3 (ref 0–0.2)
BASOPHILS NFR BLD AUTO: 0.1 % (ref 0–1.5)
BUN BLD-MCNC: 19 MG/DL (ref 8–23)
BUN/CREAT SERPL: 48.7 (ref 7–25)
CALCIUM SPEC-SCNC: 9.4 MG/DL (ref 8.6–10.5)
CHLORIDE SERPL-SCNC: 93 MMOL/L (ref 98–107)
CO2 SERPL-SCNC: 28.8 MMOL/L (ref 22–29)
CREAT BLD-MCNC: 0.39 MG/DL (ref 0.57–1)
DEPRECATED RDW RBC AUTO: 40.3 FL (ref 37–54)
EOSINOPHIL # BLD AUTO: 0.02 10*3/MM3 (ref 0–0.7)
EOSINOPHIL NFR BLD AUTO: 0.2 % (ref 0.3–6.2)
ERYTHROCYTE [DISTWIDTH] IN BLOOD BY AUTOMATED COUNT: 12 % (ref 11.7–13)
GFR SERPL CREATININE-BSD FRML MDRD: >150 ML/MIN/1.73
GLUCOSE BLD-MCNC: 112 MG/DL (ref 65–99)
HCT VFR BLD AUTO: 36.7 % (ref 35.6–45.5)
HGB BLD-MCNC: 13.3 G/DL (ref 11.9–15.5)
IMM GRANULOCYTES # BLD AUTO: 0.04 10*3/MM3 (ref 0–0.03)
IMM GRANULOCYTES NFR BLD AUTO: 0.4 % (ref 0–0.5)
LYMPHOCYTES # BLD AUTO: 1.31 10*3/MM3 (ref 0.9–4.8)
LYMPHOCYTES NFR BLD AUTO: 12.2 % (ref 19.6–45.3)
MCH RBC QN AUTO: 34.2 PG (ref 26.9–32)
MCHC RBC AUTO-ENTMCNC: 36.2 G/DL (ref 32.4–36.3)
MCV RBC AUTO: 94.3 FL (ref 80.5–98.2)
MONOCYTES # BLD AUTO: 0.33 10*3/MM3 (ref 0.2–1.2)
MONOCYTES NFR BLD AUTO: 3.1 % (ref 5–12)
NEUTROPHILS # BLD AUTO: 9.04 10*3/MM3 (ref 1.9–8.1)
NEUTROPHILS NFR BLD AUTO: 84.4 % (ref 42.7–76)
PLATELET # BLD AUTO: 302 10*3/MM3 (ref 140–500)
PMV BLD AUTO: 9.6 FL (ref 6–12)
POTASSIUM BLD-SCNC: 3.9 MMOL/L (ref 3.5–5.2)
POTASSIUM BLD-SCNC: 4.4 MMOL/L (ref 3.5–5.2)
RBC # BLD AUTO: 3.89 10*6/MM3 (ref 3.9–5.2)
SODIUM BLD-SCNC: 133 MMOL/L (ref 136–145)
WBC NRBC COR # BLD: 10.71 10*3/MM3 (ref 4.5–10.7)

## 2019-01-03 PROCEDURE — 97530 THERAPEUTIC ACTIVITIES: CPT

## 2019-01-03 PROCEDURE — 85025 COMPLETE CBC W/AUTO DIFF WBC: CPT | Performed by: HOSPITALIST

## 2019-01-03 PROCEDURE — 97110 THERAPEUTIC EXERCISES: CPT

## 2019-01-03 PROCEDURE — 80048 BASIC METABOLIC PNL TOTAL CA: CPT | Performed by: HOSPITALIST

## 2019-01-03 RX ADMIN — POTASSIUM CHLORIDE 40 MEQ: 1.5 POWDER, FOR SOLUTION ORAL at 22:51

## 2019-01-03 RX ADMIN — FUROSEMIDE 20 MG: 20 TABLET ORAL at 10:04

## 2019-01-03 RX ADMIN — POTASSIUM CHLORIDE 40 MEQ: 1.5 POWDER, FOR SOLUTION ORAL at 10:04

## 2019-01-03 RX ADMIN — ASPIRIN 325 MG: 325 TABLET ORAL at 10:03

## 2019-01-03 RX ADMIN — FUROSEMIDE 20 MG: 20 TABLET ORAL at 19:04

## 2019-01-03 RX ADMIN — SODIUM CHLORIDE, PRESERVATIVE FREE 3 ML: 5 INJECTION INTRAVENOUS at 22:52

## 2019-01-03 RX ADMIN — SODIUM CHLORIDE, PRESERVATIVE FREE 3 ML: 5 INJECTION INTRAVENOUS at 10:04

## 2019-01-03 RX ADMIN — ATORVASTATIN CALCIUM 20 MG: 20 TABLET, FILM COATED ORAL at 22:55

## 2019-01-03 NOTE — PLAN OF CARE
Problem: Patient Care Overview  Goal: Plan of Care Review  Outcome: Ongoing (interventions implemented as appropriate)   01/03/19 0622   Coping/Psychosocial   Plan of Care Reviewed With patient   Plan of Care Review   Progress improving   OTHER   Outcome Summary PT WAS VERY RESTLESS OVERNIGHT. HAD A BREIF EPISODE OF VISUAL HALLUCINATION, BUT WAS ALERT AND ORIENTED x4. DOSE OF AMBIEN WAS NOT EFFECTIVE. DID NOT FALL ASLEEP UNTIL AFTER 4AM.DEMONSTRATED URINARY RETENTION AND WAS IN AND OUT CATHED. PT TOLERATED WELL. WILL CONTINUE TO MONITOR.      Goal: Individualization and Mutuality  Outcome: Ongoing (interventions implemented as appropriate)    Goal: Discharge Needs Assessment  Outcome: Ongoing (interventions implemented as appropriate)    Goal: Interprofessional Rounds/Family Conf  Outcome: Ongoing (interventions implemented as appropriate)      Problem: Skin Injury Risk (Adult)  Goal: Skin Health and Integrity  Outcome: Ongoing (interventions implemented as appropriate)      Problem: Fall Risk (Adult)  Goal: Absence of Fall  Outcome: Ongoing (interventions implemented as appropriate)      Problem: Nutrition, Imbalanced: Inadequate Oral Intake (Adult)  Goal: Identify Related Risk Factors and Signs and Symptoms  Outcome: Ongoing (interventions implemented as appropriate)    Goal: Improved Oral Intake  Outcome: Ongoing (interventions implemented as appropriate)    Goal: Prevent Further Weight Loss  Outcome: Ongoing (interventions implemented as appropriate)

## 2019-01-03 NOTE — PROGRESS NOTES
"DAILY PROGRESS NOTE  [facility]    Patient Identification:  Name: Sujata Waters  Age: 63 y.o.  Sex: female  :  1955  MRN: [id number]         Primary Care Physician: Bernie Francois MD    Subjective:  Interval History:She feels better today.  No new complaints.     Objective:    Scheduled Meds:    aspirin 325 mg Oral Daily   Or      aspirin 300 mg Rectal Daily   atorvastatin 20 mg Oral Nightly   furosemide 20 mg Oral BID   nicotine 1 patch Transdermal Q24H   potassium chloride 40 mEq Oral BID   potassium chloride 40 mEq Oral Once   potassium chloride 10 mEq Intravenous Once   sodium chloride 3 mL Intravenous Q12H     Continuous Infusions:     Vital signs in last 24 hours:  Temp:  [98.1 °F (36.7 °C)-98.5 °F (36.9 °C)] 98.5 °F (36.9 °C)  Heart Rate:  [67-80] 69  Resp:  [16-18] 18  BP: (107-173)/(68-93) 129/75    Intake/Output:    Intake/Output Summary (Last 24 hours) at 1/3/2019 1404  Last data filed at 1/3/2019 0439  Gross per 24 hour   Intake 180 ml   Output 1050 ml   Net -870 ml       Exam:  /75 (BP Location: Right arm, Patient Position: Lying)   Pulse 69   Temp 98.5 °F (36.9 °C) (Oral)   Resp 18   Ht 162.6 cm (64\")   Wt 34.5 kg (76 lb 0.9 oz)   SpO2 98%   BMI 13.06 kg/m²     General Appearance:    Alert, cooperative, no distress   Head:    Normocephalic, without obvious abnormality, atraumatic   Eyes:       Throat:   Lips, tongue, gums normal   Neck:   Supple, symmetrical, trachea midline, no JVD   Lungs:     Clear to auscultation bilaterally, respirations unlabored   Chest Wall:    No tenderness or deformity    Heart:    Regular rate and rhythm, S1 and S2 normal, no murmur,no  Rub or gallop   Abdomen:     Soft, non-tender, bowel sounds active, no masses, no organomegaly    Extremities:   Extremities normal, atraumatic, no cyanosis or edema   Pulses:      Skin:   Skin is warm and dry,  no rashes or palpable lesions   Neurologic:   no focal deficits noted    "   Lab Results (last 72 hours)     Procedure Component Value Units Date/Time    Sodium [[id number]]  (Abnormal) Collected:  12/25/18 1423    Specimen:  Blood Updated:  12/25/18 1445     Sodium 130 mmol/L     Potassium [[id number]]  (Abnormal) Collected:  12/25/18 1423    Specimen:  Blood Updated:  12/25/18 1445     Potassium 3.1 mmol/L     Lipid Panel [[id number]] Collected:  12/25/18 0639    Specimen:  Blood Updated:  12/25/18 0851     Total Cholesterol 141 mg/dL      Triglycerides 61 mg/dL      HDL Cholesterol 57 mg/dL      LDL Cholesterol  72 mg/dL      VLDL Cholesterol 12.2 mg/dL      LDL/HDL Ratio 1.26    Narrative:       Cholesterol Reference Ranges  (U.S. Department of Health and Human Services ATP III Classifications)    Desirable          <200 mg/dL  Borderline High    200-239 mg/dL  High Risk          >240 mg/dL      Triglyceride Reference Ranges  (U.S. Department of Health and Human Services ATP III Classifications)    Normal           <150 mg/dL  Borderline High  150-199 mg/dL  High             200-499 mg/dL  Very High        >500 mg/dL    HDL Reference Ranges  (U.S. Department of Health and Human Services ATP III Classifcations)    Low     <40 mg/dl (major risk factor for CHD)  High    >60 mg/dl ('negative' risk factor for CHD)        LDL Reference Ranges  (U.S. Department of Health and Human Services ATP III Classifcations)    Optimal          <100 mg/dL  Near Optimal     100-129 mg/dL  Borderline High  130-159 mg/dL  High             160-189 mg/dL  Very High        >189 mg/dL    Sodium [[id number]]  (Abnormal) Collected:  12/25/18 0639    Specimen:  Blood Updated:  12/25/18 0851     Sodium 129 mmol/L     Hemoglobin A1c [[id number]]  (Normal) Collected:  12/25/18 0639    Specimen:  Blood Updated:  12/25/18 0842     Hemoglobin A1C 4.90 %     Narrative:       Hemoglobin A1C Ranges:    Increased Risk for Diabetes  5.7% to 6.4%  Diabetes                     >= 6.5%  Diabetic Goal                < 7.0%     Urinalysis With Culture If Indicated - Urine, Catheter In/Out [[id number]]  (Abnormal) Collected:  12/25/18 0652    Specimen:  Urine, Catheter In/Out Updated:  12/25/18 0837     Color, UA Yellow     Appearance, UA Clear     pH, UA 6.0     Specific Gravity, UA >=1.030     Glucose, UA Negative     Ketones, UA 80 mg/dL (3+)     Bilirubin, UA Negative     Blood, UA Negative     Protein, UA Negative     Leuk Esterase, UA Negative     Nitrite, UA Negative     Urobilinogen, UA 1.0 E.U./dL    Narrative:       Urine microscopic not indicated.    Osmolality, Urine - Urine, Catheter In/Out [[id number]] Collected:  12/25/18 0652    Specimen:  Urine, Catheter In/Out Updated:  12/25/18 0834     Osmolality, Urine 485 mOsm/kg     Narrative:       Osmo Normal Reference Ranges:    Random:  mOsm/kg H2O, depending on fluid intake.  Random: >850 mOsm/kg H20, after 12 hour fluid restriction.    24 Hour: 300-900 mOsm/kg H2O.    POC Glucose Once [[id number]]  (Normal) Collected:  12/25/18 0624    Specimen:  Blood Updated:  12/25/18 0626     Glucose 80 mg/dL     Sodium [[id number]]  (Abnormal) Collected:  12/24/18 2349    Specimen:  Blood Updated:  12/25/18 0040     Sodium 128 mmol/L     Cortisol [[id number]]  (Normal) Collected:  12/24/18 2130    Specimen:  Blood Updated:  12/24/18 2243     Cortisol 28.50 mcg/dL     Narrative:       Cortisol Reference Ranges:    Cortisol 6AM - 10AM Range: 6.02-18.40 mcg/dl  Cortisol 4PM - 8PM Range: 2.68-10.50 mcg/dl  Critical AM/PM:    Less than 2 mcg/dl    Sodium [[id number]]  (Abnormal) Collected:  12/24/18 2130    Specimen:  Blood Updated:  12/24/18 2201     Sodium 127 mmol/L     Sodium, Urine, Random - Urine, Catheter [[id number]] Collected:  12/24/18 1717    Specimen:  Urine, Catheter Updated:  12/24/18 1922     Sodium, Urine 24 mmol/L     Narrative:       Reference intervals for random urine have not been established.  Clinical usage is dependent upon physician's interpretation in combination with  other laboratory tests.     Osmolality, Serum [[id number]]  (Abnormal) Collected:  12/24/18 1624    Specimen:  Blood Updated:  12/24/18 1901     Osmolality 263 mOsm/kg     CBC & Differential [[id number]] Collected:  12/24/18 1624    Specimen:  Blood Updated:  12/24/18 1750    Narrative:       The following orders were created for panel order CBC & Differential.  Procedure                               Abnormality         Status                     ---------                               -----------         ------                     Scan Slide[[id number]]                   Normal              Final result               CBC Auto Differential[[id number]]        Abnormal            Final result                 Please view results for these tests on the individual orders.    Scan Slide [[id number]]  (Normal) Collected:  12/24/18 1624    Specimen:  Blood Updated:  12/24/18 1750     RBC Morphology Normal     WBC Morphology Normal     Platelet Morphology Normal    CBC Auto Differential [[id number]]  (Abnormal) Collected:  12/24/18 1624    Specimen:  Blood Updated:  12/24/18 1750     WBC 9.73 10*3/mm3      RBC 4.16 10*6/mm3      Hemoglobin 14.0 g/dL      Comment: ESTIMATED HGB        Hematocrit 42.0 %      Comment: SPUN HCT        .9 fL      MCH 33.7 pg      MCHC 33.3 g/dL      RDW 11.9 %      RDW-SD 40.0 fl      MPV 9.3 fL      Platelets 283 10*3/mm3      Neutrophil % 83.2 %      Lymphocyte % 16.0 %      Monocyte % 0.6 %      Eosinophil % 0.0 %      Basophil % 0.0 %      Immature Grans % 0.2 %      Neutrophils, Absolute 8.09 10*3/mm3      Lymphocytes, Absolute 1.56 10*3/mm3      Monocytes, Absolute 0.06 10*3/mm3      Eosinophils, Absolute 0.00 10*3/mm3      Basophils, Absolute 0.00 10*3/mm3      Immature Grans, Absolute 0.02 10*3/mm3     TSH [[id number]]  (Normal) Collected:  12/24/18 1624    Specimen:  Blood Updated:  12/24/18 1748     TSH 0.659 mIU/mL     [place] Draw [[id number]] Collected:  12/24/18 1624    Specimen:   Blood Updated:  12/24/18 1745    Narrative:       The following orders were created for panel order [place] Draw.  Procedure                               Abnormality         Status                     ---------                               -----------         ------                     Light Blue Top[[id number]]                                   Final result               Green Top (Gel)[[id number]]                                  Final result               Lavender Top[[id number]]                                     Final result               Gold Top - SST[[id number]]                                   Final result                 Please view results for these tests on the individual orders.    Lavender Top [[id number]] Collected:  12/24/18 1624    Specimen:  Blood Updated:  12/24/18 1745     Extra Tube hold for add-on     Comment: Auto resulted       Urinalysis With Microscopic If Indicated (No Culture) - Urine, Catheter [[id number]]  (Abnormal) Collected:  12/24/18 1717    Specimen:  Urine, Catheter Updated:  12/24/18 1736     Color, UA Yellow     Appearance, UA Clear     pH, UA 6.5     Specific Gravity, UA 1.015     Glucose, UA Negative     Ketones, UA 40 mg/dL (2+)     Bilirubin, UA Negative     Blood, UA Negative     Protein, UA Negative     Leuk Esterase, UA Negative     Nitrite, UA Negative     Urobilinogen, UA 0.2 E.U./dL    Narrative:       Urine microscopic not indicated.    Light Blue Top [[id number]] Collected:  12/24/18 1624    Specimen:  Blood Updated:  12/24/18 1730     Extra Tube hold for add-on     Comment: Auto resulted       Gold Top - SST [[id number]] Collected:  12/24/18 1624    Specimen:  Blood Updated:  12/24/18 1730     Extra Tube Hold for add-ons.     Comment: Auto resulted.       Green Top (Gel) [[id number]] Collected:  12/24/18 1624    Specimen:  Blood Updated:  12/24/18 1730     Extra Tube Hold for add-ons.     Comment: Auto resulted.       Vitamin B12 [[id number]]  (Abnormal) Collected:   12/24/18 1624    Specimen:  Blood Updated:  12/24/18 1717     Vitamin B-12 1,655 pg/mL     Comprehensive Metabolic Panel [[id number]]  (Abnormal) Collected:  12/24/18 1624    Specimen:  Blood Updated:  12/24/18 1656     Glucose 90 mg/dL      BUN 21 mg/dL      Creatinine 0.62 mg/dL      Sodium 123 mmol/L      Potassium 3.1 mmol/L      Chloride 82 mmol/L      CO2 22.8 mmol/L      Calcium 9.8 mg/dL      Total Protein 7.2 g/dL      Albumin 4.30 g/dL      ALT (SGPT) 14 U/L      AST (SGOT) 25 U/L      Alkaline Phosphatase 61 U/L      Total Bilirubin 0.9 mg/dL      eGFR Non African Amer 97 mL/min/1.73      Globulin 2.9 gm/dL      A/G Ratio 1.5 g/dL      BUN/Creatinine Ratio 33.9     Anion Gap 18.2 mmol/L     Troponin [[id number]]  (Normal) Collected:  12/24/18 1624    Specimen:  Blood Updated:  12/24/18 1656     Troponin T <0.010 ng/mL     Narrative:       Troponin T Reference Ranges:  Less than 0.03 ng/mL:    Negative for AMI  0.03 to 0.09 ng/mL:      Indeterminant for AMI  Greater than 0.09 ng/mL: Positive for AMI    Protime-INR [[id number]]  (Normal) Collected:  12/24/18 1624    Specimen:  Blood Updated:  12/24/18 1647     Protime 13.5 Seconds      INR 1.05        Data Review:  Results from last 7 days   Lab Units  01/03/19 0617 01/02/19 2328 01/02/19 0516 01/01/19   0657   SODIUM mmol/L  133*   --   130*  129*   POTASSIUM mmol/L  3.9  4.4  3.0*  3.7   CHLORIDE mmol/L  93*   --   91*  91*   CO2 mmol/L  28.8   --   26.1  24.4   BUN mg/dL  19 -- 19  20   CREATININE mg/dL  0.39*   --   0.38*  0.39*   GLUCOSE mg/dL  112*   --   99  96   CALCIUM mg/dL  9.4   --   9.2  9.2     Results from last 7 days   Lab Units  01/03/19 0617 01/02/19   0516  01/01/19   0657   WBC 10*3/mm3  10.71*  6.62  7.36   HEMOGLOBIN g/dL  13.3  13.2  13.1   HEMATOCRIT %  36.7  37.2  37.4   PLATELETS 10*3/mm3  302  276  283             Lab Results   Lab Value Date/Time    TROPONINT <0.010 12/24/2018 1624               Invalid input(s):  PROT, LABALBU          No results found for: POCGLU        Past Medical History:   Diagnosis Date   • Anxiety    • Basal cell carcinoma     Follows up with Dermatology annually, PA with Dr. Antoine's office   • Bowen's disease of vulva    • Depression    • History of kidney stone 2018   • Left adrenal mass (CMS/HCC)    • Lung cancer (CMS/HCC)     Right lung with metastasis to neck node.   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       Plan:  nephrology consult noted. Neurology consult noted and oncology. Correct sodium.   urology consult and intermitant cath for urine retention. Fluid restriction. Follow up labs.    PT negin. Encourage to work with PT. Will need SNU for rehab.  She is too weak to go home.  DC planning.  In process    Neftali Dowd MD  1/3/2019  2:04 PM

## 2019-01-03 NOTE — THERAPY TREATMENT NOTE
Acute Care - Physical Therapy Treatment Note  [facility]     Patient Name: Sujata Waters  : 1955  MRN: [id number]  Today's Date: 1/3/2019  Onset of Illness/Injury or Date of Surgery: 18  Date of Referral to PT: 18       Admit Date: 2018    Visit Dx:    ICD-10-CM ICD-9-CM   1. Altered mental status, unspecified altered mental status type R41.82 780.97   2. Primary malignant neoplasm of right lung metastatic to other site (CMS/HCC) C34.91 162.9   3. Hyponatremia E87.1 276.1   4. General weakness R53.1 780.79     Patient Active Problem List   Diagnosis   • AR (allergic rhinitis)   • Postherpetic neuralgia   • Prolapse of mitral valve   • Rosacea   • Smoking addiction   • Left adrenal mass (CMS/HCC)   • Right renal mass   • Uterine mass   • Basal cell carcinoma   • Shingles   • Left cervical lymphadenopathy   • Weight loss   • RUQ pain   • Renal cyst   • Lung mass   • Metastatic cancer (CMS/HCC)   • Small cell lung cancer (CMS/HCC)   • Adenocarcinoma (CMS/HCC)   • Fitting and adjustment of vascular catheter   • Altered mental status   • Hyponatremia   • Urine retention       Therapy Treatment    Rehabilitation Treatment Summary     Row Name 19 1300             Treatment Time/Intention    Discipline  physical therapist  -AR      Document Type  therapy note (daily note)  -AR      Subjective Information  complains of shakiness  -AR      Mode of Treatment  physical therapy  -AR      Therapy Frequency (PT Clinical Impression)  daily  -AR      Patient Effort  fair  -AR      Existing Precautions/Restrictions  fall  -AR      Recorded by [AR] Kerri Vang, PT 19 1344      Row Name 19 1300             Vital Signs    O2 Delivery Pre Treatment  room air  -AR      Recorded by [AR] Kerri Vang, PT 19 1344      Row Name 19 1300             Cognitive Assessment/Intervention- PT/OT    Orientation Status (Cognition)  oriented x 3  -AR      Follows Commands (Cognition)   WNL  -AR      Recorded by [AR] Kerri Vang, PT 01/03/19 1344      Row Name 01/03/19 1300             Bed Mobility Assessment/Treatment    Supine-Sit Cotton (Bed Mobility)  moderate assist (50% patient effort)  -AR      Sit-Supine Cotton (Bed Mobility)  moderate assist (50% patient effort);2 person assist  -AR      Comment (Bed Mobility)  increased shakiness - started/stopped 3 times  -AR      Recorded by [AR] Kerri Vang, PT 01/03/19 1344      Row Name 01/03/19 1300             Transfer Assessment/Treatment    Comment (Transfers)  pt declines, increased shakiness when attempting motivation/education  -AR      Recorded by [AR] Kerri Vang, PT 01/03/19 1344      Row Name 01/03/19 1300             Positioning and Restraints    Pre-Treatment Position  in bed  -AR      Post Treatment Position  bed  -AR      In Bed  supine;call light within reach;encouraged to call for assist;exit alarm on  -AR      Recorded by [AR] Kerri Vang, PT 01/03/19 1344      Row Name 01/03/19 1300             Pain Scale: Numbers Pre/Post-Treatment    Pain Scale: Numbers, Pretreatment  0/10 - no pain  -AR      Pain Scale: Numbers, Post-Treatment  0/10 - no pain  -AR      Recorded by [AR] Kerri Vang, PT 01/03/19 1344      Row Name                Wound 12/31/18 Right anterior wrist skin tear    Wound - Properties Group Date first assessed: 12/31/18 [CAROLINA] Present On Admission : no [CAROLINA] Side: Right [CAROLINA] Orientation: anterior [CAROLINA] Location: wrist [CAROLINA] Type: skin tear [CAROLINA] Recorded by:  [CAROLINA] Meena Cueto RN 12/31/18 1838    Row Name 01/03/19 1300             Outcome Summary/Treatment Plan (PT)    Anticipated Discharge Disposition (PT)  skilled nursing facility  -AR      Recorded by [AR] Kerri Vang, PT 01/03/19 1344        User Key  (r) = Recorded By, (t) = Taken By, (c) = Cosigned By    Initials Name Effective Dates Discipline    AR Kerri Vang, PT 04/03/18 -  PT    Meena Jacobo, RN 09/13/17 -   Nurse          Wound 12/31/18 Right anterior wrist skin tear (Active)   Dressing Appearance no drainage 1/3/2019  9:52 AM   Closure DAPHNE 1/3/2019  9:52 AM   Base dressing in place, unable to visualize 1/3/2019  9:52 AM   Drainage Characteristics/Odor sanguineous 1/3/2019  9:52 AM   Drainage Amount small 1/3/2019  9:52 AM   Dressing Care, Wound other (see comments) 1/2/2019  2:30 PM           Physical Therapy Education     Title: PT OT SLP Therapies (In Progress)     Topic: Physical Therapy (In Progress)     Point: Mobility training (In Progress)     Learning Progress Summary           Patient Acceptance, E, NR by AR at 1/3/2019  1:44 PM    Acceptance, E, NR,NL by EF at 1/2/2019  5:36 PM    Acceptance, E,D, NR by PC at 12/31/2018 11:30 AM    Acceptance, E,TB, VU,DU by CW at 12/30/2018  9:41 AM    Acceptance, E,TB, VU by CS at 12/29/2018 10:57 AM    Acceptance, E,TB, VU,DU by CW at 12/28/2018  9:01 AM    Acceptance, E,TB, VU by AK at 12/28/2018  2:37 AM    Acceptance, TB,E, NR,DU by CW at 12/27/2018  9:43 AM    Acceptance, E, NR by EF at 12/26/2018  2:01 PM    Comment:  Dgt voiced understanding, pt needs reinforcement   Family Acceptance, E,TB, VU,DU by CW at 12/28/2018  9:01 AM    Acceptance, E, NR by EF at 12/26/2018  2:01 PM    Comment:  Dgt voiced understanding, pt needs reinforcement                   Point: Home exercise program (In Progress)     Learning Progress Summary           Patient Acceptance, E, NR by AR at 1/3/2019  1:44 PM    Acceptance, E, NR,NL by EF at 1/2/2019  5:36 PM    Acceptance, E,TB, VU,DU by CW at 12/30/2018  9:41 AM    Acceptance, E,TB, VU by CS at 12/29/2018 10:57 AM    Acceptance, E,TB, VU,DU by CW at 12/28/2018  9:01 AM    Acceptance, E,TB, VU by AK at 12/28/2018  2:37 AM    Acceptance, TB,E, NR,DU by CW at 12/27/2018  9:43 AM    AcceptanceCRUZ NR by EF at 12/26/2018  2:01 PM    Comment:  Dgt voiced understanding, pt needs reinforcement   Family CRUZ Albert TB, KARRI KELLOGG by CW at  12/28/2018  9:01 AM    Acceptance, E, NR by EF at 12/26/2018  2:01 PM    Comment:  Dgt voiced understanding, pt needs reinforcement                   Point: Body mechanics (In Progress)     Learning Progress Summary           Patient Acceptance, E, NR by AR at 1/3/2019  1:44 PM    Acceptance, E, NR,NL by EF at 1/2/2019  5:36 PM    Acceptance, E,D, NR by PC at 12/31/2018 11:30 AM    Acceptance, E,TB, VU,DU by CW at 12/30/2018  9:41 AM    Acceptance, E,TB, VU by CS at 12/29/2018 10:57 AM    Acceptance, E,TB, VU,DU by CW at 12/28/2018  9:01 AM    Acceptance, E,TB, VU by AK at 12/28/2018  2:37 AM    Acceptance, TB,E, NR,DU by CW at 12/27/2018  9:43 AM    Acceptance, E, NR by EF at 12/26/2018  2:01 PM    Comment:  Dgt voiced understanding, pt needs reinforcement   Family Acceptance, E,TB, VU,DU by CW at 12/28/2018  9:01 AM    Acceptance, E, NR by EF at 12/26/2018  2:01 PM    Comment:  Dgt voiced understanding, pt needs reinforcement                   Point: Precautions (In Progress)     Learning Progress Summary           Patient Acceptance, E, NR by AR at 1/3/2019  1:44 PM    Acceptance, E, NR,NL by EF at 1/2/2019  5:36 PM    Acceptance, E,D, NR by PC at 12/31/2018 11:30 AM    Acceptance, E,TB, VU,DU by CW at 12/30/2018  9:41 AM    Acceptance, E,TB, VU by CS at 12/29/2018 10:57 AM    Acceptance, E,TB, VU,DU by CW at 12/28/2018  9:01 AM    Acceptance, E,TB, VU by AK at 12/28/2018  2:37 AM    Acceptance, TB,E, NR,DU by CW at 12/27/2018  9:43 AM    Acceptance, E, NR by EF at 12/26/2018  2:01 PM    Comment:  Dgt voiced understanding, pt needs reinforcement   Family Acceptance, E,TB, VU,DU by CW at 12/28/2018  9:01 AM    Acceptance, E, NR by EF at 12/26/2018  2:01 PM    Comment:  Dgt voiced understanding, pt needs reinforcement                               User Key     Initials Effective Dates Name Provider Type Discipline    EF 06/08/18 -  Lela Espino, PT Physical Therapist PT    PC 04/03/18 -  Kemi Jones, PT  Physical Therapist PT    AR 04/03/18 -  Kerri Vang, PT Physical Therapist PT    CW 03/07/18 -  Freedom Jacobs, PTA Physical Therapy Assistant PT    AK 03/17/17 -  Stephenie Yuen, RN Registered Nurse Nurse     05/14/18 -  Gautam Garcia, PT Physical Therapist PT                PT Recommendation and Plan  Anticipated Discharge Disposition (PT): skilled nursing facility  Therapy Frequency (PT Clinical Impression): daily  Outcome Summary/Treatment Plan (PT)  Anticipated Discharge Disposition (PT): skilled nursing facility  Plan of Care Reviewed With: patient  Outcome Summary: Limited progress towards goals during PT.  Several shaking episodes starting and stopping while sitting EOB, requiring maximal assist for safety.  Pt declined attempting to stand 2/2 dizziness and shaking.    Outcome Measures     Row Name 01/03/19 1300 01/02/19 1700          How much help from another person do you currently need...    Turning from your back to your side while in flat bed without using bedrails?  3  -AR  3  -EF     Moving from lying on back to sitting on the side of a flat bed without bedrails?  2  -AR  3  -EF     Moving to and from a bed to a chair (including a wheelchair)?  2  -AR  3  -EF     Standing up from a chair using your arms (e.g., wheelchair, bedside chair)?  1  -AR  3  -EF     Climbing 3-5 steps with a railing?  1  -AR  1  -EF     To walk in hospital room?  1  -AR  2  -EF     AM-PAC 6 Clicks Score  10  -AR  15  -EF        Functional Assessment    Outcome Measure Options  --  AM-PAC 6 Clicks Basic Mobility (PT)  -EF       User Key  (r) = Recorded By, (t) = Taken By, (c) = Cosigned By    Initials Name Provider Type    EF Lela Espino, PT Physical Therapist    AR Kerri Vang, PT Physical Therapist         Time Calculation:   PT Charges     Row Name 01/03/19 1345             Time Calculation    Start Time  1321  -AR      Stop Time  1338  -AR      Time Calculation (min)  17 min  -AR      PT Received On   01/03/19  -AR      PT - Next Appointment  01/04/19  -AR        User Key  (r) = Recorded By, (t) = Taken By, (c) = Cosigned By    Initials Name Provider Type    Kerri Baxter PT Physical Therapist        Therapy Suggested Charges     Code   Minutes Charges    None           Therapy Charges for Today     Code Description Service Date Service Provider Modifiers Qty    34103283700 HC PT THER PROC EA 15 MIN 1/3/2019 Kerri Vang, PT GP 1    63675418726 HC PT THER SUPP EA 15 MIN 1/3/2019 Kerri Vang, PT GP 1          PT G-Codes  Outcome Measure Options: AM-PAC 6 Clicks Basic Mobility (PT)  AM-PAC 6 Clicks Score: 10  Score: 12    Kerri Vang PT  1/3/2019

## 2019-01-03 NOTE — DISCHARGE PLACEMENT REQUEST
"Sujata Hardin (63 y.o. Female)     Date of Birth Social Security Number Address Home Phone MRN    [id number]  [id number] [facility] [phone] [id number]    Buddhism Marital Status          Orthodoxy        Admission Date Admission Type Admitting Provider Attending Provider Department, Room/Bed    12/24/18 Emergency Marsha Hameed MD Beard, Lyle E, MD [address], [address]    Discharge Date Discharge Disposition Discharge Destination                       Attending Provider:  Neftali Dowd MD    Allergies:  No Known Allergies    Isolation:  None   Infection:  None   Code Status:  CPR    Ht:  162.6 cm (64\")   Wt:  34.5 kg (76 lb 0.9 oz)    Admission Cmt:  None   Principal Problem:  Altered mental status [R41.82]                 Active Insurance as of 12/24/2018     Primary Coverage     Payor Plan Insurance Group Employer/Plan Group    MEDICARE MEDICARE A & B      Payor Plan Address Payor Plan Phone Number Payor Plan Fax Number Effective Dates    [address] [phone]  5/1/2018 - None Entered    [facility] [id number]       Subscriber Name Subscriber Birth Date Member ID       SUJATA HARDIN 1955 [id number]           Secondary Coverage     Payor Plan Insurance Group Employer/Plan Group    [id number]     Payor Plan Address Payor Plan Phone Number Payor Plan Fax Number Effective Dates    [address]   5/1/2018 - None Entered    [facility] [id number]       Subscriber Name Subscriber Birth Date Member ID       SUJATA HARDIN 1955 [id number]                 Emergency Contacts      (Rel.) Home Phone Work Phone Mobile Phone    Vernell Maier (Sister) [phone] -- --              "

## 2019-01-03 NOTE — PLAN OF CARE
Problem: Patient Care Overview  Goal: Individualization and Mutuality  Outcome: Ongoing (interventions implemented as appropriate)   12/25/18 0445 12/25/18 1705   Individualization   Patient Specific Preferences wants discharged home for Bryan --    Patient Specific Goals (Include Timeframe) --  Safe enironment   Patient Specific Interventions monitor strict I/O's --        Problem: Skin Injury Risk (Adult)  Goal: Skin Health and Integrity  Outcome: Ongoing (interventions implemented as appropriate)   01/03/19 0622   Skin Injury Risk (Adult)   Skin Health and Integrity making progress toward outcome       Problem: Fall Risk (Adult)  Goal: Absence of Fall  Outcome: Ongoing (interventions implemented as appropriate)   01/03/19 1245   Fall Risk (Adult)   Absence of Fall making progress toward outcome       Problem: Nutrition, Imbalanced: Inadequate Oral Intake (Adult)  Goal: Identify Related Risk Factors and Signs and Symptoms  Outcome: Ongoing (interventions implemented as appropriate)   01/02/19 0516   Nutrition, Imbalanced: Inadequate Oral Intake (Adult)   Related Risk Factors (Nutrition Imbalance, Inadequate Oral Intake) chronic illness/infection   Signs and Symptoms (Nutrition Imbalance, Inadequate Oral Intake: Signs and Symptoms) muscle mass/strength decreased     Goal: Improved Oral Intake  Outcome: Ongoing (interventions implemented as appropriate)   01/03/19 0622   Nutrition, Imbalanced: Inadequate Oral Intake (Adult)   Improved Oral Intake making progress toward outcome     Goal: Prevent Further Weight Loss  Outcome: Ongoing (interventions implemented as appropriate)   01/03/19 0622   Nutrition, Imbalanced: Inadequate Oral Intake (Adult)   Prevent Further Weight Loss making progress toward outcome

## 2019-01-03 NOTE — PLAN OF CARE
Problem: Patient Care Overview  Goal: Plan of Care Review   01/03/19 0959   Coping/Psychosocial   Plan of Care Reviewed With patient   OTHER   Outcome Summary Limited progress towards goals during PT. Several shaking episodes starting and stopping while sitting EOB, requiring maximal assist for safety. Pt declined attempting to stand 2/2 dizziness and shaking.

## 2019-01-03 NOTE — PLAN OF CARE
Problem: Patient Care Overview  Goal: Plan of Care Review  Outcome: Ongoing (interventions implemented as appropriate)   01/03/19 9823   Coping/Psychosocial   Plan of Care Reviewed With patient;sibling   Plan of Care Review   Progress no change   OTHER   Outcome Summary Assisted pt to EOB with Min A. Initially sat EOB with SBA, when began to put on gait belt to stand pt began whole body shaking, extension and flat gaze. Assisted pt back to bed where pt began talking appropriately. RN called to room and checks pt.

## 2019-01-03 NOTE — PROGRESS NOTES
"   LOS: 10 days    Patient Care Team:  Bernie Francois MD as PCP - General (Family Medicine)  Leyla Canada MD as Consulting Physician (Hematology and Oncology)  Douglas Long MD as Consulting Physician (Hematology and Oncology)    Chief Complaint:    Chief Complaint   Patient presents with   • Altered Mental Status     Follow UP hypoNa   Subjective     Interval History:   Quiet today. Answers questions, still seems guarded.  Family at bedside.  Not eating much on tray.  3 BM.  RN is doing intermittent cath.    Objective     Vital Signs  Temp:  [98.1 °F (36.7 °C)-98.5 °F (36.9 °C)] 98.3 °F (36.8 °C)  Heart Rate:  [67-80] 71  Resp:  [16-18] 18  BP: (105-173)/(68-93) 105/70    Flowsheet Rows      First Filed Value   Admission Height  162.6 cm (64\") Documented at 12/24/2018 1517   Admission Weight  42.7 kg (94 lb 1.6 oz) Documented at 12/24/2018 1517          No intake/output data recorded.  I/O last 3 completed shifts:  In: 480 [P.O.:480]  Out: 1900 [Urine:1900]    Intake/Output Summary (Last 24 hours) at 1/3/2019 1516  Last data filed at 1/3/2019 0439  Gross per 24 hour   Intake 180 ml   Output 1050 ml   Net -870 ml       Physical Exam:  Frail, cachectic WF in no acute distress  Neck supple no JVD. Right chest port.   Lungs Clear to auscultation   Heart RRR no murmur or rub  Abd soft, + bs, nontender  Ext no pedal edema 2+ radial pulses      Results Review:    Results from last 7 days   Lab Units  01/03/19   0617  01/02/19   2328  01/02/19   0516  01/01/19   0657   SODIUM mmol/L  133*   --   130*  129*   POTASSIUM mmol/L  3.9  4.4  3.0*  3.7   CHLORIDE mmol/L  93*   --   91*  91*   CO2 mmol/L  28.8   --   26.1  24.4   BUN mg/dL  19   --   19  20   CREATININE mg/dL  0.39*   --   0.38*  0.39*   CALCIUM mg/dL  9.4   --   9.2  9.2   GLUCOSE mg/dL  112*   --   99  96       Estimated Creatinine Clearance: 38.2 mL/min (A) (by C-G formula based on SCr of 0.39 mg/dL (L)).    Results from last 7 days "   Lab Units  01/02/19   2328  12/31/18   0524  12/28/18   0552   MAGNESIUM mg/dL  1.9  1.8  3.4*             Results from last 7 days   Lab Units  01/03/19   0617  01/02/19   0516  01/01/19   0657  12/31/18   0524  12/30/18   0606   WBC 10*3/mm3  10.71*  6.62  7.36  7.63  7.30   HEMOGLOBIN g/dL  13.3  13.2  13.1  13.1  13.0   PLATELETS 10*3/mm3  302  276  283  290  275               Imaging Results (last 24 hours)     ** No results found for the last 24 hours. **          aspirin 325 mg Oral Daily   Or      aspirin 300 mg Rectal Daily   atorvastatin 20 mg Oral Nightly   furosemide 20 mg Oral BID   nicotine 1 patch Transdermal Q24H   potassium chloride 40 mEq Oral BID   potassium chloride 40 mEq Oral Once   potassium chloride 10 mEq Intravenous Once   sodium chloride 3 mL Intravenous Q12H          Medication Review:   Current Facility-Administered Medications   Medication Dose Route Frequency Provider Last Rate Last Dose   • aspirin tablet 325 mg  325 mg Oral Daily Jo Harp MD   325 mg at 01/03/19 1003    Or   • aspirin suppository 300 mg  300 mg Rectal Daily Jo Harp MD   300 mg at 12/24/18 1657   • atorvastatin (LIPITOR) tablet 20 mg  20 mg Oral Nightly Jo Harp MD   20 mg at 01/02/19 2049   • furosemide (LASIX) tablet 20 mg  20 mg Oral BID Kevin Faust MD   20 mg at 01/03/19 1004   • hydrocortisone 1 % cream 1 application  1 application Topical TID PRN Marsha Hameed MD       • hydrOXYzine (VISTARIL) capsule 25 mg  25 mg Oral TID PRN Neftali Dowd MD   25 mg at 12/27/18 1833   • Magnesium Sulfate 2 gram Bolus, followed by 8 gram infusion (total Mg dose 10 grams)- Mg less than or equal to 1mg/dL  2 g Intravenous PRN Kevin Faust MD        Or   • Magnesium Sulfate 2 gram / 50mL Infusion (GIVE X 3 BAGS TO EQUAL 6GM TOTAL DOSE) - Mg 1.1 - 1.5 mg/dl  2 g Intravenous PRN Kevin Faust MD 25 mL/hr at 12/28/18 0531 2 g at 12/28/18 0531    Or   • Magnesium Sulfate 4 gram  infusion- Mg 1.6-1.9 mg/dL  4 g Intravenous PRN Kevin Faust MD       • nicotine (NICODERM CQ) 21 MG/24HR patch 1 patch  1 patch Transdermal Q24H Neftali Dowd MD   Stopped at 12/27/18 2134   • nitroglycerin (NITROSTAT) SL tablet 0.4 mg  0.4 mg Sublingual Q5 Min PRN Neftali Dowd MD       • potassium chloride (KLOR-CON) packet 40 mEq  40 mEq Oral PRN Neftali Dowd MD   40 mEq at 12/29/18 1707   • potassium chloride (MICRO-K) CR capsule 40 mEq  40 mEq Oral PRN Neftali Dowd MD        Or   • potassium chloride (KLOR-CON) packet 40 mEq  40 mEq Oral PRN Neftali Dowd MD        Or   • potassium chloride 10 mEq in 100 mL IVPB  10 mEq Intravenous Q1H PRN Neftali Dowd MD       • potassium chloride (KLOR-CON) packet 40 mEq  40 mEq Oral BID Mariely Harman MD   40 mEq at 01/03/19 1004   • potassium chloride (MICRO-K) CR capsule 40 mEq  40 mEq Oral PRN Neftali Dowd MD   40 mEq at 01/02/19 1748   • potassium chloride (MICRO-K) CR capsule 40 mEq  40 mEq Oral Once Kevin Faust MD       • potassium chloride 10 mEq in 100 mL IVPB  10 mEq Intravenous Once Yuridia De La Cruz PA       • potassium chloride 10 mEq in 100 mL IVPB  10 mEq Intravenous Q1H PRN Gary Jaime  mL/hr at 12/26/18 0159 10 mEq at 12/26/18 0159   • sodium chloride 0.9 % flush 10 mL  10 mL Intravenous PRN Yuridia De La Cruz PA       • sodium chloride 0.9 % flush 3 mL  3 mL Intravenous Q12H Jo Harp MD   3 mL at 01/03/19 1004   • sodium chloride 0.9 % flush 3-10 mL  3-10 mL Intravenous PRN Jo Harp MD       • zolpidem (AMBIEN) tablet 5 mg  5 mg Oral Nightly PRN Neftali Dowd MD   5 mg at 01/02/19 2049       Assessment/Plan   1. 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  TSH & cortisol WNL.  GFR normal.  Euvolemic, on lasix. Improved today to 133.    2. Metastatic small cell lung cancer on opdivo sp one dose 12/10.  Due again Jan 14.   3. Urinary retention: CIC  recommended. She is unable to do this.     Plan  1. Continue current fluid restriction 1200 cc.   2. Scheduled Jan 14 at 12:40 to see Dr. Canada and get labs for opdivo.       Mariely Harman MD  01/03/19  3:16 PM

## 2019-01-03 NOTE — PROGRESS NOTES
Continued Stay Note  [facility]     Patient Name: Sujata Waters  MRN: [id number]  Today's Date: 1/3/2019    Admit Date: 12/24/2018    Discharge Plan     Row Name 01/03/19 1659       Plan    Plan  Referral to SNF's for medicaid pending; also consult Palliative for explaination of services.    Patient/Family in Agreement with Plan  yes    Plan Comments  Spoke with Rosa/[facility] and Serenity/[facility], they are unable to accept pt at this time, as pt will need LTC/Medicaid pending bed and they do not have any. Spoke with pt and sister Vernell at bedside, discussed medicaid pending process vs skilled therapy rehab. Permission granted to make area referrals to determine medicaid pending bed availablity for pt to then choose. CCP also discussed if pt would like palliative care consult for explaination of services, Pt and sister agreeable. CCP made referrals to Eddy/[facility], Eddy/[facility], Vn[facility], Meena/[facility], Serenity/[facility], Marilee/[facility], Flavia/Kevin, María/[facility], [facility], Monika/[facility], Alpa/[facility], [facility]. [facility]         Discharge Codes    No documentation.             Allie Dueñas RN

## 2019-01-03 NOTE — PROGRESS NOTES
[facility]  Nicola Solitario MD     LOS: 10 days   Patient Care Team:  Bernie Francois MD as PCP - General (Family Medicine)  Leyla Canada MD as Consulting Physician (Hematology and Oncology)  Douglas Long MD as Consulting Physician (Hematology and Oncology)        Subjective     Interval History:     Patient Complaints: Still cannot urinate.  Relates that she has not been taught how to self catheter  History taken from: patient chart family RN Weakness.  Does not have the ability to get up or learn self catheter.    Review of Systems:   All systems were reviewed and were negative except for inability to urinate.  Urinary retention.  No chest pain.  No shortness of air.  Generalized weakness.  Inability to ambulate.    Objective     Vital Signs  Temp:  [98.1 °F (36.7 °C)-98.5 °F (36.9 °C)] 98.5 °F (36.9 °C)  Heart Rate:  [61-80] 69  Resp:  [16-18] 18  BP: (107-173)/(68-93) 129/75    Physical Exam:     General Appearance:    Alert, cooperative, in no acute distress   Head:    Normocephalic, without obvious abnormality, atraumatic   Eyes:            Lids and lashes normal, conjunctivae and sclerae normal, no   icterus, no pallor, corneas clear, PERRLA   Ears:    Ears appear intact with no abnormalities noted   Throat:   No oral lesions, no thrush, oral mucosa moist   Neck:   No adenopathy, supple, trachea midline,    Back:     No kyphosis present, no scoliosis present, no skin lesions,      erythema or scars, no tenderness to percussion or                   palpation,   range of motion normal   Lungs:     Clear to auscultation,respirations regular, even and                  unlabored    Heart:    Regular rhythm and normal rate, normal S1 and S2, no            murmur, no gallop, no rub, no click   Chest Wall:    No abnormalities observed   Abdomen:     Normal bowel sounds, no masses, no organomegaly, soft        non-tender, non-distended, no guarding, no rebound                tenderness    Genital/Rectal:     Deferred    Extremities:   Moves all extremities well, no edema, no cyanosis, no             redness       Skin:   No bleeding, bruising or rash       Neurologic:   Cranial nerves 2 - 12 grossly intact, sensation intact,        Results Review:     I reviewed the patient's new clinical results.    Recent Results (from the past 24 hour(s))   Magnesium    Collection Time: 01/02/19 11:28 PM   Result Value Ref Range    Magnesium 1.9 1.6 - 2.4 mg/dL   Potassium    Collection Time: 01/02/19 11:28 PM   Result Value Ref Range    Potassium 4.4 3.5 - 5.2 mmol/L   Basic Metabolic Panel    Collection Time: 01/03/19  6:17 AM   Result Value Ref Range    Glucose 112 (H) 65 - 99 mg/dL    BUN 19 8 - 23 mg/dL    Creatinine 0.39 (L) 0.57 - 1.00 mg/dL    Sodium 133 (L) 136 - 145 mmol/L    Potassium 3.9 3.5 - 5.2 mmol/L    Chloride 93 (L) 98 - 107 mmol/L    CO2 28.8 22.0 - 29.0 mmol/L    Calcium 9.4 8.6 - 10.5 mg/dL    eGFR Non African Amer >150 >60 mL/min/1.73    BUN/Creatinine Ratio 48.7 (H) 7.0 - 25.0    Anion Gap 11.2 mmol/L   CBC Auto Differential    Collection Time: 01/03/19  6:17 AM   Result Value Ref Range    WBC 10.71 (H) 4.50 - 10.70 10*3/mm3    RBC 3.89 (L) 3.90 - 5.20 10*6/mm3    Hemoglobin 13.3 11.9 - 15.5 g/dL    Hematocrit 36.7 35.6 - 45.5 %    MCV 94.3 80.5 - 98.2 fL    MCH 34.2 (H) 26.9 - 32.0 pg    MCHC 36.2 32.4 - 36.3 g/dL    RDW 12.0 11.7 - 13.0 %    RDW-SD 40.3 37.0 - 54.0 fl    MPV 9.6 6.0 - 12.0 fL    Platelets 302 140 - 500 10*3/mm3    Neutrophil % 84.4 (H) 42.7 - 76.0 %    Lymphocyte % 12.2 (L) 19.6 - 45.3 %    Monocyte % 3.1 (L) 5.0 - 12.0 %    Eosinophil % 0.2 (L) 0.3 - 6.2 %    Basophil % 0.1 0.0 - 1.5 %    Immature Grans % 0.4 0.0 - 0.5 %    Neutrophils, Absolute 9.04 (H) 1.90 - 8.10 10*3/mm3    Lymphocytes, Absolute 1.31 0.90 - 4.80 10*3/mm3    Monocytes, Absolute 0.33 0.20 - 1.20 10*3/mm3    Eosinophils, Absolute 0.02 0.00 - 0.70 10*3/mm3    Basophils, Absolute 0.01 0.00 - 0.20  10*3/mm3    Immature Grans, Absolute 0.04 (H) 0.00 - 0.03 10*3/mm3       Medication Review:   Current Facility-Administered Medications:   •  aspirin tablet 325 mg, 325 mg, Oral, Daily, 325 mg at 01/03/19 1003 **OR** aspirin suppository 300 mg, 300 mg, Rectal, Daily, Jo Harp MD, 300 mg at 12/24/18 1657  •  atorvastatin (LIPITOR) tablet 20 mg, 20 mg, Oral, Nightly, Jo Harp MD, 20 mg at 01/02/19 2049  •  furosemide (LASIX) tablet 20 mg, 20 mg, Oral, BID, Kevin Faust MD, 20 mg at 01/03/19 1004  •  hydrocortisone 1 % cream 1 application, 1 application, Topical, TID PRN, Marsha Hameed MD  •  hydrOXYzine (VISTARIL) capsule 25 mg, 25 mg, Oral, TID PRN, Neftali Dowd MD, 25 mg at 12/27/18 1833  •  Magnesium Sulfate 2 gram Bolus, followed by 8 gram infusion (total Mg dose 10 grams)- Mg less than or equal to 1mg/dL, 2 g, Intravenous, PRN **OR** Magnesium Sulfate 2 gram / 50mL Infusion (GIVE X 3 BAGS TO EQUAL 6GM TOTAL DOSE) - Mg 1.1 - 1.5 mg/dl, 2 g, Intravenous, PRN, Last Rate: 25 mL/hr at 12/28/18 0531, 2 g at 12/28/18 0531 **OR** Magnesium Sulfate 4 gram infusion- Mg 1.6-1.9 mg/dL, 4 g, Intravenous, PRN, Kevin Faust MD  •  nicotine (NICODERM CQ) 21 MG/24HR patch 1 patch, 1 patch, Transdermal, Q24H, Neftali Dowd MD, Stopped at 12/27/18 2134  •  nitroglycerin (NITROSTAT) SL tablet 0.4 mg, 0.4 mg, Sublingual, Q5 Min PRN, Neftali Dowd MD  •  potassium chloride (KLOR-CON) packet 40 mEq, 40 mEq, Oral, PRN, Neftali Dowd MD, 40 mEq at 12/29/18 1707  •  potassium chloride (MICRO-K) CR capsule 40 mEq, 40 mEq, Oral, PRN **OR** potassium chloride (KLOR-CON) packet 40 mEq, 40 mEq, Oral, PRN **OR** potassium chloride 10 mEq in 100 mL IVPB, 10 mEq, Intravenous, Q1H PRN, Neftali Dowd MD  •  potassium chloride (KLOR-CON) packet 40 mEq, 40 mEq, Oral, BID, Mariely Harman MD, 40 mEq at 01/03/19 1004  •  potassium chloride (MICRO-K) CR capsule 40 mEq, 40 mEq, Oral, PRN, Neftali Dowd MD,  40 mEq at 01/02/19 1748  •  potassium chloride (MICRO-K) CR capsule 40 mEq, 40 mEq, Oral, Once, Kevin Faust MD  •  [COMPLETED] potassium chloride 10 mEq in 100 mL IVPB, 10 mEq, Intravenous, Once, Last Rate: 100 mL/hr at 12/24/18 2304, 10 mEq at 12/24/18 2304 **AND** [COMPLETED] potassium chloride 10 mEq in 100 mL IVPB, 10 mEq, Intravenous, Once, Last Rate: 100 mL/hr at 12/25/18 0019, 10 mEq at 12/25/18 0019 **AND** [COMPLETED] potassium chloride 10 mEq in 100 mL IVPB, 10 mEq, Intravenous, Once, Last Rate: 100 mL/hr at 12/25/18 0130, 10 mEq at 12/25/18 0130 **AND** [COMPLETED] potassium chloride 10 mEq in 100 mL IVPB, 10 mEq, Intravenous, Once, Last Rate: 100 mL/hr at 12/25/18 0227, 10 mEq at 12/25/18 0227 **AND** [COMPLETED] potassium chloride 10 mEq in 100 mL IVPB, 10 mEq, Intravenous, Once, Last Rate: 100 mL/hr at 12/25/18 0421, 10 mEq at 12/25/18 0421 **AND** potassium chloride 10 mEq in 100 mL IVPB, 10 mEq, Intravenous, Once **AND** Potassium, , , PRN, Yuridia De La Cruz PA  •  potassium chloride 10 mEq in 100 mL IVPB, 10 mEq, Intravenous, Q1H PRN, Gary Jaime MD, Last Rate: 100 mL/hr at 12/26/18 0159, 10 mEq at 12/26/18 0159  •  sodium chloride 0.9 % flush 10 mL, 10 mL, Intravenous, PRN, Yuridia De La Cruz PA  •  sodium chloride 0.9 % flush 3 mL, 3 mL, Intravenous, Q12H, Jo Harp MD, 3 mL at 01/03/19 1004  •  sodium chloride 0.9 % flush 3-10 mL, 3-10 mL, Intravenous, PRN, Jo Harp MD  •  zolpidem (AMBIEN) tablet 5 mg, 5 mg, Oral, Nightly PRN, Neftali Dowd MD, 5 mg at 01/02/19 2049    Assessment/Plan       Altered mental status    Metastatic cancer (CMS/HCC)    Small cell lung cancer (CMS/HCC)    Hyponatremia    Urine retention          Plan for disposition:I spoke with the nursing staff.  Evidently she is too weak to try to self catheter.  She had a residual of 900 cc earlier today.  Most likely if she goes to rehabilitation she will need a Choudhury catheter.    Nicola PALACIOS  MD Kassi  01/03/19  1:06 PM      Time: 25

## 2019-01-03 NOTE — PLAN OF CARE
Problem: Patient Care Overview  Goal: Plan of Care Review  Outcome: Ongoing (interventions implemented as appropriate)   01/02/19 2953   Coping/Psychosocial   Plan of Care Reviewed With patient   Plan of Care Review   Progress improving   OTHER   Outcome Summary Pt disoriented to situaton at times, very uncoordinated dropping drinks and hard for her to feed self, unable to teach how to self cath very well. Will monitor.        Problem: Skin Injury Risk (Adult)  Goal: Skin Health and Integrity  Outcome: Ongoing (interventions implemented as appropriate)      Problem: Fall Risk (Adult)  Goal: Absence of Fall  Outcome: Ongoing (interventions implemented as appropriate)      Problem: Nutrition, Imbalanced: Inadequate Oral Intake (Adult)  Goal: Improved Oral Intake  Outcome: Ongoing (interventions implemented as appropriate)

## 2019-01-03 NOTE — THERAPY TREATMENT NOTE
Acute Care - Occupational Therapy Progress Note  [facility]     Patient Name: Sujata Waters  : 1955  MRN: [id number]  Today's Date: 1/3/2019  Onset of Illness/Injury or Date of Surgery: 18          Admit Date: 2018       ICD-10-CM ICD-9-CM   1. Altered mental status, unspecified altered mental status type R41.82 780.97   2. Primary malignant neoplasm of right lung metastatic to other site (CMS/HCC) C34.91 162.9   3. Hyponatremia E87.1 276.1   4. General weakness R53.1 780.79     Patient Active Problem List   Diagnosis   • AR (allergic rhinitis)   • Postherpetic neuralgia   • Prolapse of mitral valve   • Rosacea   • Smoking addiction   • Left adrenal mass (CMS/HCC)   • Right renal mass   • Uterine mass   • Basal cell carcinoma   • Shingles   • Left cervical lymphadenopathy   • Weight loss   • RUQ pain   • Renal cyst   • Lung mass   • Metastatic cancer (CMS/HCC)   • Small cell lung cancer (CMS/HCC)   • Adenocarcinoma (CMS/HCC)   • Fitting and adjustment of vascular catheter   • Altered mental status   • Hyponatremia   • Urine retention     Past Medical History:   Diagnosis Date   • Anxiety    • Basal cell carcinoma     Follows up with Dermatology annually, PA with Dr. Antoine's office   • Bowen's disease of vulva    • Depression    • History of kidney stone    • Left adrenal mass (CMS/HCC)    • Lung cancer (CMS/HCC)     Right lung with metastasis to neck node.   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 Roberto Calle, [facility]   • VENOUS ACCESS DEVICE (PORT) INSERTION Right 5/25/2018    Procedure: INSERTION OF PORTACATH;  Surgeon: Jelani Granger MD;  Location: [facility] OR;  Service: General   • VITRECTOMY PARS PLANA      WITH REPAIR OF MACULAR HOLE   • VULVECTOMY N/A     partial, due to Bowen's Disease       Therapy Treatment    Rehabilitation Treatment Summary     Row Name 01/03/19 1438 01/03/19 1300          Treatment Time/Intention    Discipline  occupational therapist  -LE  physical therapist  -AR     Document Type  therapy note (daily note)  -LE  therapy note (daily note)  -AR     Subjective Information  -- agrees to EOB.    -LE  complains of shakiness  -AR     Mode of Treatment  --  physical therapy  -AR     Patient/Family Observations  fowlers.  sister present  -LE  --     Care Plan Review  care plan/treatment goals reviewed  -LE  --     Care Plan Review, Other Participant(s)  sibling  -LE  --     Therapy Frequency (PT Clinical Impression)  --  daily  -AR     Patient Effort  fair  -LE  fair  -AR     Comment  RN called to room when pt had episode of shaking, flat stare BP ok and pt talking  -LE  --     Existing Precautions/Restrictions  fall  (Significant)  whole body shaking when EOB  -LE  fall  -AR     Recorded by [LE] Eve Coffey, OTR 01/03/19 1448 [AR] Kerri Vang, PT 01/03/19 1344     Row Name 01/03/19 1438 01/03/19 1300          Vital Signs    Post Systolic BP Rehab  130  -LE  --     Post Treatment Diastolic BP  76  -LE  --     O2 Delivery Pre Treatment  room air  -LE  room air  -AR     O2 Delivery Intra Treatment  room air  -LE  --     O2 Delivery Post Treatment  room air  -LE  --     Pre Patient Position  Supine  -LE  --     Intra Patient Position  Sitting  -LE  --     Post Patient Position  Supine   -LE  --     Activity Duration  -- unable to stand or prolonge sitting due to shaking  -LE  --     Recorded by [LE] Eve Coffey, OTR 01/03/19 1448 [AR] Kerri Vang, PT 01/03/19 1344     Row Name 01/03/19 1438 01/03/19 1300          Cognitive Assessment/Intervention- PT/OT    Orientation Status (Cognition)  oriented to;person  -LE  oriented x 3  -AR     Follows Commands (Cognition)  --  WNL  -AR     Safety Deficit (Cognitive)  -- flat affect. poor intiation of conversation  -LE  --     Personal Safety Interventions  fall prevention program maintained;gait belt;nonskid shoes/slippers when out of bed  -LE  --     Recorded by [LE] Eve Coffey, OTR 01/03/19 1448 [AR] Kerri Vang, PT 01/03/19 1344     Row Name 01/03/19 1438 01/03/19 1300          Bed Mobility Assessment/Treatment    Bed Mobility Assessment/Treatment  scooting/bridging  -LE  --     Scooting/Bridging [place] (Bed Mobility)  dependent (less than 25% patient effort);2 person assist  -LE  --     Supine-Sit [place] (Bed Mobility)  minimum assist (75% patient effort)  -LE  moderate assist (50% patient effort)  -AR     Sit-Supine [place] (Bed Mobility)  maximum assist (25% patient effort)  -LE  moderate assist (50% patient effort);2 person assist  -AR     Bed Mobility, Safety Issues  cognitive deficits limit understanding;decreased use of arms for pushing/pulling;decreased use of legs for bridging/pushing;impaired trunk control for bed mobility  -LE  --     Assistive Device (Bed Mobility)  bed rails;draw sheet;head of bed elevated  -LE  --     Comment (Bed Mobility)  --  increased shakiness - started/stopped 3 times  -AR     Recorded by [LE] Eve Coffey, OTR 01/03/19 1448 [AR] Kerri Vang, PT 01/03/19 1344     Row Name 01/03/19 1438             Functional Mobility    Functional Mobility- Ind. Level  unable to perform;not appropriate to assess  -LE      Recorded by [LE] Eve Coffey, OTR 01/03/19 1448      Row Name 01/03/19 1438  01/03/19 1300          Transfer Assessment/Treatment    Transfer Assessment/Treatment  toilet transfer  -LE  --     Comment (Transfers)  --  pt declines, increased shakiness when attempting motivation/education  -AR     Recorded by [LE] Eve Coffey, OTR 01/03/19 1448 [AR] Kerri Vang, PT 01/03/19 1344     Row Name 01/03/19 1438             Sit-Stand Transfer    Sit-Stand [place] (Transfers)  unable to assess;not tested  -LE      Recorded by [LE] Eve Coffey, OTR 01/03/19 1448      Row Name 01/03/19 1438             Toilet Transfer    Assistive Device (Toilet Transfer)  -- RN states took 3 people for BSC xfer.   -LE      Recorded by [LE] Eve Coffey, OTR 01/03/19 1448      Row Name 01/03/19 1438             ADL Assessment/Intervention    BADL Assessment/Intervention  bathing;upper body dressing;lower body dressing;grooming;feeding;toileting  -LE      Recorded by [LE] Eve Coffey, OTR 01/03/19 1448      Row Name 01/03/19 1438             Self-Feeding Assessment/Training    [place] Level (Feeding)  -- pt reports set up.  -LE      Recorded by [LE] Eve Coffey, OTR 01/03/19 1448      Row Name 01/03/19 1438             Toileting Assessment/Training    Comment (Toileting)  being cathetrized by nsg.  pt cannot recall what happens when bowel movement.   -LE      Recorded by [LE] Eve Coffey, OTR 01/03/19 1448      Row Name 01/03/19 1438 01/03/19 1300          Positioning and Restraints    Pre-Treatment Position  in bed  -LE  in bed  -AR     Post Treatment Position  bed  -LE  bed  -AR     In Bed  notified nsg;fowlers;call light within reach;encouraged to call for assist;exit alarm on;with family/caregiver  -LE  supine;call light within reach;encouraged to call for assist;exit alarm on  -AR     Recorded by [LE] Eve Coffey, OTR 01/03/19 1448 [AR] Kerri Vang, PT 01/03/19 1344     Row Name 01/03/19 1438 01/03/19 1300          Pain Scale: Numbers Pre/Post-Treatment    Pain Scale: Numbers, Pretreatment   0/10 - no pain  -LE  0/10 - no pain  -AR     Pain Scale: Numbers, Post-Treatment  --  0/10 - no pain  -AR     Recorded by [LE] Eve Coffey, OTR 01/03/19 1448 [AR] Kerri Vang, PT 01/03/19 1344     Row Name                Wound 12/31/18 Right anterior wrist skin tear    Wound - Properties Group Date first assessed: 12/31/18 [CAROLINA] Present On Admission : no [CAROLINA] Side: Right [CAROLINA] Orientation: anterior [CAROLINA] Location: wrist [CAROLINA] Type: skin tear [CAROLINA] Recorded by:  [CAROLINA] Meena Cueto RN 12/31/18 1838    Row Name 01/03/19 1300             Outcome Summary/Treatment Plan (PT)    Anticipated Discharge Disposition (PT)  skilled nursing facility  -AR      Recorded by [AR] Kerri Vang, PT 01/03/19 1344        User Key  (r) = Recorded By, (t) = Taken By, (c) = Cosigned By    Initials Name Effective Dates Discipline    LE Eve Coffey, OTR 06/08/18 -  OT    Kerri Baxter, PT 04/03/18 -  PT    Meena Jacobo RN 09/13/17 -  Nurse        Wound 12/31/18 Right anterior wrist skin tear (Active)   Dressing Appearance no drainage 1/3/2019  9:52 AM   Closure DAPHNE 1/3/2019  9:52 AM   Base dressing in place, unable to visualize 1/3/2019  9:52 AM   Drainage Characteristics/Odor sanguineous 1/3/2019  9:52 AM   Drainage Amount small 1/3/2019  9:52 AM       Occupational Therapy Education     Title: PT OT SLP Therapies (In Progress)     Topic: Occupational Therapy (Done)     Point: ADL training (Done)     Description: Instruct learner(s) on proper safety adaptation and remediation techniques during self care or transfers.   Instruct in proper use of assistive devices.    Learning Progress Summary           Patient Acceptance, E,TB, VU by AK at 12/28/2018  2:37 AM    Acceptance, E,TB, VU,NR by SG at 12/27/2018  1:45 PM    Comment:  safety for adls,   Family Acceptance, E,TB, VU,NR by SG at 12/27/2018  1:45 PM    Comment:  safety for adls,                   Point: Home exercise program (Done)     Description: Instruct  learner(s) on appropriate technique for monitoring, assisting and/or progressing therapeutic exercises/activities.    Learning Progress Summary           Patient Acceptance, E,TB, VU by AK at 12/28/2018  2:37 AM                   Point: Precautions (Done)     Description: Instruct learner(s) on prescribed precautions during self-care and functional transfers.    Learning Progress Summary           Patient Acceptance, E,TB, VU by AK at 12/28/2018  2:37 AM                   Point: Body mechanics (Done)     Description: Instruct learner(s) on proper positioning and spine alignment during self-care, functional mobility activities and/or exercises.    Learning Progress Summary           Patient Acceptance, E,TB, VU by AK at 12/28/2018  2:37 AM                               User Key     Initials Effective Dates Name Provider Type Discipline     06/08/18 -  Pippa De Dios OTR Occupational Therapist OT    AK 03/17/17 -  Stephenie Yuen, RN Registered Nurse Nurse                OT Recommendation and Plan     Plan of Care Review  Plan of Care Reviewed With: patient, sibling  Plan of Care Reviewed With: patient, sibling  Outcome Summary: Assisted pt to EOB with Min A.  Initially sat EOB with SBA, when began to put on gait belt to stand pt began whole body shaking, extension and flat gaze.  Assisted pt back to bed where pt began talking appropriately.  RN called to room and checks pt.  Outcome Measures     Row Name 01/03/19 1436 01/03/19 1400 01/03/19 1300       How much help from another person do you currently need...    Turning from your back to your side while in flat bed without using bedrails?  --  --  3  -AR    Moving from lying on back to sitting on the side of a flat bed without bedrails?  --  --  2  -AR    Moving to and from a bed to a chair (including a wheelchair)?  --  --  2  -AR    Standing up from a chair using your arms (e.g., wheelchair, bedside chair)?  --  --  1  -AR    Climbing 3-5 steps with a railing?  --   --  1  -AR    To walk in hospital room?  --  --  1  -AR    AM-PAC 6 Clicks Score  --  --  10  -AR       How much help from another is currently needed...    Putting on and taking off regular lower body clothing?  1  -LE  --  --    Bathing (including washing, rinsing, and drying)  2  -LE  --  --    Toileting (which includes using toilet bed pan or urinal)  1  -LE  --  --    Putting on and taking off regular upper body clothing  2  -LE  --  --    Taking care of personal grooming (such as brushing teeth)  2  -LE  --  --    Eating meals  3  -LE  --  --    Score  11  -LE  --  --       Functional Assessment    Outcome Measure Options  --  AM-PAC 6 Clicks Daily Activity (OT)  -LE  --    Row Name 01/02/19 1700             How much help from another person do you currently need...    Turning from your back to your side while in flat bed without using bedrails?  3  -EF      Moving from lying on back to sitting on the side of a flat bed without bedrails?  3  -EF      Moving to and from a bed to a chair (including a wheelchair)?  3  -EF      Standing up from a chair using your arms (e.g., wheelchair, bedside chair)?  3  -EF      Climbing 3-5 steps with a railing?  1  -EF      To walk in hospital room?  2  -EF      AM-PAC 6 Clicks Score  15  -EF         Functional Assessment    Outcome Measure Options  AM-PAC 6 Clicks Basic Mobility (PT)  -EF        User Key  (r) = Recorded By, (t) = Taken By, (c) = Cosigned By    Initials Name Provider Type    Eve Ventura OTR Occupational Therapist    Lela Suárez, PT Physical Therapist    Kerri Baxter, PT Physical Therapist           Time Calculation:   Time Calculation- OT     Row Name 01/03/19 1448             Time Calculation- OT    OT Start Time  1422  -LE      OT Stop Time  1434  -LE      OT Time Calculation (min)  12 min  -LE      Total Timed Code Minutes- OT  12 minute(s)  -LE      OT Received On  01/03/19  -LE        User Key  (r) = Recorded By, (t) = Taken By,  (c) = Cosigned By    Initials Name Provider Type    Eve Ventura OTR Occupational Therapist           Therapy Suggested Charges     Code   Minutes Charges    None           Therapy Charges for Today     Code Description Service Date Service Provider Modifiers Qty    [id number]  OT THERAPEUTIC ACT EA 15 MIN 1/3/2019 Eve Coffey OTR GO 1               SANDRA Bryant  1/3/2019

## 2019-01-03 NOTE — PROGRESS NOTES
This is to help clarify this patient's treament schedule:  She is actually scheduled Jan 14 at 12:40 to see Dr. [place] and get labs for Opdivo.

## 2019-01-04 LAB
ANION GAP SERPL CALCULATED.3IONS-SCNC: 13.3 MMOL/L
BASOPHILS # BLD AUTO: 0.02 10*3/MM3 (ref 0–0.2)
BASOPHILS NFR BLD AUTO: 0.2 % (ref 0–1.5)
BUN BLD-MCNC: 20 MG/DL (ref 8–23)
BUN/CREAT SERPL: 48.8 (ref 7–25)
CALCIUM SPEC-SCNC: 9.6 MG/DL (ref 8.6–10.5)
CHLORIDE SERPL-SCNC: 95 MMOL/L (ref 98–107)
CO2 SERPL-SCNC: 24.7 MMOL/L (ref 22–29)
CREAT BLD-MCNC: 0.41 MG/DL (ref 0.57–1)
DEPRECATED RDW RBC AUTO: 41 FL (ref 37–54)
EOSINOPHIL # BLD AUTO: 0.03 10*3/MM3 (ref 0–0.7)
EOSINOPHIL NFR BLD AUTO: 0.3 % (ref 0.3–6.2)
ERYTHROCYTE [DISTWIDTH] IN BLOOD BY AUTOMATED COUNT: 12 % (ref 11.7–13)
GFR SERPL CREATININE-BSD FRML MDRD: >150 ML/MIN/1.73
GLUCOSE BLD-MCNC: 105 MG/DL (ref 65–99)
HCT VFR BLD AUTO: 39.1 % (ref 35.6–45.5)
HGB BLD-MCNC: 14.1 G/DL (ref 11.9–15.5)
IMM GRANULOCYTES # BLD AUTO: 0.02 10*3/MM3 (ref 0–0.03)
IMM GRANULOCYTES NFR BLD AUTO: 0.2 % (ref 0–0.5)
LYMPHOCYTES # BLD AUTO: 1.33 10*3/MM3 (ref 0.9–4.8)
LYMPHOCYTES NFR BLD AUTO: 15 % (ref 19.6–45.3)
MCH RBC QN AUTO: 34.2 PG (ref 26.9–32)
MCHC RBC AUTO-ENTMCNC: 36.1 G/DL (ref 32.4–36.3)
MCV RBC AUTO: 94.9 FL (ref 80.5–98.2)
MONOCYTES # BLD AUTO: 0.39 10*3/MM3 (ref 0.2–1.2)
MONOCYTES NFR BLD AUTO: 4.4 % (ref 5–12)
NEUTROPHILS # BLD AUTO: 7.08 10*3/MM3 (ref 1.9–8.1)
NEUTROPHILS NFR BLD AUTO: 80.1 % (ref 42.7–76)
PLATELET # BLD AUTO: 285 10*3/MM3 (ref 140–500)
PMV BLD AUTO: 9.5 FL (ref 6–12)
POTASSIUM BLD-SCNC: 4.2 MMOL/L (ref 3.5–5.2)
RBC # BLD AUTO: 4.12 10*6/MM3 (ref 3.9–5.2)
SODIUM BLD-SCNC: 133 MMOL/L (ref 136–145)
WBC NRBC COR # BLD: 8.85 10*3/MM3 (ref 4.5–10.7)

## 2019-01-04 PROCEDURE — 80048 BASIC METABOLIC PNL TOTAL CA: CPT | Performed by: HOSPITALIST

## 2019-01-04 PROCEDURE — 97110 THERAPEUTIC EXERCISES: CPT

## 2019-01-04 PROCEDURE — 85025 COMPLETE CBC W/AUTO DIFF WBC: CPT | Performed by: HOSPITALIST

## 2019-01-04 PROCEDURE — 97535 SELF CARE MNGMENT TRAINING: CPT

## 2019-01-04 RX ORDER — FUROSEMIDE 20 MG/1
20 TABLET ORAL DAILY
Status: DISCONTINUED | OUTPATIENT
Start: 2019-01-05 | End: 2019-01-06 | Stop reason: HOSPADM

## 2019-01-04 RX ADMIN — ASPIRIN 325 MG: 325 TABLET ORAL at 10:37

## 2019-01-04 RX ADMIN — SODIUM CHLORIDE, PRESERVATIVE FREE 3 ML: 5 INJECTION INTRAVENOUS at 20:36

## 2019-01-04 RX ADMIN — POTASSIUM CHLORIDE 40 MEQ: 1.5 POWDER, FOR SOLUTION ORAL at 10:37

## 2019-01-04 RX ADMIN — ATORVASTATIN CALCIUM 20 MG: 20 TABLET, FILM COATED ORAL at 20:25

## 2019-01-04 RX ADMIN — SODIUM CHLORIDE, PRESERVATIVE FREE 3 ML: 5 INJECTION INTRAVENOUS at 10:38

## 2019-01-04 RX ADMIN — FUROSEMIDE 20 MG: 20 TABLET ORAL at 10:38

## 2019-01-04 RX ADMIN — POTASSIUM CHLORIDE 40 MEQ: 1.5 POWDER, FOR SOLUTION ORAL at 20:25

## 2019-01-04 NOTE — PLAN OF CARE
Problem: Patient Care Overview  Goal: Plan of Care Review  Outcome: Ongoing (interventions implemented as appropriate)   01/04/19 1002   Coping/Psychosocial   Plan of Care Reviewed With patient   OTHER   Outcome Summary Encouragement for in bed grooming during OT. Pt demo mod/max difficulty with reaching, manipulating with B UE.

## 2019-01-04 NOTE — PLAN OF CARE
Problem: Patient Care Overview  Goal: Plan of Care Review  Outcome: Ongoing (interventions implemented as appropriate)   01/04/19 1708   Coping/Psychosocial   Plan of Care Reviewed With patient   Plan of Care Review   Progress improving   OTHER   Outcome Summary VSS, pt denies pain, code status changed to reflect pt's wishes, worked c/ OT & PT, met c/ Ghazala Mendez c/ palliative care, increased fluid restriction limits to 1500 cc / day, nephrology sign off, CCP assisting c/ DC plan, referrals in progress, CTM       Problem: Skin Injury Risk (Adult)  Goal: Skin Health and Integrity  Outcome: Ongoing (interventions implemented as appropriate)      Problem: Fall Risk (Adult)  Goal: Absence of Fall  Outcome: Ongoing (interventions implemented as appropriate)      Problem: Nutrition, Imbalanced: Inadequate Oral Intake (Adult)  Goal: Improved Oral Intake  Outcome: Ongoing (interventions implemented as appropriate)

## 2019-01-04 NOTE — THERAPY TREATMENT NOTE
Acute Care - Occupational Therapy Progress Note  [facility]     Patient Name: Sujata Waters  : 1955  MRN: [id number]  Today's Date: 2019  Onset of Illness/Injury or Date of Surgery: 18          Admit Date: 2018       ICD-10-CM ICD-9-CM   1. Altered mental status, unspecified altered mental status type R41.82 780.97   2. Primary malignant neoplasm of right lung metastatic to other site (CMS/HCC) C34.91 162.9   3. Hyponatremia E87.1 276.1   4. General weakness R53.1 780.79     Patient Active Problem List   Diagnosis   • AR (allergic rhinitis)   • Postherpetic neuralgia   • Prolapse of mitral valve   • Rosacea   • Smoking addiction   • Left adrenal mass (CMS/HCC)   • Right renal mass   • Uterine mass   • Basal cell carcinoma   • Shingles   • Left cervical lymphadenopathy   • Weight loss   • RUQ pain   • Renal cyst   • Lung mass   • Metastatic cancer (CMS/HCC)   • Small cell lung cancer (CMS/HCC)   • Adenocarcinoma (CMS/HCC)   • Fitting and adjustment of vascular catheter   • Altered mental status   • Hyponatremia   • Urine retention     Past Medical History:   Diagnosis Date   • Anxiety    • Basal cell carcinoma     Follows up with Dermatology annually, PA with Dr. Antoine's office   • Bowen's disease of vulva    • Depression    • History of kidney stone    • Left adrenal mass (CMS/HCC)    • Lung cancer (CMS/HCC)     Right lung with metastasis to neck node.   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 Roberto Calle, [facility]   • VENOUS ACCESS DEVICE (PORT) INSERTION Right 5/25/2018    Procedure: INSERTION OF PORTACATH;  Surgeon: Jelani Granger MD;  Location: [facility];  Service: General   • VITRECTOMY PARS PLANA      WITH REPAIR OF MACULAR HOLE   • VULVECTOMY N/A     partial, due to Bowen's Disease       Therapy Treatment    Rehabilitation Treatment Summary     Row Name 01/04/19 1005             Treatment Time/Intention    Discipline  occupational therapist  -LE      Document Type  therapy note (daily note)  -LE      Subjective Information  complains of;weakness;fatigue  -LE      Patient/Family Observations  fowlers  -LE      Comment  pt initially asks OT to return later.  OT encourages pt and gives options for treatment \"you choose\" pt repsonds'  -LE      Existing Precautions/Restrictions  fall  -LE      Recorded by [LE] Eve Coffey, OTR 01/04/19 1009      Row Name 01/04/19 1005             Vital Signs    O2 Delivery Pre Treatment  room air  -LE      Pre Patient Position  Supine  -LE      Intra Patient Position  Supine  -LE      Post Patient Position  Supine  -LE      Recorded by [LE] Eve Cfofey, OTR 01/04/19 1009      Row Name 01/04/19 1005             Bed Mobility Assessment/Treatment    Supine-Sit Sully (Bed Mobility)  -- pt declined attempt \"I'm not getting up\"  -LE      Recorded by [LE] Eve Coffey, OTR 01/04/19 1009      Row Name 01/04/19 1005             Grooming Assessment/Training    Sully Level (Grooming)  oral care regimen;set up;supervision;verbal cues  -LE      Grooming Position  sitting up in bed  -LE      Comment (Grooming)  increase time, effort.  mod/max difficulty manipulating, reaching, bringing basin to chin to spit.  Ed pt on looking at hands while using.    -LE      Recorded " "by [LE] Eve Coffey, OTR 01/04/19 1009      Row Name 01/04/19 1005             Self-Feeding Assessment/Training    Comment (Feeding)  pt reports \"didn't eat much\".  unsure how pt is feeding self.    -LE      Recorded by [LE] Eve Coffey, OTR 01/04/19 1009      Row Name 01/04/19 1005             Motor Skills Assessment/Interventions    Additional Documentation  Motor Coordination Assessment/Training (Group);Neuromuscular Re-education (Group)  -LE      Recorded by [LE] Eve Coffey, OTR 01/04/19 1009      Row Name 01/04/19 1005             Motor Coordination Assessment/Training    Comment, Fine Motor Coordination Training  intermittent writhing and claw like mvt B UE during grooming task.   -LE      Recorded by [LE] Eve Coffey, OTR 01/04/19 1009      Row Name 01/04/19 1005             Positioning and Restraints    Pre-Treatment Position  in bed  -LE      In Bed  fowlers;call light within reach;encouraged to call for assist;exit alarm on  -LE2      Recorded by [LE] Eev Coffey, OTR 01/04/19 1009  [LE2] Eve Coffey OTR 01/04/19 1011      Row Name 01/04/19 1005             Pain Scale: Numbers Pre/Post-Treatment    Pain Scale: Numbers, Pretreatment  0/10 - no pain  -LE      Recorded by [LE] Eve Coffey, OTR 01/04/19 1011      Row Name 01/04/19 1005             Sensory Assessment/Intervention    Additional Documentation  -- question pt if trouble feeling with hands. pt doesn't answer  -LE      Recorded by [LE] Eve Coffey, OTR 01/04/19 1011      Row Name                Wound 12/31/18 Right anterior wrist skin tear    Wound - Properties Group Date first assessed: 12/31/18 [AR] Present On Admission : no [AR] Side: Right [AR] Orientation: anterior [AR] Location: wrist [AR] Type: skin tear [AR] Recorded by:  [AR] Meena Cueto RN 12/31/18 1838    Row Name 01/04/19 1005             Plan of Care Review    Plan of Care Reviewed With  patient  -LE      Recorded by [LE] Eve Coffey, OTR 01/04/19 1011        User Key  (r) " = Recorded By, (t) = Taken By, (c) = Cosigned By    Initials Name Effective Dates Discipline    LUCERO Alexx Eve, OTR 06/08/18 -  OT    Meena Jones, RN 09/13/17 -  Nurse        Wound 12/31/18 Right anterior wrist skin tear (Active)   Dressing Appearance no drainage;dry 1/3/2019  9:00 PM   Closure DAPHNE 1/3/2019  2:01 PM   Base clean;red/granulating 1/3/2019  9:00 PM   Periwound intact 1/3/2019  9:00 PM   Periwound Temperature warm 1/3/2019  9:00 PM   Edges rolled/closed 1/3/2019  9:00 PM   Drainage Characteristics/Odor sanguineous 1/3/2019  2:01 PM   Drainage Amount none 1/3/2019  9:00 PM   Care, Wound cleansed with;sterile normal saline 1/3/2019  9:00 PM   Dressing Care, Wound other (see comments) 1/4/2019 12:58 AM       Occupational Therapy Education     Title: PT OT SLP Therapies (In Progress)     Topic: Occupational Therapy (Done)     Point: ADL training (Done)     Description: Instruct learner(s) on proper safety adaptation and remediation techniques during self care or transfers.   Instruct in proper use of assistive devices.    Learning Progress Summary           Patient Acceptance, E,TB, VU by AK at 12/28/2018  2:37 AM    Acceptance, E,TB, VU,NR by SG at 12/27/2018  1:45 PM    Comment:  safety for adls,   Family Acceptance, E,TB, VU,NR by SG at 12/27/2018  1:45 PM    Comment:  safety for adls,                   Point: Home exercise program (Done)     Description: Instruct learner(s) on appropriate technique for monitoring, assisting and/or progressing therapeutic exercises/activities.    Learning Progress Summary           Patient Acceptance, E,TB, VU by AK at 12/28/2018  2:37 AM                   Point: Precautions (Done)     Description: Instruct learner(s) on prescribed precautions during self-care and functional transfers.    Learning Progress Summary           Patient Acceptance, E,TB, VU by AK at 12/28/2018  2:37 AM                   Point: Body mechanics (Done)     Description: Instruct learner(s) on  proper positioning and spine alignment during self-care, functional mobility activities and/or exercises.    Learning Progress Summary           Patient Acceptance, E,TB, VU by AK at 12/28/2018  2:37 AM                               User Key     Initials Effective Dates Name Provider Type Discipline     06/08/18 -  Pippa De Dios, OTR Occupational Therapist OT    AK 03/17/17 -  Stephenie Yuen, RN Registered Nurse Nurse                OT Recommendation and Plan     Plan of Care Review  Plan of Care Reviewed With: patient  Plan of Care Reviewed With: patient  Outcome Summary: Encouragement for in bed grooming during OT.  Pt demo mod/max difficulty with reaching, manipulating with B UE.    Outcome Measures     Row Name 01/04/19 1002 01/04/19 1000 01/03/19 1436       How much help from another is currently needed...    Putting on and taking off regular lower body clothing?  1  -LE  --  1  -LE    Bathing (including washing, rinsing, and drying)  1  -LE  --  2  -LE    Toileting (which includes using toilet bed pan or urinal)  1  -LE  --  1  -LE    Putting on and taking off regular upper body clothing  2  -LE  --  2  -LE    Taking care of personal grooming (such as brushing teeth)  2  -LE  --  2  -LE    Eating meals  2  -LE  --  3  -LE    Score  9  -LE  --  11  -LE       Functional Assessment    Outcome Measure Options  --  AM-PAC 6 Clicks Daily Activity (OT)  -LE  --    Row Name 01/03/19 1400 01/03/19 1300 01/02/19 1700       How much help from another person do you currently need...    Turning from your back to your side while in flat bed without using bedrails?  --  3  -AR  3  -EF    Moving from lying on back to sitting on the side of a flat bed without bedrails?  --  2  -AR  3  -EF    Moving to and from a bed to a chair (including a wheelchair)?  --  2  -AR  3  -EF    Standing up from a chair using your arms (e.g., wheelchair, bedside chair)?  --  1  -AR  3  -EF    Climbing 3-5 steps with a railing?  --  1  -AR  1   -EF    To walk in hospital room?  --  1  -AR  2  -EF    AM-PAC 6 Clicks Score  --  10  -AR  15  -EF       Functional Assessment    Outcome Measure Options  AM-PAC 6 Clicks Daily Activity (OT)  -LE  --  AM-PAC 6 Clicks Basic Mobility (PT)  -EF      User Key  (r) = Recorded By, (t) = Taken By, (c) = Cosigned By    Initials Name Provider Type    Eve Ventura OTR Occupational Therapist    EF Lela Espino, PT Physical Therapist    AR Kerri Vang, PT Physical Therapist           Time Calculation:   Time Calculation- OT     Row Name 01/04/19 1011             Time Calculation- OT    OT Start Time  0947  -LE      OT Stop Time  1000  -LE      OT Time Calculation (min)  13 min  -LE      Total Timed Code Minutes- OT  13 minute(s)  -LE      OT Received On  01/04/19  -LE        User Key  (r) = Recorded By, (t) = Taken By, (c) = Cosigned By    Initials Name Provider Type    Eve Ventura OTAZAEL Occupational Therapist           Therapy Suggested Charges     Code   Minutes Charges    None           Therapy Charges for Today     Code Description Service Date Service Provider Modifiers Qty    [id number]  OT THERAPEUTIC ACT EA 15 MIN 1/3/2019 Eve Coffey OTR GO 1    [id number] HC OT SELF CARE/MGMT/TRAIN EA 15 MIN 1/4/2019 Eve Coffey OTR GO 1               SANDRA Bryant  1/4/2019

## 2019-01-04 NOTE — PLAN OF CARE
Problem: Patient Care Overview  Goal: Plan of Care Review   01/04/19 1523   Coping/Psychosocial   Plan of Care Reviewed With patient   OTHER   Outcome Summary Pt. able to work on bed mobility and static sitting balance this day although limited by pt.'s shaking after sitting EOB ~ 1 minute. V.S.S. stable throughout and pt. reports not feeling dizzy or light headed. Nursing notified.

## 2019-01-04 NOTE — THERAPY TREATMENT NOTE
"Acute Care - Physical Therapy Treatment Note  [facility]     Patient Name: Sujata Waters  : 1955  MRN: [id number]  Today's Date: 2019  Onset of Illness/Injury or Date of Surgery: 18  Date of Referral to PT: 18       Admit Date: 2018    Visit Dx:    ICD-10-CM ICD-9-CM   1. Altered mental status, unspecified altered mental status type R41.82 780.97   2. Primary malignant neoplasm of right lung metastatic to other site (CMS/HCC) C34.91 162.9   3. Hyponatremia E87.1 276.1   4. General weakness R53.1 780.79     Patient Active Problem List   Diagnosis   • AR (allergic rhinitis)   • Postherpetic neuralgia   • Prolapse of mitral valve   • Rosacea   • Smoking addiction   • Left adrenal mass (CMS/HCC)   • Right renal mass   • Uterine mass   • Basal cell carcinoma   • Shingles   • Left cervical lymphadenopathy   • Weight loss   • RUQ pain   • Renal cyst   • Lung mass   • Metastatic cancer (CMS/HCC)   • Small cell lung cancer (CMS/HCC)   • Adenocarcinoma (CMS/HCC)   • Fitting and adjustment of vascular catheter   • Altered mental status   • Hyponatremia   • Urine retention       Therapy Treatment    Rehabilitation Treatment Summary     Row Name 19 1516 19 1005          Treatment Time/Intention    Discipline  physical therapist  -MS  occupational therapist  -LE     Document Type  therapy note (daily note)  -MS  therapy note (daily note)  -LE     Subjective Information  complains of;fatigue;weakness  -MS  complains of;weakness;fatigue  -LE     Mode of Treatment  physical therapy;individual therapy  -MS  --     Patient/Family Observations  --  fowlers  -LE     Patient Effort  fair  -MS  --     Comment  Pt. reports feeling \"okay\" and agreeable to work with P.T.  -MS  pt initially asks OT to return later.  OT encourages pt and gives options for treatment \"you choose\" pt repsonds'  -LE     Existing Precautions/Restrictions  fall  (Significant)   -MS  fall  -LE     Treatment " "Considerations/Comments  Precautions taken to help minimize/eliminate all friction/shearing forces to pt.'s skin during mobility.   (Significant)   -MS  --     Recorded by [MS] Xavi Zaldivar, PT 01/04/19 1523 [LE] Eve Coffey, OTR 01/04/19 1009     Row Name 01/04/19 1005             Vital Signs    O2 Delivery Pre Treatment  room air  -LE      Pre Patient Position  Supine  -LE      Intra Patient Position  Supine  -LE      Post Patient Position  Supine  -LE      Recorded by [LE] Eve Coffey, OTR 01/04/19 1009      Row Name 01/04/19 1516             Cognitive Assessment/Intervention- PT/OT    Orientation Status (Cognition)  oriented x 3  -MS      Follows Commands (Cognition)  WNL  -MS      Personal Safety Interventions  fall prevention program maintained;gait belt;nonskid shoes/slippers when out of bed;supervised activity  -MS      Recorded by [MS] Xavi Zaldivar, PT 01/04/19 1523      Row Name 01/04/19 1516 01/04/19 1005          Bed Mobility Assessment/Treatment    Supine-Sit Hercules (Bed Mobility)  minimum assist (75% patient effort);2 person assist  -MS  -- pt declined attempt \"I'm not getting up\"  -LE     Sit-Supine Hercules (Bed Mobility)  moderate assist (50% patient effort);2 person assist  -MS  --     Comment (Bed Mobility)  Once sitting EOB, pt. was able to sit ~ 1 minute and then she began to shake and started to lay herself back in bed supine.  Once back in bed, pt.'s shaking stopped.  Pt. was concious throughout and verbal reporting \"no\" when asked if she was dizzy or light headed.  Nursing immediately notified.  V.S.S. throughout.  -MS  --     Recorded by [MS] Xavi Zaldivar, PT 01/04/19 1523 [LE] Eve Coffey, OTR 01/04/19 1009     Row Name 01/04/19 1005             Grooming Assessment/Training    Hercules Level (Grooming)  oral care regimen;set up;supervision;verbal cues  -LE      Grooming Position  sitting up in bed  -LE      Comment (Grooming)  increase time, effort.  mod/max " "difficulty manipulating, reaching, bringing basin to chin to spit.  Ed pt on looking at hands while using.    -LE      Recorded by [LE] Eve Coffey, OTR 01/04/19 1009      Row Name 01/04/19 1005             Self-Feeding Assessment/Training    Comment (Feeding)  pt reports \"didn't eat much\".  unsure how pt is feeding self.    -LE      Recorded by [LE] Eve Coffey, OTR 01/04/19 1009      Row Name 01/04/19 1005             Motor Skills Assessment/Interventions    Additional Documentation  Motor Coordination Assessment/Training (Group);Neuromuscular Re-education (Group)  -LE      Recorded by [LE] Eve Coffey, OTR 01/04/19 1009      Row Name 01/04/19 1005             Motor Coordination Assessment/Training    Comment, Fine Motor Coordination Training  intermittent writhing and claw like mvt B UE during grooming task.   -LE      Recorded by [LE] Eve Coffey, OTR 01/04/19 1009      Row Name 01/04/19 1516 01/04/19 1005          Positioning and Restraints    Pre-Treatment Position  in bed  -MS  in bed  -LE     Post Treatment Position  bed  -MS  --     In Bed  notified nsg;supine;call light within reach;encouraged to call for assist;exit alarm on All lines intact. V.S.S.  -MS  fowlers;call light within reach;encouraged to call for assist;exit alarm on  -LE2     Recorded by [MS] Xavi Zaldivar, PT 01/04/19 1523 [LE] Eve Coffey, OTR 01/04/19 1009  [LE2] vEe Coffey, OTR 01/04/19 1011     Row Name 01/04/19 1516 01/04/19 1005          Pain Scale: Numbers Pre/Post-Treatment    Pain Scale: Numbers, Pretreatment  0/10 - no pain No verbal/visual signs of pain  -MS  0/10 - no pain  -LE     Pain Scale: Numbers, Post-Treatment  0/10 - no pain  -MS  --     Recorded by [MS] Xavi Zaldivar, PT 01/04/19 1523 [LE] Eve Coffey, OTR 01/04/19 1011     Row Name 01/04/19 1005             Sensory Assessment/Intervention    Additional Documentation  -- question pt if trouble feeling with hands. pt doesn't answer  -LE      Recorded by " [LE] Eve Coffey, OTR 01/04/19 1011      Row Name                Wound 12/31/18 Right anterior wrist skin tear    Wound - Properties Group Date first assessed: 12/31/18 [AR] Present On Admission : no [AR] Side: Right [AR] Orientation: anterior [AR] Location: wrist [AR] Type: skin tear [AR] Recorded by:  [AR] Meena Cueto RN 12/31/18 1838    Row Name 01/04/19 1005             Plan of Care Review    Plan of Care Reviewed With  patient  -LE      Recorded by [LE] Eve Coffey, OTR 01/04/19 1011        User Key  (r) = Recorded By, (t) = Taken By, (c) = Cosigned By    Initials Name Effective Dates Discipline    LE Eve Coffey, OTR 06/08/18 -  OT    MS Zen Xavi JUAN JOSE, PT 04/03/18 -  PT    Meena Jones RN 09/13/17 -  Nurse          Wound 12/31/18 Right anterior wrist skin tear (Active)   Dressing Appearance no drainage;dry 1/3/2019  9:00 PM   Base clean;red/granulating 1/3/2019  9:00 PM   Periwound intact 1/3/2019  9:00 PM   Periwound Temperature warm 1/3/2019  9:00 PM   Edges rolled/closed 1/3/2019  9:00 PM   Drainage Amount none 1/3/2019  9:00 PM   Care, Wound cleansed with;sterile normal saline 1/3/2019  9:00 PM   Dressing Care, Wound other (see comments) 1/4/2019 12:58 AM           Physical Therapy Education     Title: PT OT SLP Therapies (In Progress)     Topic: Physical Therapy (In Progress)     Point: Mobility training (In Progress)     Learning Progress Summary           Patient Acceptance, E,D, NR by MS at 1/4/2019  3:23 PM    Acceptance, E, NR by AR at 1/3/2019  1:44 PM    Acceptance, E, NR,NL by EF at 1/2/2019  5:36 PM    Acceptance, E,D, NR by PC at 12/31/2018 11:30 AM    Acceptance, E,TB, VU,DU by CW at 12/30/2018  9:41 AM    Acceptance, E,TB, VU by CS at 12/29/2018 10:57 AM    Acceptance, E,TB, VU,DU by CW at 12/28/2018  9:01 AM    Acceptance, ANKIT ARROYO VU by AK at 12/28/2018  2:37 AM    Acceptance, CRUZ PHAM NR, DU by LATISHA at 12/27/2018  9:43 AM    Acceptance, JULIO ARROYO by EF at 12/26/2018  2:01 PM    Comment:   Dgt voiced understanding, pt needs reinforcement   Family Acceptance, E,TB, VU,DU by CW at 12/28/2018  9:01 AM    Acceptance, E, NR by EF at 12/26/2018  2:01 PM    Comment:  Dgt voiced understanding, pt needs reinforcement                   Point: Home exercise program (In Progress)     Learning Progress Summary           Patient Acceptance, E,D, NR by MS at 1/4/2019  3:23 PM    Acceptance, E, NR by AR at 1/3/2019  1:44 PM    Acceptance, E, NR,NL by EF at 1/2/2019  5:36 PM    Acceptance, E,TB, VU,DU by CW at 12/30/2018  9:41 AM    Acceptance, E,TB, VU by CS at 12/29/2018 10:57 AM    Acceptance, E,TB, VU,DU by CW at 12/28/2018  9:01 AM    Acceptance, E,TB, VU by AK at 12/28/2018  2:37 AM    Acceptance, TB,E, NR,DU by CW at 12/27/2018  9:43 AM    Acceptance, E, NR by EF at 12/26/2018  2:01 PM    Comment:  Dgt voiced understanding, pt needs reinforcement   Family Acceptance, E,TB, VU,DU by CW at 12/28/2018  9:01 AM    Acceptance, E, NR by EF at 12/26/2018  2:01 PM    Comment:  Dgt voiced understanding, pt needs reinforcement                   Point: Body mechanics (In Progress)     Learning Progress Summary           Patient Acceptance, E,D, NR by MS at 1/4/2019  3:23 PM    Acceptance, E, NR by AR at 1/3/2019  1:44 PM    Acceptance, E, NR,NL by EF at 1/2/2019  5:36 PM    Acceptance, E,D, NR by PC at 12/31/2018 11:30 AM    Acceptance, E,TB, VU,DU by CW at 12/30/2018  9:41 AM    Acceptance, E,TB, VU by CS at 12/29/2018 10:57 AM    Acceptance, E,TB, VU,DU by CW at 12/28/2018  9:01 AM    Acceptance, E,TB, VU by AK at 12/28/2018  2:37 AM    Acceptance, TB,E, NR,DU by CW at 12/27/2018  9:43 AM    Acceptance, E, NR by EF at 12/26/2018  2:01 PM    Comment:  Dgt voiced understanding, pt needs reinforcement   Family Acceptance, E,TB, VU,DU by CW at 12/28/2018  9:01 AM    Acceptance, E, NR by EF at 12/26/2018  2:01 PM    Comment:  Dgt voiced understanding, pt needs reinforcement                   Point: Precautions (In Progress)      Learning Progress Summary           Patient Acceptance, E,D, NR by MS at 1/4/2019  3:23 PM    Acceptance, E, NR by AR at 1/3/2019  1:44 PM    Acceptance, E, NR,NL by EF at 1/2/2019  5:36 PM    Acceptance, E,D, NR by PC at 12/31/2018 11:30 AM    Acceptance, E,TB, VU,DU by CW at 12/30/2018  9:41 AM    Acceptance, E,TB, VU by CS at 12/29/2018 10:57 AM    Acceptance, E,TB, VU,DU by CW at 12/28/2018  9:01 AM    Acceptance, E,TB, VU by AK at 12/28/2018  2:37 AM    Acceptance, TB,E, NR,DU by CW at 12/27/2018  9:43 AM    Acceptance, E, NR by EF at 12/26/2018  2:01 PM    Comment:  Dgt voiced understanding, pt needs reinforcement   Family Acceptance, E,TB, VU,DU by CW at 12/28/2018  9:01 AM    Acceptance, E, NR by EF at 12/26/2018  2:01 PM    Comment:  Dgt voiced understanding, pt needs reinforcement                               User Key     Initials Effective Dates Name Provider Type Discipline    EF 06/08/18 -  Lela Espino, PT Physical Therapist PT    PC 04/03/18 -  Kemi Jones, PT Physical Therapist PT    MS 04/03/18 -  Xavi Zaldivar, PT Physical Therapist PT    AR 04/03/18 -  Kerri Vang, PT Physical Therapist PT    CW 03/07/18 -  Freedom Jacobs, PTA Physical Therapy Assistant PT    AK 03/17/17 -  Stephenie Yuen, RN Registered Nurse Nurse     05/14/18 -  Gauatm Garcia, PT Physical Therapist PT                PT Recommendation and Plan     Plan of Care Reviewed With: patient  Outcome Summary: Pt. able to work on bed mobility and static sitting balance this day although limited by pt.'s shaking after sitting EOB ~ 1 minute.  V.S.S. stable throughout and pt. reports not feeling dizzy or light headed.  Nursing notified.  Outcome Measures     Row Name 01/04/19 1500 01/04/19 1002 01/04/19 1000       How much help from another person do you currently need...    Turning from your back to your side while in flat bed without using bedrails?  3  -MS  --  --    Moving from lying on back to sitting on  the side of a flat bed without bedrails?  2  -MS  --  --    Moving to and from a bed to a chair (including a wheelchair)?  2  -MS  --  --    Standing up from a chair using your arms (e.g., wheelchair, bedside chair)?  1  -MS  --  --    Climbing 3-5 steps with a railing?  1  -MS  --  --    To walk in hospital room?  1  -MS  --  --    AM-[facility] 6 Clicks Score  10  -MS  --  --       How much help from another is currently needed...    Putting on and taking off regular lower body clothing?  --  1  -LE  --    Bathing (including washing, rinsing, and drying)  --  1  -LE  --    Toileting (which includes using toilet bed pan or urinal)  --  1  -LE  --    Putting on and taking off regular upper body clothing  --  2  -LE  --    Taking care of personal grooming (such as brushing teeth)  --  2  -LE  --    Eating meals  --  2  -LE  --    Score  --  9  -LE  --       Functional Assessment    Outcome Measure Options  -[facility] 6 Clicks Basic Mobility (PT)  -MS  --  -[facility] 6 Clicks Daily Activity (OT)  -LE    Row Name 01/03/19 1436 01/03/19 1400 01/03/19 1300       How much help from another person do you currently need...    Turning from your back to your side while in flat bed without using bedrails?  --  --  3  -AR    Moving from lying on back to sitting on the side of a flat bed without bedrails?  --  --  2  -AR    Moving to and from a bed to a chair (including a wheelchair)?  --  --  2  -AR    Standing up from a chair using your arms (e.g., wheelchair, bedside chair)?  --  --  1  -AR    Climbing 3-5 steps with a railing?  --  --  1  -AR    To walk in hospital room?  --  --  1  -AR    -[facility] 6 Clicks Score  --  --  10  -AR       How much help from another is currently needed...    Putting on and taking off regular lower body clothing?  1  -LE  --  --    Bathing (including washing, rinsing, and drying)  2  -LE  --  --    Toileting (which includes using toilet bed pan or urinal)  1  -LE  --  --    Putting on and taking off regular upper  body clothing  2  -LE  --  --    Taking care of personal grooming (such as brushing teeth)  2  -LE  --  --    Eating meals  3  -LE  --  --    Score  11  -LE  --  --       Functional Assessment    Outcome Measure Options  --  AM-[facility] 6 Clicks Daily Activity (OT)  -LE  --    Row Name 01/02/19 1700             How much help from another person do you currently need...    Turning from your back to your side while in flat bed without using bedrails?  3  -EF      Moving from lying on back to sitting on the side of a flat bed without bedrails?  3  -EF      Moving to and from a bed to a chair (including a wheelchair)?  3  -EF      Standing up from a chair using your arms (e.g., wheelchair, bedside chair)?  3  -EF      Climbing 3-5 steps with a railing?  1  -EF      To walk in hospital room?  2  -EF      AM-[facility] 6 Clicks Score  15  -EF         Functional Assessment    Outcome Measure Options  AM-[facility] 6 Clicks Basic Mobility (PT)  -EF        User Key  (r) = Recorded By, (t) = Taken By, (c) = Cosigned By    Initials Name Provider Type    Eve Ventura, OTR Occupational Therapist    Lela Suárez, PT Physical Therapist    Xavi Villanueva, PT Physical Therapist    Kerri Baxter, PT Physical Therapist         Time Calculation:   PT Charges     Row Name 01/04/19 1524             Time Calculation    Start Time  1452  -MS      Stop Time  1505  -MS      Time Calculation (min)  13 min  -MS      PT Received On  01/04/19  -MS      PT - Next Appointment  01/05/19  -MS         Time Calculation- PT    Total Timed Code Minutes- PT  11 minute(s)  -MS        User Key  (r) = Recorded By, (t) = Taken By, (c) = Cosigned By    Initials Name Provider Type    Xavi Villanueva, PT Physical Therapist        Therapy Suggested Charges     Code   Minutes Charges    None           Therapy Charges for Today     Code Description Service Date Service Provider Modifiers Qty    [id number]  PT THER PROC EA 15 MIN 1/4/2019 Zen  Xavi INGRAM, PT GP 1    [id number]  PT THER SUPP EA 15 MIN 1/4/2019 Xavi Zaldivar, PT GP 1          PT G-Codes  Outcome Measure Options: AM-[facility] 6 Clicks Basic Mobility (PT)  AM-[facility] 6 Clicks Score: 10  Score: 9    Xavi Zaldivar, PT  1/4/2019

## 2019-01-04 NOTE — PROGRESS NOTES
Continued Stay Note  [facility]     Patient Name: Sujata Waters  MRN: [id number]  Today's Date: 1/4/2019    Admit Date: 12/24/2018    Discharge Plan     Row Name 01/04/19 1605       Plan    Plan  [facility] private pay  IC level of care    Patient/Family in Agreement with Plan  yes    Plan Comments  Recieved vm from Vernell, that 1st choice is [facility] after touring facility. CCP spoke with Marilee/Joss[facility] and pt was accepted and bed available on Sunday. Packet in chart cubby. ashwin rn/ccp    Row Name 01/04/19 1110       Plan    Plan  Referral to [facility] and [facility] private pay    Patient/Family in Agreement with Plan  yes    Plan Comments  Recieved inbound call from pts sister Vernell, explaination/clarification of medicaid vs medicaid pending vs skilled vs private pay, she states she believes pt will not qualify for medicaid due to over income. She requests first choices to [facility] and [facility] for private pay options. Spoke with Angela Bingham/Kevin for [facility] and cost per month is $9-10,000 a month and 2 year waiting list for medicaid pending. Spoke with Marilee/Jossy [facility] and she states facility discussed with pts sister regarding touring and bed availablity for private pay. Marilee/Jossy would confirm bed availablity for weekend. CCP called pts sister Vernell requesting call back, then MD is ready for dc once placement arranged. Ghazala/Jessica has spoken with pt, requested if she is able to reach out to pts sister Vernell, as Vernell requested her to speak with. Awaiting Referrals from other SNFs and recieved denials from [facility], [place], [place], [facility], [facility], [facility] and [facility]. Packet in CCP office. Ashwin MAYEN/CCP        Discharge Codes    No documentation.             Allie Dueñas, RN

## 2019-01-04 NOTE — PROGRESS NOTES
[facility]  Nicola Solitario MD     LOS: 11 days   Patient Care Team:  Bernie Francois MD as PCP - General (Family Medicine)  Leyla Canada MD as Consulting Physician (Hematology and Oncology)  Douglas Long MD as Consulting Physician (Hematology and Oncology)        Subjective     Interval History:     Patient Complaints: Weak, still cannot void.  Requiring intermittent catheterization.    History taken from: patient chart    Review of Systems:    All systems were reviewed and were negative except for Jaycee.  Cannot ambulate.  Decreased appetite.  No chest pain.  No shortness of air.  No abdominal pain.    Objective     Vital Signs  Temp:  [97.9 °F (36.6 °C)-98.3 °F (36.8 °C)] 97.9 °F (36.6 °C)  Heart Rate:  [65-75] 67  Resp:  [16-18] 18  BP: ()/(59-79) 119/79    Physical Exam:   Physical Exam:     General Appearance:    Alert, cooperative, in no acute distress   Head:    Normocephalic, without obvious abnormality, atraumatic   Eyes:            Lids and lashes normal, conjunctivae and sclerae normal, no   icterus, no pallor, corneas clear, PERRLA   Ears:    Ears appear intact with no abnormalities noted   Throat:   No oral lesions, no thrush, oral mucosa moist   Neck:   No adenopathy, supple, trachea midline,   Back:     No kyphosis present, no scoliosis present, no skin lesions,       erythema or scars, no tenderness to percussion or                   palpation,   range of motion normal   Lungs:     Clear to auscultation,respirations regular, even and                   unlabored    Heart:    Regular rhythm and normal rate, normal S1 and S2, no            murmur, no gallop, no rub, no click   Breast Exam:    Deferred   Abdomen:     Normal bowel sounds, no masses, no organomegaly, soft        non-tender, non-distended, no guarding, no rebound                 tenderness   Genitalia:    Deferred   Extremities:   Moves all extremities well, no edema, no cyanosis, no               redness       Skin:   No bleeding, bruising or rash       Neurologic:   Cranial nerves 2 - 12 grossly intact, sensation intact,         Results Review:     I reviewed the patient's new clinical results.    Recent Results (from the past 24 hour(s))   Basic Metabolic Panel    Collection Time: 01/04/19  4:55 AM   Result Value Ref Range    Glucose 105 (H) 65 - 99 mg/dL    BUN 20 8 - 23 mg/dL    Creatinine 0.41 (L) 0.57 - 1.00 mg/dL    Sodium 133 (L) 136 - 145 mmol/L    Potassium 4.2 3.5 - 5.2 mmol/L    Chloride 95 (L) 98 - 107 mmol/L    CO2 24.7 22.0 - 29.0 mmol/L    Calcium 9.6 8.6 - 10.5 mg/dL    eGFR Non African Amer >150 >60 mL/min/1.73    BUN/Creatinine Ratio 48.8 (H) 7.0 - 25.0    Anion Gap 13.3 mmol/L   CBC Auto Differential    Collection Time: 01/04/19  4:55 AM   Result Value Ref Range    WBC 8.85 4.50 - 10.70 10*3/mm3    RBC 4.12 3.90 - 5.20 10*6/mm3    Hemoglobin 14.1 11.9 - 15.5 g/dL    Hematocrit 39.1 35.6 - 45.5 %    MCV 94.9 80.5 - 98.2 fL    MCH 34.2 (H) 26.9 - 32.0 pg    MCHC 36.1 32.4 - 36.3 g/dL    RDW 12.0 11.7 - 13.0 %    RDW-SD 41.0 37.0 - 54.0 fl    MPV 9.5 6.0 - 12.0 fL    Platelets 285 140 - 500 10*3/mm3    Neutrophil % 80.1 (H) 42.7 - 76.0 %    Lymphocyte % 15.0 (L) 19.6 - 45.3 %    Monocyte % 4.4 (L) 5.0 - 12.0 %    Eosinophil % 0.3 0.3 - 6.2 %    Basophil % 0.2 0.0 - 1.5 %    Immature Grans % 0.2 0.0 - 0.5 %    Neutrophils, Absolute 7.08 1.90 - 8.10 10*3/mm3    Lymphocytes, Absolute 1.33 0.90 - 4.80 10*3/mm3    Monocytes, Absolute 0.39 0.20 - 1.20 10*3/mm3    Eosinophils, Absolute 0.03 0.00 - 0.70 10*3/mm3    Basophils, Absolute 0.02 0.00 - 0.20 10*3/mm3    Immature Grans, Absolute 0.02 0.00 - 0.03 10*3/mm3       Medication Review:   Current Facility-Administered Medications:   •  aspirin tablet 325 mg, 325 mg, Oral, Daily, 325 mg at 01/03/19 1003 **OR** aspirin suppository 300 mg, 300 mg, Rectal, Daily, Jo Harp MD, 300 mg at 12/24/18 9912  •  atorvastatin (LIPITOR) tablet 20 mg,  20 mg, Oral, Nightly, Jo Harp MD, 20 mg at 01/03/19 2255  •  furosemide (LASIX) tablet 20 mg, 20 mg, Oral, BID, Kevin Faust MD, 20 mg at 01/03/19 1904  •  hydrocortisone 1 % cream 1 application, 1 application, Topical, TID PRN, Marsha Hameed MD  •  hydrOXYzine (VISTARIL) capsule 25 mg, 25 mg, Oral, TID PRN, Neftali Dowd MD, 25 mg at 12/27/18 1833  •  Magnesium Sulfate 2 gram Bolus, followed by 8 gram infusion (total Mg dose 10 grams)- Mg less than or equal to 1mg/dL, 2 g, Intravenous, PRN **OR** Magnesium Sulfate 2 gram / 50mL Infusion (GIVE X 3 BAGS TO EQUAL 6GM TOTAL DOSE) - Mg 1.1 - 1.5 mg/dl, 2 g, Intravenous, PRN, Last Rate: 25 mL/hr at 12/28/18 0531, 2 g at 12/28/18 0531 **OR** Magnesium Sulfate 4 gram infusion- Mg 1.6-1.9 mg/dL, 4 g, Intravenous, PRN, Kevin Faust MD  •  nicotine (NICODERM CQ) 21 MG/24HR patch 1 patch, 1 patch, Transdermal, Q24H, Neftali Dowd MD, Stopped at 12/27/18 2134  •  nitroglycerin (NITROSTAT) SL tablet 0.4 mg, 0.4 mg, Sublingual, Q5 Min PRN, Neftali Dowd MD  •  potassium chloride (KLOR-CON) packet 40 mEq, 40 mEq, Oral, PRN, Neftali Dowd MD, 40 mEq at 12/29/18 1707  •  potassium chloride (MICRO-K) CR capsule 40 mEq, 40 mEq, Oral, PRN **OR** potassium chloride (KLOR-CON) packet 40 mEq, 40 mEq, Oral, PRN **OR** potassium chloride 10 mEq in 100 mL IVPB, 10 mEq, Intravenous, Q1H PRN, Neftali Dowd MD  •  potassium chloride (KLOR-CON) packet 40 mEq, 40 mEq, Oral, BID, Mariely Harman MD, 40 mEq at 01/03/19 2251  •  potassium chloride (MICRO-K) CR capsule 40 mEq, 40 mEq, Oral, PRN, Neftali Dowd MD, 40 mEq at 01/02/19 1748  •  potassium chloride (MICRO-K) CR capsule 40 mEq, 40 mEq, Oral, Once, Kevin Faust MD  •  [COMPLETED] potassium chloride 10 mEq in 100 mL IVPB, 10 mEq, Intravenous, Once, Last Rate: 100 mL/hr at 12/24/18 2304, 10 mEq at 12/24/18 2304 **AND** [COMPLETED] potassium chloride 10 mEq in 100 mL IVPB, 10 mEq, Intravenous, Once,  Last Rate: 100 mL/hr at 12/25/18 0019, 10 mEq at 12/25/18 0019 **AND** [COMPLETED] potassium chloride 10 mEq in 100 mL IVPB, 10 mEq, Intravenous, Once, Last Rate: 100 mL/hr at 12/25/18 0130, 10 mEq at 12/25/18 0130 **AND** [COMPLETED] potassium chloride 10 mEq in 100 mL IVPB, 10 mEq, Intravenous, Once, Last Rate: 100 mL/hr at 12/25/18 0227, 10 mEq at 12/25/18 0227 **AND** [COMPLETED] potassium chloride 10 mEq in 100 mL IVPB, 10 mEq, Intravenous, Once, Last Rate: 100 mL/hr at 12/25/18 0421, 10 mEq at 12/25/18 0421 **AND** potassium chloride 10 mEq in 100 mL IVPB, 10 mEq, Intravenous, Once **AND** Potassium, , , PRN, Yuridia De La Cruz PA  •  potassium chloride 10 mEq in 100 mL IVPB, 10 mEq, Intravenous, Q1H PRN, Gary Jaime MD, Last Rate: 100 mL/hr at 12/26/18 0159, 10 mEq at 12/26/18 0159  •  sodium chloride 0.9 % flush 10 mL, 10 mL, Intravenous, PRN, Yuridia De La Cruz PA  •  sodium chloride 0.9 % flush 3 mL, 3 mL, Intravenous, Q12H, Jo Harp MD, 3 mL at 01/03/19 2252  •  sodium chloride 0.9 % flush 3-10 mL, 3-10 mL, Intravenous, PRN, Jo Harp MD  •  zolpidem (AMBIEN) tablet 5 mg, 5 mg, Oral, Nightly PRN, Neftali Dowd MD, 5 mg at 01/02/19 2049    Assessment/Plan       Altered mental status    Metastatic cancer (CMS/HCC)    Small cell lung cancer (CMS/HCC)    Hyponatremia    Urine retention          Plan for disposition:Evidently patient may be discharged to rehabilitation soon.  She cannot void.  She probably will have to have a catheter replaced.    Nicola Solitario MD  01/04/19  9:04 AM      Time: 15

## 2019-01-04 NOTE — CONSULTS
Purpose of the visit was to evaluate for: goals of care/advanced care planning, pain/symptom management and comfort care. Spoke with RN as well as patient and discussed palliative care, goals of care, care options, resuscitation status and discharge options.      Assessment:  Patient is palliative care appropriate due to metastatic lung cancer, recent chemo, radiation and now starting Opdivo. Mild shortness of air and weakness, little pain, more discomfort feeling. States stronger today. PPS 50%.     Recommendations/Plan: Change Code Status to No CPR.  States she has a living will, we will try to locate it and add to Epic. She wants to try Rehab at a SNF and hopefully return home.    Other Comments: Talked with patient about disease understanding, treatment plans, goals of care. She thought she was a No CPR, but no one has asked her.  We will try to locate her LW as she says she has given to us many times. States just discomfort and mild shortness of air, weakness and little appetite, however, she has never eaten much. Spent time reminiscing about her daughter, college and her accomplishments.States she travels a lot, but can be home if needed. States her disease is terminal and wants to be comfortable while she is continuing with Opdivo and other treatments if appropriate. Discussed when she may want to consider hospice care and how to contact them. Appreciative of information.     Total time: 45 minutes.

## 2019-01-04 NOTE — PROGRESS NOTES
"DAILY PROGRESS NOTE  [facility]    Patient Identification:  Name: Sujata Waters  Age: 63 y.o.  Sex: female  :  1955  MRN: [id number]         Primary Care Physician: Bernie Francois MD    Subjective:  Interval History:She feels better today.  No new complaints. Still very weak    Objective:    Scheduled Meds:    aspirin 325 mg Oral Daily   Or      aspirin 300 mg Rectal Daily   atorvastatin 20 mg Oral Nightly   furosemide 20 mg Oral BID   nicotine 1 patch Transdermal Q24H   potassium chloride 40 mEq Oral BID   potassium chloride 40 mEq Oral Once   potassium chloride 10 mEq Intravenous Once   sodium chloride 3 mL Intravenous Q12H     Continuous Infusions:     Vital signs in last 24 hours:  Temp:  [97.9 °F (36.6 °C)-98.3 °F (36.8 °C)] 97.9 °F (36.6 °C)  Heart Rate:  [65-75] 67  Resp:  [16-18] 18  BP: ()/(59-79) 119/79    Intake/Output:    Intake/Output Summary (Last 24 hours) at 2019 1335  Last data filed at 2019 0455  Gross per 24 hour   Intake 100 ml   Output 1075 ml   Net -975 ml       Exam:  /79 (BP Location: Right arm, Patient Position: Sitting)   Pulse 67   Temp 97.9 °F (36.6 °C) (Oral)   Resp 18   Ht 162.6 cm (64\")   Wt 34.5 kg (76 lb 0.9 oz)   SpO2 98%   BMI 13.06 kg/m²     General Appearance:    Alert, cooperative, no distress   Head:    Normocephalic, without obvious abnormality, atraumatic   Eyes:       Throat:   Lips, tongue, gums normal   Neck:   Supple, symmetrical, trachea midline, no JVD   Lungs:     Clear to auscultation bilaterally, respirations unlabored   Chest Wall:    No tenderness or deformity    Heart:    Regular rate and rhythm, S1 and S2 normal, no murmur,no  Rub or gallop   Abdomen:     Soft, non-tender, bowel sounds active, no masses, no organomegaly    Extremities:   Extremities normal, atraumatic, no cyanosis or edema   Pulses:      Skin:   Skin is warm and dry,  no rashes or palpable lesions   Neurologic:   no focal " deficits noted      Lab Results (last 72 hours)     Procedure Component Value Units Date/Time    Sodium [[id number]]  (Abnormal) Collected:  12/25/18 1423    Specimen:  Blood Updated:  12/25/18 1445     Sodium 130 mmol/L     Potassium [[id number]]  (Abnormal) Collected:  12/25/18 1423    Specimen:  Blood Updated:  12/25/18 1445     Potassium 3.1 mmol/L     Lipid Panel [[id number]] Collected:  12/25/18 0639    Specimen:  Blood Updated:  12/25/18 0851     Total Cholesterol 141 mg/dL      Triglycerides 61 mg/dL      HDL Cholesterol 57 mg/dL      LDL Cholesterol  72 mg/dL      VLDL Cholesterol 12.2 mg/dL      LDL/HDL Ratio 1.26    Narrative:       Cholesterol Reference Ranges  (U.S. Department of Health and Human Services ATP III Classifications)    Desirable          <200 mg/dL  Borderline High    200-239 mg/dL  High Risk          >240 mg/dL      Triglyceride Reference Ranges  (U.S. Department of Health and Human Services ATP III Classifications)    Normal           <150 mg/dL  Borderline High  150-199 mg/dL  High             200-499 mg/dL  Very High        >500 mg/dL    HDL Reference Ranges  (U.S. Department of Health and Human Services ATP III Classifcations)    Low     <40 mg/dl (major risk factor for CHD)  High    >60 mg/dl ('negative' risk factor for CHD)        LDL Reference Ranges  (U.S. Department of Health and Human Services ATP III Classifcations)    Optimal          <100 mg/dL  Near Optimal     100-129 mg/dL  Borderline High  130-159 mg/dL  High             160-189 mg/dL  Very High        >189 mg/dL    Sodium [[id number]]  (Abnormal) Collected:  12/25/18 0639    Specimen:  Blood Updated:  12/25/18 0851     Sodium 129 mmol/L     Hemoglobin A1c [[id number]]  (Normal) Collected:  12/25/18 0639    Specimen:  Blood Updated:  12/25/18 0842     Hemoglobin A1C 4.90 %     Narrative:       Hemoglobin A1C Ranges:    Increased Risk for Diabetes  5.7% to 6.4%  Diabetes                     >= 6.5%  Diabetic Goal                 < 7.0%    Urinalysis With Culture If Indicated - Urine, Catheter In/Out [[id number]]  (Abnormal) Collected:  12/25/18 0652    Specimen:  Urine, Catheter In/Out Updated:  12/25/18 0837     Color, UA Yellow     Appearance, UA Clear     pH, UA 6.0     Specific Gravity, UA >=1.030     Glucose, UA Negative     Ketones, UA 80 mg/dL (3+)     Bilirubin, UA Negative     Blood, UA Negative     Protein, UA Negative     Leuk Esterase, UA Negative     Nitrite, UA Negative     Urobilinogen, UA 1.0 E.U./dL    Narrative:       Urine microscopic not indicated.    Osmolality, Urine - Urine, Catheter In/Out [[id number]] Collected:  12/25/18 0652    Specimen:  Urine, Catheter In/Out Updated:  12/25/18 0834     Osmolality, Urine 485 mOsm/kg     Narrative:       Osmo Normal Reference Ranges:    Random:  mOsm/kg H2O, depending on fluid intake.  Random: >850 mOsm/kg H20, after 12 hour fluid restriction.    24 Hour: 300-900 mOsm/kg H2O.    POC Glucose Once [[id number]]  (Normal) Collected:  12/25/18 0624    Specimen:  Blood Updated:  12/25/18 0626     Glucose 80 mg/dL     Sodium [[id number]]  (Abnormal) Collected:  12/24/18 2349    Specimen:  Blood Updated:  12/25/18 0040     Sodium 128 mmol/L     Cortisol [[id number]]  (Normal) Collected:  12/24/18 2130    Specimen:  Blood Updated:  12/24/18 2243     Cortisol 28.50 mcg/dL     Narrative:       Cortisol Reference Ranges:    Cortisol 6AM - 10AM Range: 6.02-18.40 mcg/dl  Cortisol 4PM - 8PM Range: 2.68-10.50 mcg/dl  Critical AM/PM:    Less than 2 mcg/dl    Sodium [[id number]]  (Abnormal) Collected:  12/24/18 2130    Specimen:  Blood Updated:  12/24/18 2201     Sodium 127 mmol/L     Sodium, Urine, Random - Urine, Catheter [[id number]] Collected:  12/24/18 1717    Specimen:  Urine, Catheter Updated:  12/24/18 1922     Sodium, Urine 24 mmol/L     Narrative:       Reference intervals for random urine have not been established.  Clinical usage is dependent upon physician's interpretation in  combination with other laboratory tests.     Osmolality, Serum [[id number]]  (Abnormal) Collected:  12/24/18 1624    Specimen:  Blood Updated:  12/24/18 1901     Osmolality 263 mOsm/kg     CBC & Differential [[id number]] Collected:  12/24/18 1624    Specimen:  Blood Updated:  12/24/18 1750    Narrative:       The following orders were created for panel order CBC & Differential.  Procedure                               Abnormality         Status                     ---------                               -----------         ------                     Scan Slide[[id number]]                   Normal              Final result               CBC Auto Differential[[id number]]        Abnormal            Final result                 Please view results for these tests on the individual orders.    Scan Slide [[id number]]  (Normal) Collected:  12/24/18 1624    Specimen:  Blood Updated:  12/24/18 1750     RBC Morphology Normal     WBC Morphology Normal     Platelet Morphology Normal    CBC Auto Differential [[id number]]  (Abnormal) Collected:  12/24/18 1624    Specimen:  Blood Updated:  12/24/18 1750     WBC 9.73 10*3/mm3      RBC 4.16 10*6/mm3      Hemoglobin 14.0 g/dL      Comment: ESTIMATED HGB        Hematocrit 42.0 %      Comment: SPUN HCT        .9 fL      MCH 33.7 pg      MCHC 33.3 g/dL      RDW 11.9 %      RDW-SD 40.0 fl      MPV 9.3 fL      Platelets 283 10*3/mm3      Neutrophil % 83.2 %      Lymphocyte % 16.0 %      Monocyte % 0.6 %      Eosinophil % 0.0 %      Basophil % 0.0 %      Immature Grans % 0.2 %      Neutrophils, Absolute 8.09 10*3/mm3      Lymphocytes, Absolute 1.56 10*3/mm3      Monocytes, Absolute 0.06 10*3/mm3      Eosinophils, Absolute 0.00 10*3/mm3      Basophils, Absolute 0.00 10*3/mm3      Immature Grans, Absolute 0.02 10*3/mm3     TSH [[id number]]  (Normal) Collected:  12/24/18 1624    Specimen:  Blood Updated:  12/24/18 1748     TSH 0.659 mIU/mL     [place] Draw [[id number]] Collected:  12/24/18  1624    Specimen:  Blood Updated:  12/24/18 1745    Narrative:       The following orders were created for panel order [place] Draw.  Procedure                               Abnormality         Status                     ---------                               -----------         ------                     Light Blue Top[[id number]]                                   Final result               Green Top (Gel)[[id number]]                                  Final result               Lavender Top[[id number]]                                     Final result               Gold Top - SST[[id number]]                                   Final result                 Please view results for these tests on the individual orders.    Lavender Top [[id number]] Collected:  12/24/18 1624    Specimen:  Blood Updated:  12/24/18 1745     Extra Tube hold for add-on     Comment: Auto resulted       Urinalysis With Microscopic If Indicated (No Culture) - Urine, Catheter [[id number]]  (Abnormal) Collected:  12/24/18 1717    Specimen:  Urine, Catheter Updated:  12/24/18 1736     Color, UA Yellow     Appearance, UA Clear     pH, UA 6.5     Specific Gravity, UA 1.015     Glucose, UA Negative     Ketones, UA 40 mg/dL (2+)     Bilirubin, UA Negative     Blood, UA Negative     Protein, UA Negative     Leuk Esterase, UA Negative     Nitrite, UA Negative     Urobilinogen, UA 0.2 E.U./dL    Narrative:       Urine microscopic not indicated.    Light Blue Top [[id number]] Collected:  12/24/18 1624    Specimen:  Blood Updated:  12/24/18 1730     Extra Tube hold for add-on     Comment: Auto resulted       Gold Top - SST [[id number]] Collected:  12/24/18 1624    Specimen:  Blood Updated:  12/24/18 1730     Extra Tube Hold for add-ons.     Comment: Auto resulted.       Green Top (Gel) [[id number]] Collected:  12/24/18 1624    Specimen:  Blood Updated:  12/24/18 1730     Extra Tube Hold for add-ons.     Comment: Auto resulted.       Vitamin B12 [[id number]]   (Abnormal) Collected:  12/24/18 1624    Specimen:  Blood Updated:  12/24/18 1717     Vitamin B-12 1,655 pg/mL     Comprehensive Metabolic Panel [[id number]]  (Abnormal) Collected:  12/24/18 1624    Specimen:  Blood Updated:  12/24/18 1656     Glucose 90 mg/dL      BUN 21 mg/dL      Creatinine 0.62 mg/dL      Sodium 123 mmol/L      Potassium 3.1 mmol/L      Chloride 82 mmol/L      CO2 22.8 mmol/L      Calcium 9.8 mg/dL      Total Protein 7.2 g/dL      Albumin 4.30 g/dL      ALT (SGPT) 14 U/L      AST (SGOT) 25 U/L      Alkaline Phosphatase 61 U/L      Total Bilirubin 0.9 mg/dL      eGFR Non African Amer 97 mL/min/1.73      Globulin 2.9 gm/dL      A/G Ratio 1.5 g/dL      BUN/Creatinine Ratio 33.9     Anion Gap 18.2 mmol/L     Troponin [[id number]]  (Normal) Collected:  12/24/18 1624    Specimen:  Blood Updated:  12/24/18 1656     Troponin T <0.010 ng/mL     Narrative:       Troponin T Reference Ranges:  Less than 0.03 ng/mL:    Negative for AMI  0.03 to 0.09 ng/mL:      Indeterminant for AMI  Greater than 0.09 ng/mL: Positive for AMI    Protime-INR [[id number]]  (Normal) Collected:  12/24/18 1624    Specimen:  Blood Updated:  12/24/18 1647     Protime 13.5 Seconds      INR 1.05        Data Review:  Results from last 7 days   Lab Units  01/04/19 0455 01/03/19 0617 01/02/19 2328 01/02/19   0516   SODIUM mmol/L  133*  133*   --   130*   POTASSIUM mmol/L  4.2  3.9  4.4  3.0*   CHLORIDE mmol/L  95*  93*   --   91*   CO2 mmol/L  24.7  28.8   --   26.1   BUN mg/dL  20 19   --   19   CREATININE mg/dL  0.41*  0.39*   --   0.38*   GLUCOSE mg/dL  105*  112*   --   99   CALCIUM mg/dL  9.6  9.4   --   9.2     Results from last 7 days   Lab Units  01/04/19 0455 01/03/19 0617 01/02/19   0516   WBC 10*3/mm3  8.85  10.71*  6.62   HEMOGLOBIN g/dL  14.1  13.3  13.2   HEMATOCRIT %  39.1  36.7  37.2   PLATELETS 10*3/mm3  285  302  276             Lab Results   Lab Value Date/Time    TROPONINT <0.010 12/24/2018 8784                Invalid input(s): PROT, LABALBU          No results found for: POCGLU        Past Medical History:   Diagnosis Date   • Anxiety    • Basal cell carcinoma     Follows up with Dermatology annually, PA with Dr. Antoine's office   • Bowen's disease of vulva    • Depression    • History of kidney stone 2018   • Left adrenal mass (CMS/HCC)    • Lung cancer (CMS/HCC)     Right lung with metastasis to neck node.   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       Plan:  nephrology consult noted. Neurology consult noted and oncology. Correct sodium.   urology consult and intermitant cath for urine retention. Fluid restriction. Follow up labs.    PT negin. Encourage to work with PT. Will need SNU for rehab.  She is too weak to go home.  DC planning.  In process. Palliative care consult. [place] ethel. No CPR for now.    Neftali Dowd MD  1/4/2019  1:35 PM

## 2019-01-04 NOTE — PROGRESS NOTES
"   LOS: 11 days    Patient Care Team:  Bernie Francois MD as PCP - General (Family Medicine)  Leyla Canada MD as Consulting Physician (Hematology and Oncology)  Douglas Long MD as Consulting Physician (Hematology and Oncology)    Chief Complaint:    Chief Complaint   Patient presents with   • Altered Mental Status     Follow UP hypoNa  Subjective     Interval History:   Tolerating current level of fluid restriction (thinks not approaching 1200cc/day)  Objective     Vital Signs  Temp:  [97.9 °F (36.6 °C)-98.1 °F (36.7 °C)] 97.9 °F (36.6 °C)  Heart Rate:  [65-75] 72  Resp:  [16-18] 18  BP: ()/(59-79) 100/66    Flowsheet Rows      First Filed Value   Admission Height  162.6 cm (64\") Documented at 12/24/2018 1517   Admission Weight  42.7 kg (94 lb 1.6 oz) Documented at 12/24/2018 1517          No intake/output data recorded.  I/O last 3 completed shifts:  In: 100 [P.O.:100]  Out: 2025 [Urine:2025]    Intake/Output Summary (Last 24 hours) at 1/4/2019 1424  Last data filed at 1/4/2019 0455  Gross per 24 hour   Intake 100 ml   Output 1075 ml   Net -975 ml       Physical Exam:  gen cachectic WF in no distress  Neck supple no JVD  Lungs CTA bilat no rales  CV RRR   abd soft NT/ND  Vasc no pedal edema 2 +radial pulses      Results Review:    Results from last 7 days   Lab Units  01/04/19   0455  01/03/19   0617  01/02/19   2328  01/02/19   0516   SODIUM mmol/L  133*  133*   --   130*   POTASSIUM mmol/L  4.2  3.9  4.4  3.0*   CHLORIDE mmol/L  95*  93*   --   91*   CO2 mmol/L  24.7  28.8   --   26.1   BUN mg/dL  20 19   --   19   CREATININE mg/dL  0.41*  0.39*   --   0.38*   CALCIUM mg/dL  9.6  9.4   --   9.2   GLUCOSE mg/dL  105*  112*   --   99       Estimated Creatinine Clearance: 38.2 mL/min (A) (by C-G formula based on SCr of 0.41 mg/dL (L)).    Results from last 7 days   Lab Units  01/02/19   2328  12/31/18   0524   MAGNESIUM mg/dL  1.9  1.8             Results from last 7 days   Lab Units  " 01/04/19   0455  01/03/19   0617  01/02/19   0516  01/01/19   0657  12/31/18   0524   WBC 10*3/mm3  8.85  10.71*  6.62  7.36  7.63   HEMOGLOBIN g/dL  14.1  13.3  13.2  13.1  13.1   PLATELETS 10*3/mm3  285  302  276  283  290               Imaging Results (last 24 hours)     ** No results found for the last 24 hours. **          aspirin 325 mg Oral Daily   Or      aspirin 300 mg Rectal Daily   atorvastatin 20 mg Oral Nightly   furosemide 20 mg Oral BID   nicotine 1 patch Transdermal Q24H   potassium chloride 40 mEq Oral BID   potassium chloride 40 mEq Oral Once   potassium chloride 10 mEq Intravenous Once   sodium chloride 3 mL Intravenous Q12H          Medication Review:   Current Facility-Administered Medications   Medication Dose Route Frequency Provider Last Rate Last Dose   • aspirin tablet 325 mg  325 mg Oral Daily Jo Harp MD   325 mg at 01/04/19 1037    Or   • aspirin suppository 300 mg  300 mg Rectal Daily Jo Harp MD   300 mg at 12/24/18 1657   • atorvastatin (LIPITOR) tablet 20 mg  20 mg Oral Nightly Jo Harp MD   20 mg at 01/03/19 2255   • furosemide (LASIX) tablet 20 mg  20 mg Oral BID Kevin Faust MD   20 mg at 01/04/19 1038   • hydrocortisone 1 % cream 1 application  1 application Topical TID PRN Marsha Hameed MD       • hydrOXYzine (VISTARIL) capsule 25 mg  25 mg Oral TID PRN Neftali Dowd MD   25 mg at 12/27/18 1833   • Magnesium Sulfate 2 gram Bolus, followed by 8 gram infusion (total Mg dose 10 grams)- Mg less than or equal to 1mg/dL  2 g Intravenous PRN Kevin Faust MD        Or   • Magnesium Sulfate 2 gram / 50mL Infusion (GIVE X 3 BAGS TO EQUAL 6GM TOTAL DOSE) - Mg 1.1 - 1.5 mg/dl  2 g Intravenous PRN Kevin Faust MD 25 mL/hr at 12/28/18 0531 2 g at 12/28/18 0531    Or   • Magnesium Sulfate 4 gram infusion- Mg 1.6-1.9 mg/dL  4 g Intravenous PRN Kevin Faust MD       • nicotine (NICODERM CQ) 21 MG/24HR patch 1 patch  1 patch Transdermal  Q24H Neftali Dowd MD   Stopped at 12/27/18 2134   • nitroglycerin (NITROSTAT) SL tablet 0.4 mg  0.4 mg Sublingual Q5 Min PRN Neftali Dowd MD       • potassium chloride (KLOR-CON) packet 40 mEq  40 mEq Oral PRN Neftali Dowd MD   40 mEq at 12/29/18 1707   • potassium chloride (MICRO-K) CR capsule 40 mEq  40 mEq Oral PRN Neftali Dowd MD        Or   • potassium chloride (KLOR-CON) packet 40 mEq  40 mEq Oral PRN Neftali Dowd MD        Or   • potassium chloride 10 mEq in 100 mL IVPB  10 mEq Intravenous Q1H PRN Neftali Dowd MD       • potassium chloride (KLOR-CON) packet 40 mEq  40 mEq Oral BID Mariely Harman MD   40 mEq at 01/04/19 1037   • potassium chloride (MICRO-K) CR capsule 40 mEq  40 mEq Oral PRN Neftali Dowd MD   40 mEq at 01/02/19 1748   • potassium chloride (MICRO-K) CR capsule 40 mEq  40 mEq Oral Once Kevin Faust MD       • potassium chloride 10 mEq in 100 mL IVPB  10 mEq Intravenous Once Yuridia De La Cruz PA       • potassium chloride 10 mEq in 100 mL IVPB  10 mEq Intravenous Q1H PRN Gary Jaime  mL/hr at 12/26/18 0159 10 mEq at 12/26/18 0159   • sodium chloride 0.9 % flush 10 mL  10 mL Intravenous PRN Yuridia De La Cruz PA       • sodium chloride 0.9 % flush 3 mL  3 mL Intravenous Q12H Jo Harp MD   3 mL at 01/04/19 1038   • sodium chloride 0.9 % flush 3-10 mL  3-10 mL Intravenous PRN Jo Harp MD       • zolpidem (AMBIEN) tablet 5 mg  5 mg Oral Nightly PRN Neftali Dowd MD   5 mg at 01/02/19 2049       Assessment/Plan   1. 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  TSH & cortisol WNL.  GFR normal.  Euvolemic, on lasix. Na stable today at 133.  On fluid restriction 1200cc/day  2. Metastatic small cell lung cancer on opdivo sp one dose 12/10.  Due again Jan 14.   3. Urinary retention: CIC recommended.   4. Diastolic CHF - compensated, no edema/dyspnea.  PO intake so poor I doubt she needs BID lasix  now    Plan  - relax fluid restriction slightly 1500cc/day  - dec lasix 20mg once daily   - Na stable, will sign off, d/w Dr Dowd, plan DC to rehab soon             Altered mental status    Metastatic cancer (CMS/HCC)    Small cell lung cancer (CMS/HCC)    Hyponatremia    Urine retention              Tao Gunn MD  01/04/19  2:24 PM

## 2019-01-04 NOTE — PROGRESS NOTES
Continued Stay Note  [facility]     Patient Name: Sujata Waters  MRN: [id number]  Today's Date: 1/4/2019    Admit Date: 12/24/2018    Discharge Plan     Row Name 01/04/19 1420       Plan    Plan  Referral to [facility] and [facility] private pay    Patient/Family in Agreement with Plan  yes    Plan Comments  Recieved inbound call from pts sister Vernell, explaination/clarification of medicaid vs medicaid pending vs skilled vs private pay, she states she believes pt will not qualify for medicaid due to over income. She requests first choices to [facility] and [facility] for private pay options. Spoke with Angela Bingham/Kevin for [facility] and cost per month is $9-10,000 a month and 2 year waiting list for medicaid pending. Spoke with Marilee/[facility]oss[facility] and she states facility discussed with pts sister regarding touring and bed availablity for private pay. Marilee/Jossy would confirm bed availablity for weekend. CCP called pts sister Vernell requesting call back, then MD is ready for dc once placement arranged. Ghazala/Jessica has spoken with pt, requested if she is able to reach out to pts sister Vernell, as Vernell requested her to speak with. Awaiting Referrals from other SNFs and recieved denials from [facility], [facility], [facility], [facility], [facility] and [facility]. Packet in CCP office. Troy MAYEN/CCP        Discharge Codes    No documentation.             Allie Dueñas RN

## 2019-01-05 LAB
ANION GAP SERPL CALCULATED.3IONS-SCNC: 11.7 MMOL/L
BASOPHILS # BLD AUTO: 0.01 10*3/MM3 (ref 0–0.2)
BASOPHILS NFR BLD AUTO: 0.1 % (ref 0–1.5)
BUN BLD-MCNC: 27 MG/DL (ref 8–23)
BUN/CREAT SERPL: 79.4 (ref 7–25)
CALCIUM SPEC-SCNC: 9.6 MG/DL (ref 8.6–10.5)
CHLORIDE SERPL-SCNC: 95 MMOL/L (ref 98–107)
CO2 SERPL-SCNC: 25.3 MMOL/L (ref 22–29)
CREAT BLD-MCNC: 0.34 MG/DL (ref 0.57–1)
DEPRECATED RDW RBC AUTO: 41.1 FL (ref 37–54)
EOSINOPHIL # BLD AUTO: 0 10*3/MM3 (ref 0–0.7)
EOSINOPHIL NFR BLD AUTO: 0 % (ref 0.3–6.2)
ERYTHROCYTE [DISTWIDTH] IN BLOOD BY AUTOMATED COUNT: 11.9 % (ref 11.7–13)
GFR SERPL CREATININE-BSD FRML MDRD: >150 ML/MIN/1.73
GLUCOSE BLD-MCNC: 115 MG/DL (ref 65–99)
HCT VFR BLD AUTO: 37.5 % (ref 35.6–45.5)
HGB BLD-MCNC: 13.6 G/DL (ref 11.9–15.5)
IMM GRANULOCYTES # BLD AUTO: 0.02 10*3/MM3 (ref 0–0.03)
IMM GRANULOCYTES NFR BLD AUTO: 0.2 % (ref 0–0.5)
LYMPHOCYTES # BLD AUTO: 1.26 10*3/MM3 (ref 0.9–4.8)
LYMPHOCYTES NFR BLD AUTO: 13.8 % (ref 19.6–45.3)
MCH RBC QN AUTO: 34.6 PG (ref 26.9–32)
MCHC RBC AUTO-ENTMCNC: 36.3 G/DL (ref 32.4–36.3)
MCV RBC AUTO: 95.4 FL (ref 80.5–98.2)
MONOCYTES # BLD AUTO: 0.23 10*3/MM3 (ref 0.2–1.2)
MONOCYTES NFR BLD AUTO: 2.5 % (ref 5–12)
NEUTROPHILS # BLD AUTO: 7.62 10*3/MM3 (ref 1.9–8.1)
NEUTROPHILS NFR BLD AUTO: 83.6 % (ref 42.7–76)
PLATELET # BLD AUTO: 281 10*3/MM3 (ref 140–500)
PMV BLD AUTO: 9.5 FL (ref 6–12)
POTASSIUM BLD-SCNC: 3.7 MMOL/L (ref 3.5–5.2)
RBC # BLD AUTO: 3.93 10*6/MM3 (ref 3.9–5.2)
SODIUM BLD-SCNC: 132 MMOL/L (ref 136–145)
WBC NRBC COR # BLD: 9.12 10*3/MM3 (ref 4.5–10.7)

## 2019-01-05 PROCEDURE — 80048 BASIC METABOLIC PNL TOTAL CA: CPT | Performed by: HOSPITALIST

## 2019-01-05 PROCEDURE — 97110 THERAPEUTIC EXERCISES: CPT

## 2019-01-05 PROCEDURE — 85025 COMPLETE CBC W/AUTO DIFF WBC: CPT | Performed by: HOSPITALIST

## 2019-01-05 RX ORDER — ACETAMINOPHEN 325 MG/1
325 TABLET ORAL EVERY 4 HOURS PRN
Status: DISCONTINUED | OUTPATIENT
Start: 2019-01-05 | End: 2019-01-06 | Stop reason: HOSPADM

## 2019-01-05 RX ADMIN — SODIUM CHLORIDE, PRESERVATIVE FREE 3 ML: 5 INJECTION INTRAVENOUS at 12:04

## 2019-01-05 RX ADMIN — SODIUM CHLORIDE, PRESERVATIVE FREE 3 ML: 5 INJECTION INTRAVENOUS at 21:00

## 2019-01-05 RX ADMIN — FUROSEMIDE 20 MG: 20 TABLET ORAL at 12:03

## 2019-01-05 RX ADMIN — ASPIRIN 325 MG: 325 TABLET ORAL at 12:03

## 2019-01-05 RX ADMIN — ATORVASTATIN CALCIUM 20 MG: 20 TABLET, FILM COATED ORAL at 21:00

## 2019-01-05 RX ADMIN — POTASSIUM CHLORIDE 40 MEQ: 1.5 POWDER, FOR SOLUTION ORAL at 12:03

## 2019-01-05 RX ADMIN — POTASSIUM CHLORIDE 40 MEQ: 1.5 POWDER, FOR SOLUTION ORAL at 21:00

## 2019-01-05 NOTE — THERAPY TREATMENT NOTE
Acute Care - Physical Therapy Treatment Note  [facility]     Patient Name: Sujata Waters  : 1955  MRN: [id number]  Today's Date: 2019  Onset of Illness/Injury or Date of Surgery: 18  Date of Referral to PT: 18       Admit Date: 2018    Visit Dx:    ICD-10-CM ICD-9-CM   1. Altered mental status, unspecified altered mental status type R41.82 780.97   2. Primary malignant neoplasm of right lung metastatic to other site (CMS/HCC) C34.91 162.9   3. Hyponatremia E87.1 276.1   4. General weakness R53.1 780.79     Patient Active Problem List   Diagnosis   • AR (allergic rhinitis)   • Postherpetic neuralgia   • Prolapse of mitral valve   • Rosacea   • Smoking addiction   • Left adrenal mass (CMS/HCC)   • Right renal mass   • Uterine mass   • Basal cell carcinoma   • Shingles   • Left cervical lymphadenopathy   • Weight loss   • RUQ pain   • Renal cyst   • Lung mass   • Metastatic cancer (CMS/HCC)   • Small cell lung cancer (CMS/HCC)   • Adenocarcinoma (CMS/HCC)   • Fitting and adjustment of vascular catheter   • Altered mental status   • Hyponatremia   • Urine retention       Therapy Treatment    Rehabilitation Treatment Summary     Row Name 19 1005             Treatment Time/Intention    Discipline  physical therapist  -EE      Document Type  therapy note (daily note)  -EE      Subjective Information  complains of;weakness;fatigue  -EE      Mode of Treatment  physical therapy  -EE      Patient/Family Observations  Pt supine in bed in no acute distress  -EE      Patient Effort  fair  -EE      Comment  Pt agreeable to attempting to sit EOB and perform exercises  -EE      Existing Precautions/Restrictions  fall  -EE      Recorded by [EE] Sowmya Edge, PT 19 1020      Row Name 19 1005             Cognitive Assessment/Intervention- PT/OT    Orientation Status (Cognition)  oriented x 3  -EE      Follows Commands (Cognition)  WNL  -EE      Personal Safety Interventions  fall  prevention program maintained;gait belt;nonskid shoes/slippers when out of bed;muscle strengthening facilitated;supervised activity  -EE      Recorded by [EE] Sowmya Edge, PT 01/05/19 1020      Row Name 01/05/19 1005             Bed Mobility Assessment/Treatment    Supine-Sit [place] (Bed Mobility)  moderate assist (50% patient effort);verbal cues;nonverbal cues (demo/gesture)  -EE      Sit-Supine [place] (Bed Mobility)  maximum assist (25% patient effort);verbal cues;nonverbal cues (demo/gesture)  -EE      Assistive Device (Bed Mobility)  bed rails;head of bed elevated  -EE      Comment (Bed Mobility)  Sat EOB for ~1 min then became shaky with writhing movements of UE noted. Returned to supine with max A from therapist and shaking stopped quickly. Pt conscious throughout, denied pain, vital signs stable throughout. Stephenie MAYEN, arrived immediately following episode.  -EE      Recorded by [EE] Sowmya Edge, PT 01/05/19 1020      Row Name 01/05/19 1005             Positioning and Restraints    Pre-Treatment Position  in bed  -EE      Post Treatment Position  bed  -EE      In Bed  supine;call light within reach;exit alarm on;encouraged to call for assist;notified nsg  -EE      Recorded by [EE] Sowmya Edge, PT 01/05/19 1020      Row Name 01/05/19 1005             Pain Scale: Numbers Pre/Post-Treatment    Pain Scale: Numbers, Pretreatment  0/10 - no pain  -EE      Pain Scale: Numbers, Post-Treatment  0/10 - no pain  -EE      Recorded by [EE] Sowmya Edge, PT 01/05/19 1020      Row Name                Wound 12/31/18 Right anterior wrist skin tear    Wound - Properties Group Date first assessed: 12/31/18 [AR] Present On Admission : no [AR] Side: Right [AR] Orientation: anterior [AR] Location: wrist [AR] Type: skin tear [AR] Recorded by:  [AR] Meena Cueto RN 12/31/18 6731      User Key  (r) = Recorded By, (t) = Taken By, (c) = Cosigned By    Initials Name Effective Dates Discipline    EE Sowmya Edge, PT 04/03/18  -  PT    Meena Jones RN 09/13/17 -  Nurse          Wound 12/31/18 Right anterior wrist skin tear (Active)   Dressing Appearance dry;intact;no drainage 1/4/2019  8:36 PM   Closure DAPHNE 1/4/2019  8:36 PM   Base dressing in place, unable to visualize 1/4/2019  8:36 PM   Drainage Amount none 1/4/2019  8:36 PM   Care, Wound other (see comments) 1/4/2019  2:55 PM   Dressing Care, Wound other (see comments) 1/4/2019  8:36 PM           Physical Therapy Education     Title: PT OT SLP Therapies (In Progress)     Topic: Physical Therapy (In Progress)     Point: Mobility training (In Progress)     Learning Progress Summary           Patient Acceptance, E, NR by EE at 1/5/2019 10:20 AM    Acceptance, E,D, NR by MS at 1/4/2019  3:23 PM    Acceptance, E, NR by AR at 1/3/2019  1:44 PM    Acceptance, E, NR,NL by EF at 1/2/2019  5:36 PM    Acceptance, E,D, NR by PC at 12/31/2018 11:30 AM    Acceptance, E,TB, VU,DU by CW at 12/30/2018  9:41 AM    Acceptance, E,TB, VU by CS at 12/29/2018 10:57 AM    Acceptance, E,TB, VU,DU by CW at 12/28/2018  9:01 AM    Acceptance, E,TB, VU by AK at 12/28/2018  2:37 AM    Acceptance, TB,E, NR,DU by CW at 12/27/2018  9:43 AM    Acceptance, E, NR by EF at 12/26/2018  2:01 PM    Comment:  Dgt voiced understanding, pt needs reinforcement   Family Acceptance, E,TB, VU,DU by CW at 12/28/2018  9:01 AM    Acceptance, E, NR by EF at 12/26/2018  2:01 PM    Comment:  Dgt voiced understanding, pt needs reinforcement                   Point: Home exercise program (In Progress)     Learning Progress Summary           Patient Acceptance, E,D, NR by MS at 1/4/2019  3:23 PM    Acceptance, E, NR by AR at 1/3/2019  1:44 PM    Acceptance, E, NR,NL by EF at 1/2/2019  5:36 PM    Acceptance, E,TB, VU,DU by CW at 12/30/2018  9:41 AM    Acceptance, E,TB, VU by CS at 12/29/2018 10:57 AM    Acceptance, E,TB, VU,DU by LATISHA at 12/28/2018  9:01 AM    Acceptance, E,TB, VU by AK at 12/28/2018  2:37 AM    Acceptance, TB,E, NRKARRI by  CW at 12/27/2018  9:43 AM    Acceptance, E, NR by EF at 12/26/2018  2:01 PM    Comment:  Dgt voiced understanding, pt needs reinforcement   Family Acceptance, E,TB, VU,DU by CW at 12/28/2018  9:01 AM    Acceptance, E, NR by EF at 12/26/2018  2:01 PM    Comment:  Dgt voiced understanding, pt needs reinforcement                   Point: Body mechanics (In Progress)     Learning Progress Summary           Patient Acceptance, E,D, NR by MS at 1/4/2019  3:23 PM    Acceptance, E, NR by AR at 1/3/2019  1:44 PM    Acceptance, E, NR,NL by EF at 1/2/2019  5:36 PM    Acceptance, E,D, NR by PC at 12/31/2018 11:30 AM    Acceptance, E,TB, VU,DU by CW at 12/30/2018  9:41 AM    Acceptance, E,TB, VU by CS at 12/29/2018 10:57 AM    Acceptance, E,TB, VU,DU by CW at 12/28/2018  9:01 AM    Acceptance, E,TB, VU by AK at 12/28/2018  2:37 AM    Acceptance, TB,E, NR,DU by CW at 12/27/2018  9:43 AM    Acceptance, E, NR by EF at 12/26/2018  2:01 PM    Comment:  Dgt voiced understanding, pt needs reinforcement   Family Acceptance, E,TB, VU,DU by CW at 12/28/2018  9:01 AM    Acceptance, E, NR by EF at 12/26/2018  2:01 PM    Comment:  Dgt voiced understanding, pt needs reinforcement                   Point: Precautions (In Progress)     Learning Progress Summary           Patient Acceptance, E,D, NR by MS at 1/4/2019  3:23 PM    Acceptance, E, NR by AR at 1/3/2019  1:44 PM    Acceptance, E, NR,NL by EF at 1/2/2019  5:36 PM    Acceptance, E,D, NR by PC at 12/31/2018 11:30 AM    Acceptance, E,TB, VU,DU by CW at 12/30/2018  9:41 AM    Acceptance, E,TB, VU by CS at 12/29/2018 10:57 AM    Acceptance, E,TB, VU,DU by CW at 12/28/2018  9:01 AM    Acceptance, E,TB, VU by AK at 12/28/2018  2:37 AM    Acceptance, TB,E, NR,DU by CW at 12/27/2018  9:43 AM    Acceptance, E, NR by EF at 12/26/2018  2:01 PM    Comment:  Dgt voiced understanding, pt needs reinforcement   Family Acceptance, E,TB, VU,DU by CW at 12/28/2018  9:01 AM    Acceptance, E, NR by EF at  12/26/2018  2:01 PM    Comment:  Dgt voiced understanding, pt needs reinforcement                               User Key     Initials Effective Dates Name Provider Type Discipline    EF 06/08/18 -  Lela Espino, PT Physical Therapist PT    PC 04/03/18 -  Kemi Jones, PT Physical Therapist PT    EE 04/03/18 -  Sowmya Edge, PT Physical Therapist PT    MS 04/03/18 -  Xavi Zaldivar, PT Physical Therapist PT    AR 04/03/18 -  Kerri Vang, PT Physical Therapist PT    CW 03/07/18 -  Freedom Jacobs, PTA Physical Therapy Assistant PT    AK 03/17/17 -  Stephenie Yuen, RN Registered Nurse Nurse    CS 05/14/18 -  Gauatm Garcia, PT Physical Therapist PT                PT Recommendation and Plan     Plan of Care Reviewed With: patient  Outcome Summary: Pt agreeable to PT; able to sit EOB with mod A; however, became very shaky while sitting and required return to supine for safety. Denies pain throughout session; will continue to mobilize as appropriate.   Outcome Measures     Row Name 01/05/19 1000 01/04/19 1500 01/04/19 1002       How much help from another person do you currently need...    Turning from your back to your side while in flat bed without using bedrails?  2  -EE  3  -MS  --    Moving from lying on back to sitting on the side of a flat bed without bedrails?  2  -EE  2  -MS  --    Moving to and from a bed to a chair (including a wheelchair)?  1  -EE  2  -MS  --    Standing up from a chair using your arms (e.g., wheelchair, bedside chair)?  1  -EE  1  -MS  --    Climbing 3-5 steps with a railing?  1  -EE  1  -MS  --    To walk in hospital room?  1  -EE  1  -MS  --    AM-PAC 6 Clicks Score  8  -EE  10  -MS  --       How much help from another is currently needed...    Putting on and taking off regular lower body clothing?  --  --  1  -LE    Bathing (including washing, rinsing, and drying)  --  --  1  -LE    Toileting (which includes using toilet bed pan or urinal)  --  --  1  -LE    Putting on  and taking off regular upper body clothing  --  --  2  -LE    Taking care of personal grooming (such as brushing teeth)  --  --  2  -LE    Eating meals  --  --  2  -LE    Score  --  --  9  -LE       Functional Assessment    Outcome Measure Options  AM-PAC 6 Clicks Basic Mobility (PT)  -EE  AM-PAC 6 Clicks Basic Mobility (PT)  -MS  --    Row Name 01/04/19 1000 01/03/19 1436 01/03/19 1400       How much help from another is currently needed...    Putting on and taking off regular lower body clothing?  --  1  -LE  --    Bathing (including washing, rinsing, and drying)  --  2  -LE  --    Toileting (which includes using toilet bed pan or urinal)  --  1  -LE  --    Putting on and taking off regular upper body clothing  --  2  -LE  --    Taking care of personal grooming (such as brushing teeth)  --  2  -LE  --    Eating meals  --  3  -LE  --    Score  --  11  -LE  --       Functional Assessment    Outcome Measure Options  AM-PAC 6 Clicks Daily Activity (OT)  -LE  --  AM-PAC 6 Clicks Daily Activity (OT)  -LE    Row Name 01/03/19 1300 01/02/19 1700          How much help from another person do you currently need...    Turning from your back to your side while in flat bed without using bedrails?  3  -AR  3  -EF     Moving from lying on back to sitting on the side of a flat bed without bedrails?  2  -AR  3  -EF     Moving to and from a bed to a chair (including a wheelchair)?  2  -AR  3  -EF     Standing up from a chair using your arms (e.g., wheelchair, bedside chair)?  1  -AR  3  -EF     Climbing 3-5 steps with a railing?  1  -AR  1  -EF     To walk in hospital room?  1  -AR  2  -EF     AM-PAC 6 Clicks Score  10  -AR  15  -EF        Functional Assessment    Outcome Measure Options  --  AM-PAC 6 Clicks Basic Mobility (PT)  -EF       User Key  (r) = Recorded By, (t) = Taken By, (c) = Cosigned By    Initials Name Provider Type    Eve Ventura, OTR Occupational Therapist    Lela Suárez, PT Physical Therapist    EE  Sowmya Edge, PT Physical Therapist    Xavi Villanueva, PT Physical Therapist    Kerri Baxter, PT Physical Therapist         Time Calculation:   PT Charges     Row Name 01/05/19 1021             Time Calculation    Start Time  1005  -EE      Stop Time  1015  -EE      Time Calculation (min)  10 min  -EE      PT Received On  01/05/19  -EE      PT - Next Appointment  01/07/19  -EE         Time Calculation- PT    Total Timed Code Minutes- PT  10 minute(s)  -EE        User Key  (r) = Recorded By, (t) = Taken By, (c) = Cosigned By    Initials Name Provider Type    EE Sowmya Edge, PT Physical Therapist        Therapy Suggested Charges     Code   Minutes Charges    None           Therapy Charges for Today     Code Description Service Date Service Provider Modifiers Qty    [id number] HC PT THER PROC EA 15 MIN 1/5/2019 Sowmya Edge, PT GP 1          PT G-Codes  Outcome Measure Options: AM-PAC 6 Clicks Basic Mobility (PT)  AM-PAC 6 Clicks Score: 8  Score: 9    Sowmya Edge PT  1/5/2019

## 2019-01-05 NOTE — PLAN OF CARE
Problem: Patient Care Overview  Goal: Plan of Care Review   01/05/19 1020   Coping/Psychosocial   Plan of Care Reviewed With patient   OTHER   Outcome Summary Pt agreeable to PT; able to sit EOB with mod A; however, became very shaky while sitting and required return to supine for safety. Denies pain throughout session; will continue to mobilize as appropriate.

## 2019-01-05 NOTE — PLAN OF CARE
Problem: Patient Care Overview  Goal: Plan of Care Review  Outcome: Ongoing (interventions implemented as appropriate)   01/05/19 4292   Coping/Psychosocial   Plan of Care Reviewed With patient   Plan of Care Review   Progress no change   OTHER   Outcome Summary Pt alert, some forgetfulness overnight. Denies pain. Plan for discharge Sunday to Rehab facility. Will continue to monitor.        Problem: Skin Injury Risk (Adult)  Goal: Skin Health and Integrity  Outcome: Ongoing (interventions implemented as appropriate)      Problem: Fall Risk (Adult)  Goal: Absence of Fall  Outcome: Ongoing (interventions implemented as appropriate)

## 2019-01-05 NOTE — PLAN OF CARE
Problem: Patient Care Overview  Goal: Plan of Care Review  Outcome: Ongoing (interventions implemented as appropriate)   01/05/19 2203   Coping/Psychosocial   Plan of Care Reviewed With patient   Plan of Care Review   Progress no change   OTHER   Outcome Summary VSS, no falls, no c/o pain, eating very little, F/C to BSD, daughter to bedside, alert and oriented x4, will CTM

## 2019-01-05 NOTE — PROGRESS NOTES
"DAILY PROGRESS NOTE  [facility]    Patient Identification:  Name: Sujata Waters  Age: 63 y.o.  Sex: female  :  1955  MRN: [id number]         Primary Care Physician: Bernie Francois MD    Subjective:  Interval History:She feels better today.  No new complaints. Still very weak    Objective:    Scheduled Meds:    aspirin 325 mg Oral Daily   Or      aspirin 300 mg Rectal Daily   atorvastatin 20 mg Oral Nightly   furosemide 20 mg Oral Daily   nicotine 1 patch Transdermal Q24H   potassium chloride 40 mEq Oral BID   potassium chloride 40 mEq Oral Once   potassium chloride 10 mEq Intravenous Once   sodium chloride 3 mL Intravenous Q12H     Continuous Infusions:     Vital signs in last 24 hours:  Temp:  [98.1 °F (36.7 °C)-98.9 °F (37.2 °C)] 98.1 °F (36.7 °C)  Heart Rate:  [68-78] 68  Resp:  [16-18] 16  BP: (101-123)/(65-81) 106/67    Intake/Output:    Intake/Output Summary (Last 24 hours) at 2019 1433  Last data filed at 2019 2100  Gross per 24 hour   Intake 150 ml   Output 525 ml   Net -375 ml       Exam:  /67 (BP Location: Right arm, Patient Position: Lying)   Pulse 68   Temp 98.1 °F (36.7 °C) (Oral)   Resp 16   Ht 162.6 cm (64\")   Wt 32.2 kg (71 lb)   SpO2 98%   BMI 12.19 kg/m²     General Appearance:    Alert, cooperative, no distress   Head:    Normocephalic, without obvious abnormality, atraumatic   Eyes:       Throat:   Lips, tongue, gums normal   Neck:   Supple, symmetrical, trachea midline, no JVD   Lungs:     Clear to auscultation bilaterally, respirations unlabored   Chest Wall:    No tenderness or deformity    Heart:    Regular rate and rhythm, S1 and S2 normal, no murmur,no  Rub or gallop   Abdomen:     Soft, non-tender, bowel sounds active, no masses, no organomegaly    Extremities:   Extremities normal, atraumatic, no cyanosis or edema   Pulses:      Skin:   Skin is warm and dry,  no rashes or palpable lesions   Neurologic:   no focal deficits " noted      Lab Results (last 72 hours)     Procedure Component Value Units Date/Time    Sodium [[id number]]  (Abnormal) Collected:  12/25/18 1423    Specimen:  Blood Updated:  12/25/18 1445     Sodium 130 mmol/L     Potassium [[id number]]  (Abnormal) Collected:  12/25/18 1423    Specimen:  Blood Updated:  12/25/18 1445     Potassium 3.1 mmol/L     Lipid Panel [[id number]] Collected:  12/25/18 0639    Specimen:  Blood Updated:  12/25/18 0851     Total Cholesterol 141 mg/dL      Triglycerides 61 mg/dL      HDL Cholesterol 57 mg/dL      LDL Cholesterol  72 mg/dL      VLDL Cholesterol 12.2 mg/dL      LDL/HDL Ratio 1.26    Narrative:       Cholesterol Reference Ranges  (U.S. Department of Health and Human Services ATP III Classifications)    Desirable          <200 mg/dL  Borderline High    200-239 mg/dL  High Risk          >240 mg/dL      Triglyceride Reference Ranges  (U.S. Department of Health and Human Services ATP III Classifications)    Normal           <150 mg/dL  Borderline High  150-199 mg/dL  High             200-499 mg/dL  Very High        >500 mg/dL    HDL Reference Ranges  (U.S. Department of Health and Human Services ATP III Classifcations)    Low     <40 mg/dl (major risk factor for CHD)  High    >60 mg/dl ('negative' risk factor for CHD)        LDL Reference Ranges  (U.S. Department of Health and Human Services ATP III Classifcations)    Optimal          <100 mg/dL  Near Optimal     100-129 mg/dL  Borderline High  130-159 mg/dL  High             160-189 mg/dL  Very High        >189 mg/dL    Sodium [[id number]]  (Abnormal) Collected:  12/25/18 0639    Specimen:  Blood Updated:  12/25/18 0851     Sodium 129 mmol/L     Hemoglobin A1c [[id number]]  (Normal) Collected:  12/25/18 0639    Specimen:  Blood Updated:  12/25/18 0842     Hemoglobin A1C 4.90 %     Narrative:       Hemoglobin A1C Ranges:    Increased Risk for Diabetes  5.7% to 6.4%  Diabetes                     >= 6.5%  Diabetic Goal                < 7.0%     Urinalysis With Culture If Indicated - Urine, Catheter In/Out [[id number]]  (Abnormal) Collected:  12/25/18 0652    Specimen:  Urine, Catheter In/Out Updated:  12/25/18 0837     Color, UA Yellow     Appearance, UA Clear     pH, UA 6.0     Specific Gravity, UA >=1.030     Glucose, UA Negative     Ketones, UA 80 mg/dL (3+)     Bilirubin, UA Negative     Blood, UA Negative     Protein, UA Negative     Leuk Esterase, UA Negative     Nitrite, UA Negative     Urobilinogen, UA 1.0 E.U./dL    Narrative:       Urine microscopic not indicated.    Osmolality, Urine - Urine, Catheter In/Out [[id number]] Collected:  12/25/18 0652    Specimen:  Urine, Catheter In/Out Updated:  12/25/18 0834     Osmolality, Urine 485 mOsm/kg     Narrative:       Osmo Normal Reference Ranges:    Random:  mOsm/kg H2O, depending on fluid intake.  Random: >850 mOsm/kg H20, after 12 hour fluid restriction.    24 Hour: 300-900 mOsm/kg H2O.    POC Glucose Once [[id number]]  (Normal) Collected:  12/25/18 0624    Specimen:  Blood Updated:  12/25/18 0626     Glucose 80 mg/dL     Sodium [[id number]]  (Abnormal) Collected:  12/24/18 2349    Specimen:  Blood Updated:  12/25/18 0040     Sodium 128 mmol/L     Cortisol [[id number]]  (Normal) Collected:  12/24/18 2130    Specimen:  Blood Updated:  12/24/18 2243     Cortisol 28.50 mcg/dL     Narrative:       Cortisol Reference Ranges:    Cortisol 6AM - 10AM Range: 6.02-18.40 mcg/dl  Cortisol 4PM - 8PM Range: 2.68-10.50 mcg/dl  Critical AM/PM:    Less than 2 mcg/dl    Sodium [[id number]]  (Abnormal) Collected:  12/24/18 2130    Specimen:  Blood Updated:  12/24/18 2201     Sodium 127 mmol/L     Sodium, Urine, Random - Urine, Catheter [[id number]] Collected:  12/24/18 1717    Specimen:  Urine, Catheter Updated:  12/24/18 1922     Sodium, Urine 24 mmol/L     Narrative:       Reference intervals for random urine have not been established.  Clinical usage is dependent upon physician's interpretation in combination  with other laboratory tests.     Osmolality, Serum [[id number]]  (Abnormal) Collected:  12/24/18 1624    Specimen:  Blood Updated:  12/24/18 1901     Osmolality 263 mOsm/kg     CBC & Differential [[id number]] Collected:  12/24/18 1624    Specimen:  Blood Updated:  12/24/18 1750    Narrative:       The following orders were created for panel order CBC & Differential.  Procedure                               Abnormality         Status                     ---------                               -----------         ------                     Scan Slide[[id number]]                   Normal              Final result               CBC Auto Differential[[id number]]        Abnormal            Final result                 Please view results for these tests on the individual orders.    Scan Slide [[id number]]  (Normal) Collected:  12/24/18 1624    Specimen:  Blood Updated:  12/24/18 1750     RBC Morphology Normal     WBC Morphology Normal     Platelet Morphology Normal    CBC Auto Differential [[id number]]  (Abnormal) Collected:  12/24/18 1624    Specimen:  Blood Updated:  12/24/18 1750     WBC 9.73 10*3/mm3      RBC 4.16 10*6/mm3      Hemoglobin 14.0 g/dL      Comment: ESTIMATED HGB        Hematocrit 42.0 %      Comment: SPUN HCT        .9 fL      MCH 33.7 pg      MCHC 33.3 g/dL      RDW 11.9 %      RDW-SD 40.0 fl      MPV 9.3 fL      Platelets 283 10*3/mm3      Neutrophil % 83.2 %      Lymphocyte % 16.0 %      Monocyte % 0.6 %      Eosinophil % 0.0 %      Basophil % 0.0 %      Immature Grans % 0.2 %      Neutrophils, Absolute 8.09 10*3/mm3      Lymphocytes, Absolute 1.56 10*3/mm3      Monocytes, Absolute 0.06 10*3/mm3      Eosinophils, Absolute 0.00 10*3/mm3      Basophils, Absolute 0.00 10*3/mm3      Immature Grans, Absolute 0.02 10*3/mm3     TSH [[id number]]  (Normal) Collected:  12/24/18 1624    Specimen:  Blood Updated:  12/24/18 1748     TSH 0.659 mIU/mL     [place] Draw [[id number]] Collected:  12/24/18 1624     Specimen:  Blood Updated:  12/24/18 1745    Narrative:       The following orders were created for panel order [place] Draw.  Procedure                               Abnormality         Status                     ---------                               -----------         ------                     Light Blue Top[[id number]]                                   Final result               Green Top (Gel)[[id number]]                                  Final result               Lavender Top[[id number]]                                     Final result               Gold Top - SST[[id number]]                                   Final result                 Please view results for these tests on the individual orders.    Lavender Top [[id number]] Collected:  12/24/18 1624    Specimen:  Blood Updated:  12/24/18 1745     Extra Tube hold for add-on     Comment: Auto resulted       Urinalysis With Microscopic If Indicated (No Culture) - Urine, Catheter [[id number]]  (Abnormal) Collected:  12/24/18 1717    Specimen:  Urine, Catheter Updated:  12/24/18 1736     Color, UA Yellow     Appearance, UA Clear     pH, UA 6.5     Specific Gravity, UA 1.015     Glucose, UA Negative     Ketones, UA 40 mg/dL (2+)     Bilirubin, UA Negative     Blood, UA Negative     Protein, UA Negative     Leuk Esterase, UA Negative     Nitrite, UA Negative     Urobilinogen, UA 0.2 E.U./dL    Narrative:       Urine microscopic not indicated.    Light Blue Top [[id number]] Collected:  12/24/18 1624    Specimen:  Blood Updated:  12/24/18 1730     Extra Tube hold for add-on     Comment: Auto resulted       Gold Top - SST [[id number]] Collected:  12/24/18 1624    Specimen:  Blood Updated:  12/24/18 1730     Extra Tube Hold for add-ons.     Comment: Auto resulted.       Green Top (Gel) [[id number]] Collected:  12/24/18 1624    Specimen:  Blood Updated:  12/24/18 1730     Extra Tube Hold for add-ons.     Comment: Auto resulted.       Vitamin B12 [[id number]]  (Abnormal)  Collected:  12/24/18 1624    Specimen:  Blood Updated:  12/24/18 1717     Vitamin B-12 1,655 pg/mL     Comprehensive Metabolic Panel [[id number]]  (Abnormal) Collected:  12/24/18 1624    Specimen:  Blood Updated:  12/24/18 1656     Glucose 90 mg/dL      BUN 21 mg/dL      Creatinine 0.62 mg/dL      Sodium 123 mmol/L      Potassium 3.1 mmol/L      Chloride 82 mmol/L      CO2 22.8 mmol/L      Calcium 9.8 mg/dL      Total Protein 7.2 g/dL      Albumin 4.30 g/dL      ALT (SGPT) 14 U/L      AST (SGOT) 25 U/L      Alkaline Phosphatase 61 U/L      Total Bilirubin 0.9 mg/dL      eGFR Non African Amer 97 mL/min/1.73      Globulin 2.9 gm/dL      A/G Ratio 1.5 g/dL      BUN/Creatinine Ratio 33.9     Anion Gap 18.2 mmol/L     Troponin [[id number]]  (Normal) Collected:  12/24/18 1624    Specimen:  Blood Updated:  12/24/18 1656     Troponin T <0.010 ng/mL     Narrative:       Troponin T Reference Ranges:  Less than 0.03 ng/mL:    Negative for AMI  0.03 to 0.09 ng/mL:      Indeterminant for AMI  Greater than 0.09 ng/mL: Positive for AMI    Protime-INR [[id number]]  (Normal) Collected:  12/24/18 1624    Specimen:  Blood Updated:  12/24/18 1647     Protime 13.5 Seconds      INR 1.05        Data Review:  Results from last 7 days   Lab Units  01/05/19 0327 01/04/19 0455 01/03/19   0617   SODIUM mmol/L  132*  133*  133*   POTASSIUM mmol/L  3.7  4.2  3.9   CHLORIDE mmol/L  95*  95*  93*   CO2 mmol/L  25.3  24.7  28.8   BUN mg/dL  27*  20  19   CREATININE mg/dL  0.34*  0.41*  0.39*   GLUCOSE mg/dL  115*  105*  112*   CALCIUM mg/dL  9.6  9.6  9.4     Results from last 7 days   Lab Units  01/05/19 0327 01/04/19 0455 01/03/19   0617   WBC 10*3/mm3  9.12  8.85  10.71*   HEMOGLOBIN g/dL  13.6  14.1  13.3   HEMATOCRIT %  37.5  39.1  36.7   PLATELETS 10*3/mm3  281  285  302             Lab Results   Lab Value Date/Time    TROPONINT <0.010 12/24/2018 0204               Invalid input(s): PROT, LABALBU          No results found for:  POCGLU        Past Medical History:   Diagnosis Date   • Anxiety    • Basal cell carcinoma     Follows up with Dermatology annually, PA with Dr. Antoine's office   • Bowen's disease of vulva    • Depression    • History of kidney stone 2018   • Left adrenal mass (CMS/HCC)    • Lung cancer (CMS/HCC)     Right lung with metastasis to neck node.   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       Plan:  nephrology consult noted. Neurology consult noted and oncology. Correct sodium.   urology consult and intermitant cath for urine retention. Fluid restriction. Follow up labs.    PT eval. Encourage to work with PT. Will need SNU for rehab.  She is too weak to go home.  DC planning.  In process. Palliative care consult. Norden ethel. No CPR for now.   To SNU Sunday.    Neftali Dowd MD  1/5/2019  2:33 PM

## 2019-01-06 VITALS
SYSTOLIC BLOOD PRESSURE: 117 MMHG | WEIGHT: 70.55 LBS | HEART RATE: 72 BPM | HEIGHT: 64 IN | DIASTOLIC BLOOD PRESSURE: 71 MMHG | OXYGEN SATURATION: 98 % | BODY MASS INDEX: 12.04 KG/M2 | RESPIRATION RATE: 18 BRPM | TEMPERATURE: 98.9 F

## 2019-01-06 LAB
ANION GAP SERPL CALCULATED.3IONS-SCNC: 12 MMOL/L
BASOPHILS # BLD AUTO: 0 10*3/MM3 (ref 0–0.2)
BASOPHILS NFR BLD AUTO: 0 % (ref 0–1.5)
BUN BLD-MCNC: 25 MG/DL (ref 8–23)
BUN/CREAT SERPL: 75.8 (ref 7–25)
CALCIUM SPEC-SCNC: 9.5 MG/DL (ref 8.6–10.5)
CHLORIDE SERPL-SCNC: 94 MMOL/L (ref 98–107)
CO2 SERPL-SCNC: 28 MMOL/L (ref 22–29)
CREAT BLD-MCNC: 0.33 MG/DL (ref 0.57–1)
DEPRECATED RDW RBC AUTO: 40.7 FL (ref 37–54)
EOSINOPHIL # BLD AUTO: 0.01 10*3/MM3 (ref 0–0.7)
EOSINOPHIL NFR BLD AUTO: 0.1 % (ref 0.3–6.2)
ERYTHROCYTE [DISTWIDTH] IN BLOOD BY AUTOMATED COUNT: 11.9 % (ref 11.7–13)
GFR SERPL CREATININE-BSD FRML MDRD: >150 ML/MIN/1.73
GLUCOSE BLD-MCNC: 104 MG/DL (ref 65–99)
HCT VFR BLD AUTO: 35.1 % (ref 35.6–45.5)
HGB BLD-MCNC: 12.6 G/DL (ref 11.9–15.5)
IMM GRANULOCYTES # BLD AUTO: 0.02 10*3/MM3 (ref 0–0.03)
IMM GRANULOCYTES NFR BLD AUTO: 0.2 % (ref 0–0.5)
LYMPHOCYTES # BLD AUTO: 1.22 10*3/MM3 (ref 0.9–4.8)
LYMPHOCYTES NFR BLD AUTO: 12.6 % (ref 19.6–45.3)
MCH RBC QN AUTO: 34.1 PG (ref 26.9–32)
MCHC RBC AUTO-ENTMCNC: 35.9 G/DL (ref 32.4–36.3)
MCV RBC AUTO: 94.9 FL (ref 80.5–98.2)
MONOCYTES # BLD AUTO: 0.21 10*3/MM3 (ref 0.2–1.2)
MONOCYTES NFR BLD AUTO: 2.2 % (ref 5–12)
NEUTROPHILS # BLD AUTO: 8.27 10*3/MM3 (ref 1.9–8.1)
NEUTROPHILS NFR BLD AUTO: 85.1 % (ref 42.7–76)
PLATELET # BLD AUTO: 280 10*3/MM3 (ref 140–500)
PMV BLD AUTO: 9.3 FL (ref 6–12)
POTASSIUM BLD-SCNC: 3.8 MMOL/L (ref 3.5–5.2)
RBC # BLD AUTO: 3.7 10*6/MM3 (ref 3.9–5.2)
SODIUM BLD-SCNC: 134 MMOL/L (ref 136–145)
WBC NRBC COR # BLD: 9.71 10*3/MM3 (ref 4.5–10.7)

## 2019-01-06 PROCEDURE — 85025 COMPLETE CBC W/AUTO DIFF WBC: CPT | Performed by: HOSPITALIST

## 2019-01-06 PROCEDURE — 80048 BASIC METABOLIC PNL TOTAL CA: CPT | Performed by: HOSPITALIST

## 2019-01-06 RX ORDER — NICOTINE 21 MG/24HR
1 PATCH, TRANSDERMAL 24 HOURS TRANSDERMAL
Start: 2019-01-07

## 2019-01-06 RX ORDER — FUROSEMIDE 20 MG/1
20 TABLET ORAL DAILY
Start: 2019-01-07

## 2019-01-06 RX ORDER — ZOLPIDEM TARTRATE 5 MG/1
5 TABLET ORAL NIGHTLY PRN
Qty: 5 TABLET | Refills: 0 | Status: SHIPPED | OUTPATIENT
Start: 2019-01-06 | End: 2019-01-15

## 2019-01-06 RX ADMIN — SODIUM CHLORIDE, PRESERVATIVE FREE 3 ML: 5 INJECTION INTRAVENOUS at 09:12

## 2019-01-06 RX ADMIN — POTASSIUM CHLORIDE 40 MEQ: 1.5 POWDER, FOR SOLUTION ORAL at 09:11

## 2019-01-06 RX ADMIN — FUROSEMIDE 20 MG: 20 TABLET ORAL at 09:11

## 2019-01-06 RX ADMIN — ASPIRIN 325 MG: 325 TABLET ORAL at 09:11

## 2019-01-06 NOTE — NURSING NOTE
Patient discharging to [facility] for rehab.  Report called to Maddie dickinson to remain in place for discharge.  Ambien script sent in packet.  Awaiting EMS to transport.

## 2019-01-06 NOTE — PLAN OF CARE
Problem: Patient Care Overview  Goal: Plan of Care Review  Outcome: Ongoing (interventions implemented as appropriate)   01/06/19 7995   Coping/Psychosocial   Plan of Care Reviewed With patient;family   Plan of Care Review   Progress no change   OTHER   Outcome Summary Has f/c patent to bsd/remains on 1500CC/day fluid restriction/poor appetite/no falls/falls protocol maintained/very weak/A & O x4 Mepilex intact to left ankle/accumax pump on bed/also up in recliner for short time this shift with waffle cushion/c/o pain/called MD for tylenol per patient request, but when offered, stated pain was gone and declined it/CCP asssiting with D/C needs/To have Palliative consult 1/6/2019. Otherwise, plan is for d/c to [facility]/will continue to monitor        Problem: Skin Injury Risk (Adult)  Goal: Skin Health and Integrity  Outcome: Ongoing (interventions implemented as appropriate)      Problem: Fall Risk (Adult)  Goal: Absence of Fall  Outcome: Ongoing (interventions implemented as appropriate)      Problem: Nutrition, Imbalanced: Inadequate Oral Intake (Adult)  Goal: Identify Related Risk Factors and Signs and Symptoms  Outcome: Outcome(s) achieved Date Met: 01/06/19    Goal: Improved Oral Intake  Outcome: Ongoing (interventions implemented as appropriate)      Problem: Pain, Acute (Adult)  Goal: Identify Related Risk Factors and Signs and Symptoms  Outcome: Ongoing (interventions implemented as appropriate)    Goal: Acceptable Pain Control/Comfort Level  Outcome: Ongoing (interventions implemented as appropriate)

## 2019-01-06 NOTE — DISCHARGE SUMMARY
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                 Please view results for these tests on the individual orders.    CBC Auto Differential [[id number]]  (Abnormal) Collected:  01/01/19 0657    Specimen:  Blood Updated:  01/01/19 0742     WBC 7.36 10*3/mm3      RBC 3.80 10*6/mm3      Hemoglobin 13.1 g/dL      Hematocrit 37.4 %      MCV 98.4 fL      MCH 34.5 pg      MCHC 35.0 g/dL      RDW 11.8 %      RDW-SD 42.2 fl      MPV 9.2 fL      Platelets 283 10*3/mm3      Neutrophil % 82.7 %      Lymphocyte % 14.0 %      Monocyte % 2.6 %      Eosinophil % 0.3 %      Basophil % 0.1 %      Immature Grans % 0.3 %      Neutrophils, Absolute 6.09 10*3/mm3      Lymphocytes, Absolute 1.03 10*3/mm3      Monocytes, Absolute 0.19 10*3/mm3      Eosinophils, Absolute 0.02 10*3/mm3      Basophils, Absolute 0.01 10*3/mm3      Immature Grans, Absolute 0.02 10*3/mm3     Magnesium [[id number]]  (Normal) Collected:  12/31/18 0524    Specimen:  Blood Updated:  12/31/18 0718     Magnesium 1.8 mg/dL     Basic Metabolic Panel [[id number]]  (Abnormal) Collected:  12/31/18 0524    Specimen:  Blood Updated:  12/31/18 0715     Glucose 92 mg/dL      BUN 18 mg/dL      Creatinine 0.41 mg/dL      Sodium 130 mmol/L      Potassium 3.8 mmol/L      Chloride  "89 mmol/L      CO2 29.2 mmol/L      Calcium 9.8 mg/dL      eGFR Non African Amer >150 mL/min/1.73      BUN/Creatinine Ratio 43.9     Anion Gap 11.8 mmol/L     Narrative:       GFR Normal >60  Chronic Kidney Disease <60  Kidney Failure <15    CBC & Differential [[id number]] Collected:  12/31/18 0524    Specimen:  Blood Updated:  12/31/18 0647    Narrative:       The following orders were created for panel order CBC & Differential.  Procedure                               Abnormality         Status                     ---------                               -----------         ------                     CBC Auto Differential[[id number]]        Abnormal            Final result                 Please view results for these tests on the individual orders.    CBC Auto Differential [[id number]]  (Abnormal) Collected:  12/31/18 0524    Specimen:  Blood Updated:  12/31/18 0647     WBC 7.63 10*3/mm3      RBC 3.81 10*6/mm3      Hemoglobin 13.1 g/dL      Hematocrit 36.1 %      MCV 94.8 fL      MCH 34.4 pg      MCHC 36.3 g/dL      RDW 11.9 %      RDW-SD 40.1 fl      MPV 9.8 fL      Platelets 290 10*3/mm3      Neutrophil % 80.7 %      Lymphocyte % 8.1 %      Monocyte % 10.4 %      Eosinophil % 0.5 %      Basophil % 0.3 %      Immature Grans % 0.1 %      Neutrophils, Absolute 6.16 10*3/mm3      Lymphocytes, Absolute 0.62 10*3/mm3      Monocytes, Absolute 0.79 10*3/mm3      Eosinophils, Absolute 0.04 10*3/mm3      Basophils, Absolute 0.02 10*3/mm3      Immature Grans, Absolute 0.01 10*3/mm3         /67 (BP Location: Right arm, Patient Position: Lying)   Pulse 72   Temp 98.5 °F (36.9 °C) (Oral)   Resp 20   Ht 162.6 cm (64\")   Wt 32 kg (70 lb 8.8 oz)   SpO2 98%   BMI 12.11 kg/m²     Discharge Exam:  General Appearance:    Alert, cooperative, no distress                          Head:    Normocephalic, without obvious abnormality, atraumatic                          Eyes:                            Throat:   Lips, tongue, " gums normal                          Neck:   Supple, symmetrical, trachea midline, no JVD                        Lungs:     Clear to auscultation bilaterally, respirations unlabored                Chest Wall:    No tenderness or deformity                        Heart:    Regular rate and rhythm, S1 and S2 normal, no murmur,no  Rub or gallop                  Abdomen:     Soft, non-tender, bowel sounds active, no masses, no organomegaly                  Extremities:   Extremities normal, atraumatic, no cyanosis or edema                             Skin:   Skin is warm and dry,  no rashes or palpable lesions                  Neurologic:   no focal deficits noted     Disposition:  Skilled nursing facility    Patient Instructions:      Discharge Medications      New Medications      Instructions Start Date   furosemide 20 MG tablet  Commonly known as:  LASIX   20 mg, Oral, Daily      nicotine 21 MG/24HR patch  Commonly known as:  NICODERM CQ   1 patch, Transdermal, Every 24 Hours Scheduled      zolpidem 5 MG tablet  Commonly known as:  AMBIEN   5 mg, Oral, Nightly PRN         Continue These Medications      Instructions Start Date   CLARITIN 10 MG tablet  Generic drug:  loratadine   10 mg, Oral, Daily PRN      doxepin 10 MG capsule  Commonly known as:  SINEquan   10 mg, Oral, Nightly      hydrocortisone 1 % cream   1 application, Topical, 3 Times Daily PRN      hydrOXYzine 25 MG tablet  Commonly known as:  ATARAX   25 mg, Oral, 3 Times Daily PRN           Future Appointments   Date Time Provider Department Center   1/14/2019 12:45 PM [facility]   1/14/2019  1:20 PM [facility] Canada [facility]   1/14/2019  1:45 PM CHAIR  MARRY Foley MD [facility]      Contact information for follow-up providers     Bernie Francois MD .    Specialty:  Family Medicine  Contact information:  [address] [facility] [address] [phone]                   Contact  information for after-discharge care     Destination     [facility] .    Service:  Intermediate Care  Contact information:  [address]  [facility] [address] [phone]                           Discharge Order (From admission, onward)    Start     Ordered    01/06/19 1400  Discharge patient  Once     Expected Discharge Date:  01/06/19    Discharge Disposition:  Skilled Nursing Facility (DC - External)    Physician of Record for Attribution - Please select from Treatment Team:  NEFTALI DOWD [[id number]]    Review needed by CMO to determine Physician of Record:  No       Question Answer Comment   Physician of Record for Attribution - Please select from Treatment Team NEFTALI DOWD    Review needed by CMO to determine Physician of Record No        01/06/19 1406          Total time spent discharging patient including evaluation,post hospitalization follow up,  medication and post hospitalization instructions and education total time exceeds 30 minutes.    Signed:  Neftali Dowd MD  1/6/2019  2:04 PM

## 2019-01-07 NOTE — PROGRESS NOTES
Case Management Discharge Note    Final Note: [facility] IC level of care- left vm from Marilee/Signature to cruzito christopher rn/ccp    Destination - Selection Complete      Service Provider Request Status Selected Services Address Phone Number Fax Number    [facility] Selected [facility]mediate Care [phone] [facility] [address] [phone]      Durable Medical Equipment      No service has been selected for the patient.      Dialysis/Infusion      No service has been selected for the patient.      Home Medical Care      No service has been selected for the patient.      Community Resources      No service has been selected for the patient.        Ambulance: Other    Final Discharge Disposition Code: 04 - intermediate care facility

## 2019-01-09 DIAGNOSIS — C34.91 SMALL CELL CARCINOMA OF RIGHT LUNG (HCC): ICD-10-CM

## 2019-01-13 RX ORDER — DOXEPIN HYDROCHLORIDE 10 MG/1
CAPSULE ORAL
Qty: 30 CAPSULE | Refills: 0 | OUTPATIENT
Start: 2019-01-13

## 2019-01-14 ENCOUNTER — APPOINTMENT (OUTPATIENT)
Dept: ONCOLOGY | Facility: CLINIC | Age: 64
End: 2019-01-14

## 2019-01-14 ENCOUNTER — APPOINTMENT (OUTPATIENT)
Dept: ONCOLOGY | Facility: HOSPITAL | Age: 64
End: 2019-01-14

## (undated) DEVICE — ANTIBACTERIAL UNDYED BRAIDED (POLYGLACTIN 910), SYNTHETIC ABSORBABLE SUTURE: Brand: COATED VICRYL

## (undated) DEVICE — DRSNG SURESITE WNDW 4X4.5

## (undated) DEVICE — SOL NS 500ML

## (undated) DEVICE — APPL CHLORAPREP W/TINT 26ML ORNG

## (undated) DEVICE — INTENDED FOR TISSUE SEPARATION, AND OTHER PROCEDURES THAT REQUIRE A SHARP SURGICAL BLADE TO PUNCTURE OR CUT.: Brand: BARD-PARKER ® CARBON RIB-BACK BLADES

## (undated) DEVICE — DRP C/ARM 41X74IN

## (undated) DEVICE — DECANT BG O JET

## (undated) DEVICE — LOU MINOR PROCEDURE: Brand: MEDLINE INDUSTRIES, INC.

## (undated) DEVICE — SYR LL TP 10ML STRL

## (undated) DEVICE — ADHS SKIN DERMABOND TOP ADVANCED

## (undated) DEVICE — GLV SURG BIOGEL LTX PF 8 1/2

## (undated) DEVICE — SUT PROLN 3/0 SH D/A 36IN 8522H

## (undated) DEVICE — NDL HYPO PRECISIONGLIDE REG 25G 1 1/2

## (undated) DEVICE — DRSNG TELFA PAD NONADH STR 1S 3X4IN